# Patient Record
Sex: FEMALE | Race: WHITE | NOT HISPANIC OR LATINO | Employment: FULL TIME | URBAN - METROPOLITAN AREA
[De-identification: names, ages, dates, MRNs, and addresses within clinical notes are randomized per-mention and may not be internally consistent; named-entity substitution may affect disease eponyms.]

---

## 2019-04-08 ENCOUNTER — OUTPATIENT INFUSION SERVICES (OUTPATIENT)
Dept: ONCOLOGY | Facility: MEDICAL CENTER | Age: 34
End: 2019-04-08
Attending: INTERNAL MEDICINE
Payer: COMMERCIAL

## 2019-04-08 VITALS
SYSTOLIC BLOOD PRESSURE: 132 MMHG | HEIGHT: 64 IN | HEART RATE: 66 BPM | WEIGHT: 171.08 LBS | OXYGEN SATURATION: 98 % | TEMPERATURE: 97.1 F | DIASTOLIC BLOOD PRESSURE: 71 MMHG | BODY MASS INDEX: 29.21 KG/M2 | RESPIRATION RATE: 18 BRPM

## 2019-04-08 DIAGNOSIS — K50.10 CROHN'S COLITIS, WITHOUT COMPLICATIONS (HCC): ICD-10-CM

## 2019-04-08 LAB
HBV CORE AB SERPL QL IA: NEGATIVE
HBV SURFACE AB SERPL IA-ACNC: 81.59 MIU/ML (ref 0–10)

## 2019-04-08 PROCEDURE — 86480 TB TEST CELL IMMUN MEASURE: CPT

## 2019-04-08 PROCEDURE — 96415 CHEMO IV INFUSION ADDL HR: CPT

## 2019-04-08 PROCEDURE — 96413 CHEMO IV INFUSION 1 HR: CPT

## 2019-04-08 PROCEDURE — 700111 HCHG RX REV CODE 636 W/ 250 OVERRIDE (IP): Performed by: INTERNAL MEDICINE

## 2019-04-08 PROCEDURE — A9270 NON-COVERED ITEM OR SERVICE: HCPCS | Performed by: INTERNAL MEDICINE

## 2019-04-08 PROCEDURE — 86706 HEP B SURFACE ANTIBODY: CPT

## 2019-04-08 PROCEDURE — 86704 HEP B CORE ANTIBODY TOTAL: CPT

## 2019-04-08 PROCEDURE — 700102 HCHG RX REV CODE 250 W/ 637 OVERRIDE(OP): Performed by: INTERNAL MEDICINE

## 2019-04-08 PROCEDURE — 96375 TX/PRO/DX INJ NEW DRUG ADDON: CPT

## 2019-04-08 PROCEDURE — 700105 HCHG RX REV CODE 258: Performed by: INTERNAL MEDICINE

## 2019-04-08 RX ORDER — M-VIT,TX,IRON,MINS/CALC/FOLIC 27MG-0.4MG
1 TABLET ORAL DAILY
COMMUNITY

## 2019-04-08 RX ORDER — ACETAMINOPHEN 500 MG
1000 TABLET ORAL ONCE
Status: COMPLETED | OUTPATIENT
Start: 2019-04-08 | End: 2019-04-08

## 2019-04-08 RX ORDER — DIPHENHYDRAMINE HCL 25 MG
25 TABLET ORAL ONCE
Status: COMPLETED | OUTPATIENT
Start: 2019-04-08 | End: 2019-04-08

## 2019-04-08 RX ORDER — METHYLPREDNISOLONE SODIUM SUCCINATE 40 MG/ML
40 INJECTION, POWDER, LYOPHILIZED, FOR SOLUTION INTRAMUSCULAR; INTRAVENOUS
Status: COMPLETED | OUTPATIENT
Start: 2019-04-08 | End: 2019-04-08

## 2019-04-08 RX ADMIN — INFLIXIMAB 400 MG: 100 INJECTION, POWDER, LYOPHILIZED, FOR SOLUTION INTRAVENOUS at 17:35

## 2019-04-08 RX ADMIN — DIPHENHYDRAMINE HCL 25 MG: 25 TABLET ORAL at 17:18

## 2019-04-08 RX ADMIN — METHYLPREDNISOLONE SODIUM SUCCINATE 40 MG: 40 INJECTION, POWDER, FOR SOLUTION INTRAMUSCULAR; INTRAVENOUS at 17:23

## 2019-04-08 RX ADMIN — ACETAMINOPHEN 1000 MG: 500 TABLET, FILM COATED ORAL at 17:18

## 2019-04-09 NOTE — PROGRESS NOTES
Pt scheduled for Remicade; transferring care from [facility] to [facility]. Arrived ambulatory, independent; endorsed minor abdominal pain; denied s/sx infection or other health changes. Pt educated on infusion process, procedures; oriented to unit; questions answered until satisfactory. Pt presented treatment summary from [facility], which stated HBV & TB quant labs completed; however, new samples drawn per pharmacy protocol. PIV placed to R-FA; easily flushed, brisk blood return. Premeds given per orders (including solu-medrol per pt request). Infusion completed over 2 hr per pt request w/o adverse events. PIV flushed, brisk blood return; removed, cath intact. Treatment plan reviewed; message sent to schedulers re: appt needs. Pt denied any unmet needs; discharged ambulatory, independent, NAD.

## 2019-04-10 LAB
GAMMA INTERFERON BACKGROUND BLD IA-ACNC: 0.04 IU/ML
M TB IFN-G BLD-IMP: NEGATIVE
M TB IFN-G CD4+ BCKGRND COR BLD-ACNC: 0.06 IU/ML
MITOGEN IGNF BCKGRD COR BLD-ACNC: >10 IU/ML
QFT TB2 - NIL TBQ2: 0.08 IU/ML

## 2019-05-31 ENCOUNTER — HOSPITAL ENCOUNTER (OUTPATIENT)
Dept: LAB | Facility: MEDICAL CENTER | Age: 34
End: 2019-05-31
Attending: INTERNAL MEDICINE
Payer: COMMERCIAL

## 2019-05-31 LAB
ALBUMIN SERPL BCP-MCNC: 4.3 G/DL (ref 3.2–4.9)
ALBUMIN/GLOB SERPL: 1.4 G/DL
ALP SERPL-CCNC: 51 U/L (ref 30–99)
ALT SERPL-CCNC: 23 U/L (ref 2–50)
ANION GAP SERPL CALC-SCNC: 7 MMOL/L (ref 0–11.9)
AST SERPL-CCNC: 24 U/L (ref 12–45)
BASOPHILS # BLD AUTO: 0.9 % (ref 0–1.8)
BASOPHILS # BLD: 0.08 K/UL (ref 0–0.12)
BILIRUB SERPL-MCNC: 0.7 MG/DL (ref 0.1–1.5)
BUN SERPL-MCNC: 15 MG/DL (ref 8–22)
CALCIUM SERPL-MCNC: 9.4 MG/DL (ref 8.5–10.5)
CHLORIDE SERPL-SCNC: 106 MMOL/L (ref 96–112)
CO2 SERPL-SCNC: 25 MMOL/L (ref 20–33)
CREAT SERPL-MCNC: 0.66 MG/DL (ref 0.5–1.4)
EOSINOPHIL # BLD AUTO: 0.73 K/UL (ref 0–0.51)
EOSINOPHIL NFR BLD: 8.5 % (ref 0–6.9)
ERYTHROCYTE [DISTWIDTH] IN BLOOD BY AUTOMATED COUNT: 41.2 FL (ref 35.9–50)
GLOBULIN SER CALC-MCNC: 3 G/DL (ref 1.9–3.5)
GLUCOSE SERPL-MCNC: 81 MG/DL (ref 65–99)
HCT VFR BLD AUTO: 44.8 % (ref 37–47)
HGB BLD-MCNC: 15 G/DL (ref 12–16)
IMM GRANULOCYTES # BLD AUTO: 0.02 K/UL (ref 0–0.11)
IMM GRANULOCYTES NFR BLD AUTO: 0.2 % (ref 0–0.9)
LYMPHOCYTES # BLD AUTO: 2.22 K/UL (ref 1–4.8)
LYMPHOCYTES NFR BLD: 25.7 % (ref 22–41)
MCH RBC QN AUTO: 30.9 PG (ref 27–33)
MCHC RBC AUTO-ENTMCNC: 33.5 G/DL (ref 33.6–35)
MCV RBC AUTO: 92.4 FL (ref 81.4–97.8)
MONOCYTES # BLD AUTO: 0.54 K/UL (ref 0–0.85)
MONOCYTES NFR BLD AUTO: 6.3 % (ref 0–13.4)
NEUTROPHILS # BLD AUTO: 5.04 K/UL (ref 2–7.15)
NEUTROPHILS NFR BLD: 58.4 % (ref 44–72)
NRBC # BLD AUTO: 0 K/UL
NRBC BLD-RTO: 0 /100 WBC
PLATELET # BLD AUTO: 290 K/UL (ref 164–446)
PMV BLD AUTO: 10.2 FL (ref 9–12.9)
POTASSIUM SERPL-SCNC: 4.2 MMOL/L (ref 3.6–5.5)
PROT SERPL-MCNC: 7.3 G/DL (ref 6–8.2)
RBC # BLD AUTO: 4.85 M/UL (ref 4.2–5.4)
SODIUM SERPL-SCNC: 138 MMOL/L (ref 135–145)
WBC # BLD AUTO: 8.6 K/UL (ref 4.8–10.8)

## 2019-05-31 PROCEDURE — 80053 COMPREHEN METABOLIC PANEL: CPT

## 2019-05-31 PROCEDURE — 85025 COMPLETE CBC W/AUTO DIFF WBC: CPT

## 2019-05-31 PROCEDURE — 36415 COLL VENOUS BLD VENIPUNCTURE: CPT

## 2019-06-03 ENCOUNTER — APPOINTMENT (OUTPATIENT)
Dept: RADIOLOGY | Facility: MEDICAL CENTER | Age: 34
End: 2019-06-03
Attending: INTERNAL MEDICINE
Payer: COMMERCIAL

## 2019-06-06 ENCOUNTER — HOSPITAL ENCOUNTER (OUTPATIENT)
Dept: RADIOLOGY | Facility: MEDICAL CENTER | Age: 34
End: 2019-06-06
Attending: INTERNAL MEDICINE
Payer: COMMERCIAL

## 2019-06-06 DIAGNOSIS — K50.019 CROHN'S DISEASE OF SMALL INTESTINE WITH COMPLICATION (HCC): ICD-10-CM

## 2019-06-06 PROCEDURE — 77080 DXA BONE DENSITY AXIAL: CPT

## 2019-06-07 ENCOUNTER — OUTPATIENT INFUSION SERVICES (OUTPATIENT)
Dept: ONCOLOGY | Facility: MEDICAL CENTER | Age: 34
End: 2019-06-07
Attending: INTERNAL MEDICINE
Payer: COMMERCIAL

## 2019-06-07 VITALS
BODY MASS INDEX: 29.45 KG/M2 | HEIGHT: 63 IN | OXYGEN SATURATION: 94 % | HEART RATE: 82 BPM | TEMPERATURE: 98.2 F | RESPIRATION RATE: 18 BRPM | SYSTOLIC BLOOD PRESSURE: 111 MMHG | WEIGHT: 166.23 LBS | DIASTOLIC BLOOD PRESSURE: 73 MMHG

## 2019-06-07 PROCEDURE — A9270 NON-COVERED ITEM OR SERVICE: HCPCS | Performed by: INTERNAL MEDICINE

## 2019-06-07 PROCEDURE — 96413 CHEMO IV INFUSION 1 HR: CPT

## 2019-06-07 PROCEDURE — 700102 HCHG RX REV CODE 250 W/ 637 OVERRIDE(OP): Performed by: INTERNAL MEDICINE

## 2019-06-07 PROCEDURE — 96375 TX/PRO/DX INJ NEW DRUG ADDON: CPT

## 2019-06-07 PROCEDURE — 700111 HCHG RX REV CODE 636 W/ 250 OVERRIDE (IP): Performed by: INTERNAL MEDICINE

## 2019-06-07 PROCEDURE — 96415 CHEMO IV INFUSION ADDL HR: CPT

## 2019-06-07 PROCEDURE — 700105 HCHG RX REV CODE 258: Performed by: INTERNAL MEDICINE

## 2019-06-07 RX ORDER — DIPHENHYDRAMINE HCL 25 MG
25 TABLET ORAL ONCE
Status: COMPLETED | OUTPATIENT
Start: 2019-06-07 | End: 2019-06-07

## 2019-06-07 RX ORDER — INFLIXIMAB 100 MG/10ML
INJECTION, POWDER, LYOPHILIZED, FOR SOLUTION INTRAVENOUS
COMMUNITY
End: 2020-11-06

## 2019-06-07 RX ORDER — ACETAMINOPHEN 500 MG
1000 TABLET ORAL ONCE
Status: COMPLETED | OUTPATIENT
Start: 2019-06-07 | End: 2019-06-07

## 2019-06-07 RX ORDER — METHYLPREDNISOLONE SODIUM SUCCINATE 40 MG/ML
40 INJECTION, POWDER, LYOPHILIZED, FOR SOLUTION INTRAMUSCULAR; INTRAVENOUS
Status: COMPLETED | OUTPATIENT
Start: 2019-06-07 | End: 2019-06-07

## 2019-06-07 RX ADMIN — DIPHENHYDRAMINE HCL 25 MG: 25 TABLET ORAL at 17:08

## 2019-06-07 RX ADMIN — ACETAMINOPHEN 1000 MG: 500 TABLET, FILM COATED ORAL at 17:08

## 2019-06-07 RX ADMIN — METHYLPREDNISOLONE SODIUM SUCCINATE 40 MG: 40 INJECTION, POWDER, FOR SOLUTION INTRAMUSCULAR; INTRAVENOUS at 17:15

## 2019-06-07 RX ADMIN — INFLIXIMAB 400 MG: 100 INJECTION, POWDER, LYOPHILIZED, FOR SOLUTION INTRAVENOUS at 17:20

## 2019-06-08 NOTE — PROGRESS NOTES
Pt arrives to [facility] for Remicade infusion.  Pt denies s/sx of infection.  PIV established to R hand.  Pre-medications given as ordered.  Pt requests Solumedrol IVP due to mild itchiness.  Remicade infused through in line filter.  PIV flushed and removed.  Site wrapped with gauze and coban.  Sent email to scheduling dept to contact patient to schedule next infusion.  Pt dc home to self care.

## 2019-06-17 ENCOUNTER — OFFICE VISIT (OUTPATIENT)
Dept: MEDICAL GROUP | Facility: IMAGING CENTER | Age: 34
End: 2019-06-17
Payer: COMMERCIAL

## 2019-06-17 VITALS
WEIGHT: 165 LBS | SYSTOLIC BLOOD PRESSURE: 110 MMHG | TEMPERATURE: 98.4 F | BODY MASS INDEX: 28.17 KG/M2 | HEIGHT: 64 IN | DIASTOLIC BLOOD PRESSURE: 72 MMHG | RESPIRATION RATE: 12 BRPM | OXYGEN SATURATION: 97 % | HEART RATE: 61 BPM

## 2019-06-17 DIAGNOSIS — K50.919 CROHN'S DISEASE WITH COMPLICATION, UNSPECIFIED GASTROINTESTINAL TRACT LOCATION (HCC): ICD-10-CM

## 2019-06-17 DIAGNOSIS — Z12.83 SKIN CANCER SCREENING: ICD-10-CM

## 2019-06-17 DIAGNOSIS — M25.551 CHRONIC HIP PAIN, BILATERAL: ICD-10-CM

## 2019-06-17 DIAGNOSIS — E55.9 VITAMIN D DEFICIENCY: ICD-10-CM

## 2019-06-17 DIAGNOSIS — G89.29 CHRONIC HIP PAIN, BILATERAL: ICD-10-CM

## 2019-06-17 DIAGNOSIS — Z12.4 CERVICAL CANCER SCREENING: ICD-10-CM

## 2019-06-17 DIAGNOSIS — Z00.00 HEALTHCARE MAINTENANCE: ICD-10-CM

## 2019-06-17 DIAGNOSIS — M25.552 CHRONIC HIP PAIN, BILATERAL: ICD-10-CM

## 2019-06-17 PROBLEM — G43.909 MIGRAINE: Status: ACTIVE | Noted: 2019-06-17

## 2019-06-17 PROBLEM — K50.90 CROHN'S DISEASE (HCC): Status: ACTIVE | Noted: 2019-06-17

## 2019-06-17 PROCEDURE — 99205 OFFICE O/P NEW HI 60 MIN: CPT | Performed by: INTERNAL MEDICINE

## 2019-06-17 ASSESSMENT — PAIN SCALES - GENERAL: PAINLEVEL: NO PAIN

## 2019-06-17 NOTE — LETTER
[employer]  Price Blair D.O.  [address] [place] [address]  Fax: [phone]   Authorization for Release/Disclosure of   Protected Health Information   Name: NEELA LYNCH : 1985 SSN: [id number]   Address: [facility] [address] [place] [address] Phone:    [phone] (home)    I authorize the entity listed below to release/disclose the PHI below to:   [employer]/Price Blair D.O. and Price Blair D.O.   Provider or Entity Name:     Address   City, State, Zip   Phone:      Fax:     Reason for request: continuity of care   Information to be released:    [  ] LAST COLONOSCOPY,  including any PATH REPORT and follow-up  [  ] LAST FIT/COLOGUARD RESULT [  ] LAST DEXA  [  ] LAST MAMMOGRAM  [  ] LAST PAP  [  ] LAST LABS [  ] RETINA EXAM REPORT  [  ] IMMUNIZATION RECORDS  [  ] Release all info      [  ] Check here and initial the line next to each item to release ALL health information INCLUDING  _____ Care and treatment for drug and / or alcohol abuse  _____ HIV testing, infection status, or AIDS  _____ Genetic Testing    DATES OF SERVICE OR TIME PERIOD TO BE DISCLOSED: _____________  I understand and acknowledge that:  * This Authorization may be revoked at any time by you in writing, except if your health information has already been used or disclosed.  * Your health information that will be used or disclosed as a result of you signing this authorization could be re-disclosed by the recipient. If this occurs, your re-disclosed health information may no longer be protected by State or Federal laws.  * You may refuse to sign this Authorization. Your refusal will not affect your ability to obtain treatment.  * This Authorization becomes effective upon signing and will  on (date) __________.      If no date is indicated, this Authorization will  one (1) year from the signature date.    Name: Neela Lynch    Signature:   Date:     2019       PLEASE FAX REQUESTED  RECORDS BACK TO: [phone]

## 2019-06-17 NOTE — PROGRESS NOTES
Chief Complaint   Patient presents with   • Crohn's Disease     since 2011     Magaly Lynch is a 33 y.o. female who presents today to establish care at [facility] and to discuss joint pain and Crohn's disease    HPI:  Patient describes joint pain associated with Crohn's disease. Joint pain is worse at hips. Some sciatica described as pinching sensation.  Also hand joints - problematic when needs to work on a computer. Thumb joints. Provocation/palliation: Worse during colder months. Unable to sleep well. Better now that it is warmer.   Onset: Started about 3 yrs ago.   Severity: Pain all the time.  Stops her doing what she does 6/10. Standing desk on occasion helps.  Treatments: Restorative yoga class can sometimes improve pain. Questioning about pain management for joint pain - she prefers not to take opioids. IV opiates work ok.  Don't like the way they make her feel. Vasovagal reaction with IV.   Works for [employer]. Had acupuncture there x 1 - skeptical. Also saw chiropractor (sciatic pain) around the same time. THC and CBD salve helps. 600 ibupfrofen pr (uses once a week).      Migraines ocular. Since 13.   Triggers: Stress and certain lighting, post stress. Soy or fake meat products.   Treatment: Also had ovarian cyst and had been on OCP - stayed on for migraines. Off OCP bc of side effects.     Allergist - envt allergies. Skin allergies - rashes. Pollen. Rhinorhea. Contact derm with some grasses. Poison oak.   Treatment: Avoidance.     Crohn's disease. Chronic condition - Remicade since 2011. Term illeum mainly involved. Symptoms are overall managed on this medication. Sees Dr. Euceda.  2018 Colonoscopy normal.  Was previously on MTX (had many infections - had MRSA sinus)  Flu shot annually  DEXA normal 6/2019  Would like gynecology and gynecology recommendation    Diet - Tried anti-inflammatory diet, AIP, whole 30, vegetarian (tried 18 yrs).  Can't eat certain foods (avoids bell pepper). Feels her diet is  now balanced - less red meat and more lean meats. Lost 8lbs since moving to [employer].  Exercise: yoga, boxing, spinning, hiking.    Current medicines (including changes today)  Current Outpatient Prescriptions   Medication Sig Dispense Refill   • inFLIXimab (REMICADE) 100 MG Recon Soln by Intravenous route.     • therapeutic multivitamin-minerals (THERAGRAN-M) Tab Take 1 Tab by mouth every day.     • Probiotic Product (PROBIOTIC-10 PO) Take  by mouth.       No current facility-administered medications for this visit.      She  has a past medical history of Crohn's disease (HCC) (20111); Hip pain, bilateral; Kidney stone (2008 to 2015); Migraines (1998); and Ovarian cyst.  She  has a past surgical history that includes dental extraction(s).  Social History   Substance Use Topics   • Smoking status: Never Smoker   • Smokeless tobacco: Never Used   • Alcohol use Yes      Comment: once a week     Family History   Problem Relation Age of Onset   • Breast Cancer Mother 57        no genetic markers   • Other Mother         MS, fibroids/hysterectomy   • Cancer Maternal Grandmother         uterine or fibrioids?, breast, skin, lung   • Lung Disease Maternal Grandmother         emphysema   • Psychiatry Maternal Grandmother         suicide   • GI Maternal Grandfather         Crohn's   • GI Maternal Aunt         Crohn's     No family status information on file.     Social History     Social History Narrative   • No narrative on file     ROS  Constitutional: Negative for fever, chills, weight loss and some malaise/fatigue.   HENT: Negative for ear pain, nosebleeds, congestion, sore throat and neck pain.    Eyes: Negative for blurred vision.   Respiratory: Negative for cough, sputum production, shortness of breath and wheezing.    Cardiovascular: Negative for chest pain, palpitations, orthopnea and leg swelling.   Gastrointestinal: Negative for heartburn, nausea, vomiting and abdominal pain.   Genitourinary: Negative for dysuria,  "urgency and frequency.   Musculoskeletal: Negative for myalgias, positive for joint pain as per hpi.  Skin: Negative for rash and itching.   Neurological: Negative for dizziness, tingling, tremors, sensory change, focal weakness and headaches.   Endo/Heme/Allergies: Does not bruise/bleed easily.   Psychiatric/Behavioral: Negative for memory loss.  Some anxiety/depressed symptoms. Lack of sleep - has 3 yr old.  All other systems reviewed and are negative except as in HPI.    Labs reviewed with patient:  Lab Results   Component Value Date/Time    WBC 8.6 05/31/2019 11:10 AM    RBC 4.85 05/31/2019 11:10 AM    HEMOGLOBIN 15.0 05/31/2019 11:10 AM    HEMATOCRIT 44.8 05/31/2019 11:10 AM    MCV 92.4 05/31/2019 11:10 AM    MCH 30.9 05/31/2019 11:10 AM    MCHC 33.5 (L) 05/31/2019 11:10 AM    MPV 10.2 05/31/2019 11:10 AM    NEUTSPOLYS 58.40 05/31/2019 11:10 AM    LYMPHOCYTES 25.70 05/31/2019 11:10 AM    MONOCYTES 6.30 05/31/2019 11:10 AM    EOSINOPHILS 8.50 (H) 05/31/2019 11:10 AM    BASOPHILS 0.90 05/31/2019 11:10 AM      Lab Results   Component Value Date/Time    SODIUM 138 05/31/2019 11:10 AM    POTASSIUM 4.2 05/31/2019 11:10 AM    CHLORIDE 106 05/31/2019 11:10 AM    CO2 25 05/31/2019 11:10 AM    GLUCOSE 81 05/31/2019 11:10 AM    BUN 15 05/31/2019 11:10 AM    CREATININE 0.66 05/31/2019 11:10 AM    ALKPHOSPHAT 51 05/31/2019 11:10 AM    ASTSGOT 24 05/31/2019 11:10 AM    ALTSGPT 23 05/31/2019 11:10 AM    TBILIRUBIN 0.7 05/31/2019 11:10 AM        Objective:   /72 (BP Location: Left arm, Patient Position: Sitting, BP Cuff Size: Adult)   Pulse 61   Temp 36.9 °C (98.4 °F) (Temporal)   Resp 12   Ht 1.626 m (5' 4\")   Wt 74.8 kg (165 lb)   SpO2 97%  Body mass index is 28.32 kg/m².  Physical Exam:  Constitutional: Alert, no distress.  Skin: Warm, dry, good turgor, no rashes in visible areas.  Eye: Equal, round and reactive, conjunctiva clear, lids normal.  ENMT: Lips without lesions, good dentition, oropharynx " clear.  Neck: Trachea midline, no masses, no thyromegaly.  Respiratory: Unlabored respiratory effort, lungs clear to auscultation, no wheezes, no ronchi.  Cardiovascular: Regular rate and rhythm, no edema.  Abdomen: Soft, non-tender, no masses, no hepatosplenomegaly.  Psych: Alert and oriented x3, normal affect and mood.    Assessment and Plan:   The following treatment plan was discussed  1. Crohn's disease with complication, unspecified gastrointestinal tract location (HCC)     2. Chronic hip pain, bilateral  DX-LUMBAR SPINE-2 OR 3 VIEWS    DX-HIP-BILATERAL-WITH PELVIS-5+   3. Vitamin D deficiency  VITAMIN D,25 HYDROXY   4. Healthcare maintenance  Lipid Profile   5. Cervical cancer screening  REFERRAL TO GYNECOLOGY   6. Skin cancer screening  REFERRAL TO DERMATOLOGY     1. Stable, mostly controlled per patient. Follow-up with ASHLEY Low as scheduled. Records requested. Continue probiotics.   2. Check initial plain films. Trial of acupuncture discussed.   3. Recheck vitamin D3. Has been low in the past.  4. Will need annual TB testing as she is on anti-TNF treatment. Colorectal cancer screening surveillance as set by GI (due in 2023). Immunizations recommended: annual flu, pneumococcal (repeat PPSV23 5 yrs after first PPSV23 dose - may be due for this), HBV (immune 4/2019), neg TB 4/2019.   5. Prior hx of high risk HPV in 6/2015. ASCUS on 4/2018 PAP.  6.  Has increased risk of melanoma.  Records requested.  Followup: Return in about 3 months (around 9/17/2019) for follow-up with PCP, Lab review.    Spent 60 minutes in face-to-tace patient contact in which greater than 50% of the visit was spent in counseling and coordination of care including discussing the benefits and utility of acupuncture.  I answered patient questions about efficacy, safety, and what to expect during a treatment, and the likely number of treatments needed. We also reviewed PMH, family history, and each of her chronic diagnoses in regards to  current management, specialty physicians, symptom control, and future planning.  Please refer to assessment and plan/discussion/recommendations for additional details.        I have worked with consultants from the vendor as well as technical experts from [employer] to optimize the interface. I have made every reasonable attempt to correct obvious errors, but I expect that there are errors of grammar and possibly content that I did not discover before finalizing the note.

## 2019-06-20 ENCOUNTER — APPOINTMENT (OUTPATIENT)
Dept: MEDICAL GROUP | Facility: IMAGING CENTER | Age: 34
End: 2019-06-20

## 2019-06-26 ENCOUNTER — APPOINTMENT (OUTPATIENT)
Dept: MEDICAL GROUP | Facility: IMAGING CENTER | Age: 34
End: 2019-06-26

## 2019-06-28 ENCOUNTER — APPOINTMENT (OUTPATIENT)
Dept: MEDICAL GROUP | Facility: IMAGING CENTER | Age: 34
End: 2019-06-28

## 2019-07-01 ENCOUNTER — TELEPHONE (OUTPATIENT)
Dept: MEDICAL GROUP | Facility: IMAGING CENTER | Age: 34
End: 2019-07-01

## 2019-07-01 NOTE — TELEPHONE ENCOUNTER
----- Message from Magaly Lynch sent at 7/1/2019  5:00 AM PDT -----  Regarding: orders from Dr. Johnnie Mckenzie,     Dr. Blair wanted me to notify you that she added a couple of lab tests for you to have done. A lipid panel and Vitamin D. You will need to fast for these labs. They are in the [facility] system, so if you go to a [facility] lab, you do not need to bring any orders with you. Here is a link to lab locations:    [email or web address]    She also ordered a couple of xrays for you (back and hip). Here is a link to the [facility] imaging locations with the phone number for you to schedule your tests:    [email or web address]    Please let us know if you have any questions!  Jacqueline

## 2019-07-03 ENCOUNTER — APPOINTMENT (OUTPATIENT)
Dept: MEDICAL GROUP | Facility: IMAGING CENTER | Age: 34
End: 2019-07-03

## 2019-07-05 ENCOUNTER — APPOINTMENT (OUTPATIENT)
Dept: MEDICAL GROUP | Facility: IMAGING CENTER | Age: 34
End: 2019-07-05

## 2019-07-12 ENCOUNTER — APPOINTMENT (OUTPATIENT)
Dept: MEDICAL GROUP | Facility: IMAGING CENTER | Age: 34
End: 2019-07-12

## 2019-07-15 ENCOUNTER — APPOINTMENT (OUTPATIENT)
Dept: MEDICAL GROUP | Facility: IMAGING CENTER | Age: 34
End: 2019-07-15

## 2019-07-31 ENCOUNTER — OUTPATIENT INFUSION SERVICES (OUTPATIENT)
Dept: ONCOLOGY | Facility: MEDICAL CENTER | Age: 34
End: 2019-07-31
Attending: INTERNAL MEDICINE
Payer: COMMERCIAL

## 2019-07-31 VITALS
BODY MASS INDEX: 28.45 KG/M2 | DIASTOLIC BLOOD PRESSURE: 77 MMHG | WEIGHT: 166.67 LBS | HEART RATE: 72 BPM | HEIGHT: 64 IN | RESPIRATION RATE: 18 BRPM | OXYGEN SATURATION: 95 % | TEMPERATURE: 98 F | SYSTOLIC BLOOD PRESSURE: 127 MMHG

## 2019-07-31 PROCEDURE — 700105 HCHG RX REV CODE 258: Performed by: INTERNAL MEDICINE

## 2019-07-31 PROCEDURE — 96375 TX/PRO/DX INJ NEW DRUG ADDON: CPT

## 2019-07-31 PROCEDURE — 700111 HCHG RX REV CODE 636 W/ 250 OVERRIDE (IP): Performed by: INTERNAL MEDICINE

## 2019-07-31 PROCEDURE — A9270 NON-COVERED ITEM OR SERVICE: HCPCS | Performed by: INTERNAL MEDICINE

## 2019-07-31 PROCEDURE — 96413 CHEMO IV INFUSION 1 HR: CPT

## 2019-07-31 PROCEDURE — 96415 CHEMO IV INFUSION ADDL HR: CPT

## 2019-07-31 PROCEDURE — 700102 HCHG RX REV CODE 250 W/ 637 OVERRIDE(OP): Performed by: INTERNAL MEDICINE

## 2019-07-31 RX ORDER — DIPHENHYDRAMINE HCL 25 MG
25 TABLET ORAL ONCE
Status: COMPLETED | OUTPATIENT
Start: 2019-07-31 | End: 2019-07-31

## 2019-07-31 RX ORDER — ACETAMINOPHEN 500 MG
1000 TABLET ORAL ONCE
Status: COMPLETED | OUTPATIENT
Start: 2019-07-31 | End: 2019-07-31

## 2019-07-31 RX ORDER — METHYLPREDNISOLONE SODIUM SUCCINATE 40 MG/ML
40 INJECTION, POWDER, LYOPHILIZED, FOR SOLUTION INTRAMUSCULAR; INTRAVENOUS
Status: DISCONTINUED | OUTPATIENT
Start: 2019-07-31 | End: 2019-07-31 | Stop reason: HOSPADM

## 2019-07-31 RX ADMIN — METHYLPREDNISOLONE SODIUM SUCCINATE 40 MG: 40 INJECTION, POWDER, FOR SOLUTION INTRAMUSCULAR; INTRAVENOUS at 15:23

## 2019-07-31 RX ADMIN — INFLIXIMAB 400 MG: 100 INJECTION, POWDER, LYOPHILIZED, FOR SOLUTION INTRAVENOUS at 15:47

## 2019-07-31 RX ADMIN — ACETAMINOPHEN 1000 MG: 500 TABLET, FILM COATED ORAL at 15:23

## 2019-07-31 RX ADMIN — DIPHENHYDRAMINE HCL 25 MG: 25 TABLET ORAL at 15:23

## 2019-07-31 NOTE — PROGRESS NOTES
Patient presents for Remicade infusion. Patient denies any active infections at this time. PIV established, flushes well with brisk blood return. Pre-medications administered as ordered. Remicade infusing, in-line filter in place. Patient in stable condition, report to Rachelle ENGLAND.

## 2019-08-01 NOTE — PROGRESS NOTES
Report received from TOMÁS Walker.  Remicade infusion completed without incident.  Line flushed clear with normal saline.  PIV flushed with normal saline; continued + blood return verified.  PIV d/c'd.  Pressure dressing applied. Pt released to self care in no apparent distress after completion of treatment, ambulatory.  Pt to return in 8 weeks; next appointment scheduled.

## 2019-09-27 ENCOUNTER — OUTPATIENT INFUSION SERVICES (OUTPATIENT)
Dept: ONCOLOGY | Facility: MEDICAL CENTER | Age: 34
End: 2019-09-27
Attending: INTERNAL MEDICINE
Payer: COMMERCIAL

## 2019-09-27 VITALS
DIASTOLIC BLOOD PRESSURE: 69 MMHG | HEART RATE: 72 BPM | HEIGHT: 64 IN | TEMPERATURE: 98.5 F | SYSTOLIC BLOOD PRESSURE: 120 MMHG | WEIGHT: 172.62 LBS | OXYGEN SATURATION: 95 % | RESPIRATION RATE: 18 BRPM | BODY MASS INDEX: 29.47 KG/M2

## 2019-09-27 PROCEDURE — 96375 TX/PRO/DX INJ NEW DRUG ADDON: CPT

## 2019-09-27 PROCEDURE — 700102 HCHG RX REV CODE 250 W/ 637 OVERRIDE(OP): Performed by: INTERNAL MEDICINE

## 2019-09-27 PROCEDURE — 96413 CHEMO IV INFUSION 1 HR: CPT

## 2019-09-27 PROCEDURE — 700105 HCHG RX REV CODE 258: Performed by: INTERNAL MEDICINE

## 2019-09-27 PROCEDURE — A9270 NON-COVERED ITEM OR SERVICE: HCPCS | Performed by: INTERNAL MEDICINE

## 2019-09-27 PROCEDURE — 700111 HCHG RX REV CODE 636 W/ 250 OVERRIDE (IP): Performed by: INTERNAL MEDICINE

## 2019-09-27 PROCEDURE — 96415 CHEMO IV INFUSION ADDL HR: CPT

## 2019-09-27 RX ORDER — ACETAMINOPHEN 500 MG
1000 TABLET ORAL ONCE
Status: COMPLETED | OUTPATIENT
Start: 2019-09-27 | End: 2019-09-27

## 2019-09-27 RX ORDER — DIPHENHYDRAMINE HCL 25 MG
25 TABLET ORAL ONCE
Status: COMPLETED | OUTPATIENT
Start: 2019-09-27 | End: 2019-09-27

## 2019-09-27 RX ORDER — METHYLPREDNISOLONE SODIUM SUCCINATE 40 MG/ML
40 INJECTION, POWDER, LYOPHILIZED, FOR SOLUTION INTRAMUSCULAR; INTRAVENOUS
Status: DISCONTINUED | OUTPATIENT
Start: 2019-09-27 | End: 2019-09-27 | Stop reason: HOSPADM

## 2019-09-27 RX ADMIN — METHYLPREDNISOLONE SODIUM SUCCINATE 40 MG: 40 INJECTION, POWDER, FOR SOLUTION INTRAMUSCULAR; INTRAVENOUS at 16:08

## 2019-09-27 RX ADMIN — INFLIXIMAB 400 MG: 100 INJECTION, POWDER, LYOPHILIZED, FOR SOLUTION INTRAVENOUS at 16:16

## 2019-09-27 RX ADMIN — ACETAMINOPHEN 1000 MG: 500 TABLET, FILM COATED ORAL at 16:00

## 2019-09-27 RX ADMIN — DIPHENHYDRAMINE HCL 25 MG: 25 TABLET ORAL at 16:01

## 2019-09-28 NOTE — PROGRESS NOTES
Report received from TOMÁS Degroot. Remicade infusing at time of report.  Pt denied complaints, call bell within reach. Pt tolerated remainder of infusion. PIV flushed with NS, then d/c'd. Catheter tip remained intact. Gauze and coban to site.  Pt stated she will check MyChart to see her next scheduled appointment time. Left OPIC in NAD.

## 2019-09-28 NOTE — PROGRESS NOTES
Pt ambulatory to [facility] for Remicade infusion.  Pt denies any sx of infection. Pt w/ no complaints at this time.  PIV established in right hand, brisk blood return noted, flushed per protocol.  Pre-meds administered w/ no adverse reactions.  Remicade infused through PIV w/ filter in place over 2 hours.  1700  Report given to Moriah ENGLAND who will finish caring for the pt.

## 2019-09-30 ENCOUNTER — OFFICE VISIT (OUTPATIENT)
Dept: MEDICAL GROUP | Facility: IMAGING CENTER | Age: 34
End: 2019-09-30
Payer: COMMERCIAL

## 2019-09-30 VITALS
RESPIRATION RATE: 12 BRPM | HEART RATE: 65 BPM | SYSTOLIC BLOOD PRESSURE: 110 MMHG | HEIGHT: 63 IN | DIASTOLIC BLOOD PRESSURE: 84 MMHG | TEMPERATURE: 98.3 F | BODY MASS INDEX: 30.76 KG/M2 | WEIGHT: 173.6 LBS | OXYGEN SATURATION: 99 %

## 2019-09-30 DIAGNOSIS — K50.919 CROHN'S DISEASE WITH COMPLICATION, UNSPECIFIED GASTROINTESTINAL TRACT LOCATION (HCC): ICD-10-CM

## 2019-09-30 DIAGNOSIS — G43.109 MIGRAINE WITH AURA AND WITHOUT STATUS MIGRAINOSUS, NOT INTRACTABLE: ICD-10-CM

## 2019-09-30 DIAGNOSIS — Z23 NEED FOR INFLUENZA VACCINATION: ICD-10-CM

## 2019-09-30 DIAGNOSIS — Z12.83 SKIN CANCER SCREENING: ICD-10-CM

## 2019-09-30 PROCEDURE — 90686 IIV4 VACC NO PRSV 0.5 ML IM: CPT | Performed by: FAMILY MEDICINE

## 2019-09-30 PROCEDURE — 90471 IMMUNIZATION ADMIN: CPT | Performed by: FAMILY MEDICINE

## 2019-09-30 PROCEDURE — 99213 OFFICE O/P EST LOW 20 MIN: CPT | Mod: 25 | Performed by: FAMILY MEDICINE

## 2019-09-30 RX ORDER — CHLORAL HYDRATE 500 MG
1000 CAPSULE ORAL DAILY
COMMUNITY

## 2019-09-30 ASSESSMENT — PAIN SCALES - GENERAL: PAINLEVEL: NO PAIN

## 2019-09-30 ASSESSMENT — PATIENT HEALTH QUESTIONNAIRE - PHQ9: CLINICAL INTERPRETATION OF PHQ2 SCORE: 0

## 2019-09-30 NOTE — PROGRESS NOTES
Subjective:     CC:    Chief Complaint   Patient presents with   • Establish Care       HISTORY OF THE PRESENT ILLNESS: This pleasant patient is a 33 y.o. female here to establish care. Her prior PCP was Dr Blair.  Had some pain in the hips there with some sciatica pains worse when sitting. Worse in winter. Started three years ago. Occasional nausea. abd pain once per week. Did not get the xrays or labs. The hip pain is currently not bothering her.   Here to get reestablished. Wants flu vaccine. remicade infusion three days ago.  11 years with chron's. It is well controlled. About once per week she gets some abd pain that resolves on its own. She is always tender in the right lower quadrant she was told this is due to scarring. It has been stable for 5 years.   She gets migraines less than once per month. Though she does get aura with them.   She was on bcp from teen years due to ovarian cyst though has been off for some time now with out pelvic pain. There is concern for a fibroid she has a TVUS scheduled and will f/u with gyn on this.   She works for [employer]. She is ashraf. And not worried about pregnancy.     Allergies: Hydrocortisone sod succinate; Sumatriptan; Infliximab; Cedarwood oil; Grass extracts [gramineae pollens]; Lidocaine; Poison oak extract [extract of poison oak]; Pollen extract; Prochlorperazine; Promethazine; Zolmitriptan; and Azathioprine    Current Outpatient Medications Ordered in Epic   Medication Sig Dispense Refill   • Omega-3 Fatty Acids (FISH OIL) 1000 MG Cap capsule Take 1,000 mg by mouth every day.     • inFLIXimab (REMICADE) 100 MG Recon Soln by Intravenous route.     • therapeutic multivitamin-minerals (THERAGRAN-M) Tab Take 1 Tab by mouth every day.     • Probiotic Product (PROBIOTIC-10 PO) Take  by mouth.       No current Epic-ordered facility-administered medications on file.        Past Medical History:   Diagnosis Date   • Crohn's disease (HCC) 20111   • Hip pain, bilateral    •  "Kidney stone 2008 to 2015    4x (mostly on right side).    • Migraines 1998   • MRSA infection     sinus infection   • Ovarian cyst     age 18, burst       Past Surgical History:   Procedure Laterality Date   • DENTAL EXTRACTION(S)         Social History     Tobacco Use   • Smoking status: Never Smoker   • Smokeless tobacco: Never Used   Substance Use Topics   • Alcohol use: Yes     Comment: once a week   • Drug use: Yes     Types: Marijuana     Comment: past hallucinogens, smoking (vape)       Social History     Social History Narrative   • Not on file       Family History   Problem Relation Age of Onset   • Breast Cancer Mother 57        no genetic markers   • Other Mother         MS, fibroids/hysterectomy   • Cancer Maternal Grandmother         uterine or fibrioids?, breast, skin, lung   • Lung Disease Maternal Grandmother         emphysema   • Psychiatric Illness Maternal Grandmother         suicide   • GI Disease Maternal Grandfather         Crohn's   • GI Disease Maternal Aunt         Crohn's       ROS:   Gen: no fevers/chills, no changes in weight  Eyes: no changes in vision  ENT: no sore throat, no hearing loss, no bloody nose  Pulm: no sob, no cough  CV: no chest pain, no palpitations  GI: no nausea/vomiting, no diarrhea, she does get constipated   : no dysuria  MSk: no myalgias  Skin: no rash  Neuro: no headaches, no numbness/tingling  Heme/Lymph: no easy bruising      Objective:     Exam: /84 (BP Location: Left arm, Patient Position: Sitting, BP Cuff Size: Adult)   Pulse 65   Temp 36.8 °C (98.3 °F) (Temporal)   Resp 12   Ht 1.6 m (5' 3\")   Wt 78.7 kg (173 lb 9.6 oz)   SpO2 99%  Body mass index is 30.75 kg/m².    General: Normal appearing. No distress.  HEENT: Normocephalic. Eyes conjunctiva clear lids without ptosis, pupils equal and reactive to light    Neck: Supple without JVD or bruit. Thyroid is not enlarged.  Pulmonary: Clear to ausculation.  Normal effort. No rales, ronchi, or " wheezing.  Cardiovascular: Regular rate and rhythm without murmur. Carotid and radial pulses are intact and equal bilaterally.  Abdomen: bs present and normal. No distension of the abd. Slight tenderness rlq does not appear to be associated with the ovaries or uterus though there is some referred pain to the right middle quadrant.   Neurologic: Grossly nonfocal  Lymph: No cervical or supraclavicular lymph nodes are palpable  Skin: Warm and dry.  No obvious lesions.  Musculoskeletal: Normal gait. No extremity cyanosis, clubbing, or edema.  Psych: Normal mood and affect. Alert and oriented x3. Judgment and insight is normal.      Assessment & Plan:   33 y.o. female with the following -    There are no diagnoses linked to this encounter.  She did get blood work and xray of the hips  chrons disease well controlled at this time  Problem List Items Addressed This Visit     Crohn's disease (HCC)    Migraine      Other Visit Diagnoses     Skin cancer screening        Relevant Orders    REFERRAL TO DERMATOLOGY    Need for influenza vaccination        Relevant Orders    Influenza Vaccine Quad Injection (PF) (Completed)        Problem   Crohn's Disease (Hcc)    Overview:   - dx'd in Jan 2011, mainly involving terminal ileum  - initially treated w/ entocort and azathioprine  - on remicade since 4/2011  - HAV / HBV immune  - negatve Tb quant  - currently on remicade [id number] and MTX  - followed by Dr. Cross  - colo - 1/2011 - erythema, edema and cobblestoning in distal ileum and prominent lymphoid aggregates on biopsy    Med Hx: Entocort, aza (itching/sleepiness), Remicade 4/11, MTX 7.5    Last Assessment & Plan:   Doing really well on remicade with increased risk of skin cancer. Sent to derm  Discussed increased risk of pe when hospitalized and with birth control   Vit D insufficiency - Vit D3 plus calcium daily  Healthcare Maintenance:  dexa normal  Flu and pneumovax vaccines complete  Annual Pap smear. Just had pap smear  aug2019  Follow-up in office again in 6 months.     Migraine    Overview:   Hx side effects with sumatriptan and zolmitriptan    Last Assessment & Plan:   - new to me, stable, pt only w/ occ migraines  - pt not interested in prophylactic medication at this time   -mentioned aimovig will need to confirm ok with remicade if she wants to try   - avoid estrogen containing birth control  -she takes motrin though it does not work for her  -occurs less than monthly           Please note that this dictation was created using voice recognition software. I have made every reasonable attempt to correct obvious errors, but I expect that there are errors of grammar and possibly content that I did not discover before finalizing the note.

## 2019-11-22 ENCOUNTER — OUTPATIENT INFUSION SERVICES (OUTPATIENT)
Dept: ONCOLOGY | Facility: MEDICAL CENTER | Age: 34
End: 2019-11-22
Attending: INTERNAL MEDICINE
Payer: COMMERCIAL

## 2019-11-22 VITALS
SYSTOLIC BLOOD PRESSURE: 105 MMHG | WEIGHT: 175.71 LBS | BODY MASS INDEX: 30 KG/M2 | TEMPERATURE: 98.5 F | HEIGHT: 64 IN | HEART RATE: 60 BPM | OXYGEN SATURATION: 99 % | RESPIRATION RATE: 18 BRPM | DIASTOLIC BLOOD PRESSURE: 66 MMHG

## 2019-11-22 PROCEDURE — 700105 HCHG RX REV CODE 258: Performed by: INTERNAL MEDICINE

## 2019-11-22 PROCEDURE — 700111 HCHG RX REV CODE 636 W/ 250 OVERRIDE (IP): Performed by: INTERNAL MEDICINE

## 2019-11-22 PROCEDURE — 96375 TX/PRO/DX INJ NEW DRUG ADDON: CPT

## 2019-11-22 PROCEDURE — 96415 CHEMO IV INFUSION ADDL HR: CPT

## 2019-11-22 PROCEDURE — 96413 CHEMO IV INFUSION 1 HR: CPT

## 2019-11-22 PROCEDURE — 700102 HCHG RX REV CODE 250 W/ 637 OVERRIDE(OP): Performed by: INTERNAL MEDICINE

## 2019-11-22 PROCEDURE — A9270 NON-COVERED ITEM OR SERVICE: HCPCS | Performed by: INTERNAL MEDICINE

## 2019-11-22 RX ORDER — METHYLPREDNISOLONE SODIUM SUCCINATE 40 MG/ML
40 INJECTION, POWDER, LYOPHILIZED, FOR SOLUTION INTRAMUSCULAR; INTRAVENOUS
Status: ACTIVE | OUTPATIENT
Start: 2019-11-22 | End: 2019-09-27

## 2019-11-22 RX ORDER — DIPHENHYDRAMINE HCL 25 MG
25 TABLET ORAL ONCE
Status: COMPLETED | OUTPATIENT
Start: 2019-11-22 | End: 2019-11-22

## 2019-11-22 RX ORDER — ACETAMINOPHEN 500 MG
1000 TABLET ORAL ONCE
Status: COMPLETED | OUTPATIENT
Start: 2019-11-22 | End: 2019-11-22

## 2019-11-22 RX ORDER — METHYLPREDNISOLONE SODIUM SUCCINATE 40 MG/ML
40 INJECTION, POWDER, LYOPHILIZED, FOR SOLUTION INTRAMUSCULAR; INTRAVENOUS
Status: DISCONTINUED | OUTPATIENT
Start: 2019-11-22 | End: 2019-11-22 | Stop reason: HOSPADM

## 2019-11-22 RX ADMIN — DIPHENHYDRAMINE HCL 25 MG: 25 TABLET ORAL at 16:06

## 2019-11-22 RX ADMIN — INFLIXIMAB 400 MG: 100 INJECTION, POWDER, LYOPHILIZED, FOR SOLUTION INTRAVENOUS at 16:15

## 2019-11-22 RX ADMIN — ACETAMINOPHEN 1000 MG: 500 TABLET, FILM COATED ORAL at 16:06

## 2019-11-22 RX ADMIN — METHYLPREDNISOLONE SODIUM SUCCINATE 40 MG: 40 INJECTION, POWDER, FOR SOLUTION INTRAMUSCULAR; INTRAVENOUS at 16:07

## 2019-11-23 NOTE — PROGRESS NOTES
Patient care assumed, verbal report from Moriah ENGLAND. Remicade infusing with no reactions. Continue to monitor and support.

## 2019-11-23 NOTE — PROGRESS NOTES
Tolerated infusion with no reactions. PIV flushed and removed with tip intact. Site covered with gauze and coban. Discharged home to self care in no distress at this time. Next appointment in place.

## 2019-11-23 NOTE — PROGRESS NOTES
Pt arrived ambulatory to [facility] for Remicade infusion.  Pt noted to have Remicade on allergy list. Pt stated that if she doesn't have an allergy to Infliximab, she just had a reaction to it once.  She requested hydrocortisone as a pre-med for Remicade. PIV placed, flushed easily, tiffany briskly.  Pt received pre-meds as ordered.  Remicade set to infuse over 2 hours per patient tolerance. Report given to Teresa BHATIA RN who is assuming care of patient for remainder of visit.

## 2020-01-14 ENCOUNTER — HOSPITAL ENCOUNTER (OUTPATIENT)
Facility: MEDICAL CENTER | Age: 35
End: 2020-01-14
Attending: FAMILY MEDICINE
Payer: COMMERCIAL

## 2020-01-14 ENCOUNTER — OFFICE VISIT (OUTPATIENT)
Dept: MEDICAL GROUP | Facility: IMAGING CENTER | Age: 35
End: 2020-01-14
Payer: COMMERCIAL

## 2020-01-14 VITALS
RESPIRATION RATE: 12 BRPM | OXYGEN SATURATION: 98 % | HEART RATE: 78 BPM | BODY MASS INDEX: 30.83 KG/M2 | WEIGHT: 174 LBS | TEMPERATURE: 98.1 F | SYSTOLIC BLOOD PRESSURE: 116 MMHG | DIASTOLIC BLOOD PRESSURE: 80 MMHG | HEIGHT: 63 IN

## 2020-01-14 DIAGNOSIS — J32.9 RECURRENT SINUS INFECTIONS: ICD-10-CM

## 2020-01-14 DIAGNOSIS — N20.0 NEPHROLITHIASIS: ICD-10-CM

## 2020-01-14 PROCEDURE — 87070 CULTURE OTHR SPECIMN AEROBIC: CPT

## 2020-01-14 PROCEDURE — 87077 CULTURE AEROBIC IDENTIFY: CPT

## 2020-01-14 PROCEDURE — 87186 SC STD MICRODIL/AGAR DIL: CPT

## 2020-01-14 PROCEDURE — 99214 OFFICE O/P EST MOD 30 MIN: CPT | Performed by: FAMILY MEDICINE

## 2020-01-14 RX ORDER — AMOXICILLIN AND CLAVULANATE POTASSIUM 875; 125 MG/1; MG/1
1 TABLET, FILM COATED ORAL 2 TIMES DAILY
Qty: 10 TAB | Refills: 0 | Status: SHIPPED | OUTPATIENT
Start: 2020-01-14 | End: 2020-01-28

## 2020-01-14 ASSESSMENT — PATIENT HEALTH QUESTIONNAIRE - PHQ9: CLINICAL INTERPRETATION OF PHQ2 SCORE: 0

## 2020-01-15 ENCOUNTER — TELEPHONE (OUTPATIENT)
Dept: MEDICAL GROUP | Facility: IMAGING CENTER | Age: 35
End: 2020-01-15

## 2020-01-15 NOTE — TELEPHONE ENCOUNTER
Called and spoke with lab regarding canceled nasal culture. Spoke with Abbie and order was canceled as [facility] thought it was for MRSA. Let them know it is not checking for MRSA. Order will be reordered and ran.

## 2020-01-15 NOTE — PROGRESS NOTES
Subjective:     CC:    Chief Complaint   Patient presents with   • Sinus Problem     reoccuring sinus congestion   • Nephrolithiasis     had stone in december   • Stye     left eyelid       HISTORY OF THE PRESENT ILLNESS: This pleasant 34 y.o. female with Crohn's disease on infliximab discuss sinus infection.     Passed a stone in December 2019. She had three kideny stones over three years in the past. Then started to make sure she was hydrating she went to urology and determined to be calcium oxylate. no symptoms now she passed a stone on her own.     Recurring sinus infection now on her third.  She was retreated with doxycycline and rifampin and topical mupirocin for nasal vestibulitis with nonresistant staph and strep. First started in 2014. Saw derm, ent, and id. No fevers, headaches, or sinus pain. She does have congestion and with crust. Kian pod helps though it just returns.     She still gets nose bleeds even after cauterization. Can not tolerated flonase    Allergies: Hydrocortisone sod succinate; Sumatriptan; Infliximab; Cedarwood oil; Grass extracts [gramineae pollens]; Lidocaine; Poison oak extract [extract of poison oak]; Pollen extract; Prochlorperazine; Promethazine; Zolmitriptan; and Azathioprine    Current Outpatient Medications Ordered in Epic   Medication Sig Dispense Refill   • Norethin-Eth Estrad-Fe Biphas (LO LOESTRIN FE) 1 MG-10 MCG / 10 MCG Tab Take 1 tablet by mouth every day.     • Omega-3 Fatty Acids (FISH OIL) 1000 MG Cap capsule Take 1,000 mg by mouth every day.     • inFLIXimab (REMICADE) 100 MG Recon Soln by Intravenous route.     • therapeutic multivitamin-minerals (THERAGRAN-M) Tab Take 1 Tab by mouth every day.     • Probiotic Product (PROBIOTIC-10 PO) Take  by mouth.       No current Epic-ordered facility-administered medications on file.        Past Medical History:   Diagnosis Date   • Crohn's disease (HCC) 20111   • Hip pain, bilateral    • Kidney stone 2008 to 2015    4x (mostly  "on right side).    • Migraines 1998   • MRSA infection     sinus infection   • Ovarian cyst     age 18, burst       Past Surgical History:   Procedure Laterality Date   • DENTAL EXTRACTION(S)         Social History     Tobacco Use   • Smoking status: Never Smoker   • Smokeless tobacco: Never Used   Substance Use Topics   • Alcohol use: Yes     Comment: once a week   • Drug use: Yes     Types: Marijuana     Comment: past hallucinogens, smoking (vape)       Social History     Patient does not qualify to have social determinant information on file (likely too young).   Social History Narrative   • Not on file       Family History   Problem Relation Age of Onset   • Breast Cancer Mother 57        no genetic markers   • Other Mother         MS, fibroids/hysterectomy   • Cancer Maternal Grandmother         uterine or fibrioids?, breast, skin, lung   • Lung Disease Maternal Grandmother         emphysema   • Psychiatric Illness Maternal Grandmother         suicide   • GI Disease Maternal Grandfather         Crohn's   • GI Disease Maternal Aunt         Crohn's       ROS:   Gen: no fevers/chills, no changes in weight  Eyes: no changes in vision  ENT: no sore throat, no hearing loss  Pulm: no sob, no cough  CV: no chest pain, no palpitations  GI: no nausea/vomiting, no diarrhea  : no dysuria  MSk: no myalgias  Skin: no rash  Neuro: no headaches, no numbness/tingling  Heme/Lymph: no easy bruising      Objective:     Exam: /80   Pulse 78   Temp 36.7 °C (98.1 °F)   Resp 12   Ht 1.6 m (5' 3\")   Wt 78.9 kg (174 lb)   SpO2 98%  Body mass index is 30.82 kg/m².    General: Normal appearing. No distress.  HEENT: Normocephalic. Eyes conjunctiva clear lids without ptosis, eomi. ear canals clear bilaterally tympanic membranes without erythema, bulging, with good light reflex no fluid behind TM bilaterally.  Nasal turbinates boggy with clear discharge and thick mucoid discharge.  Oropharynx clear  Neck: Thyroid is not " enlarged.  Pulmonary: Clear to ausculation.  Normal effort. No rales, ronchi, or wheezing.  Cardiovascular: Regular rate and rhythm without murmur. Carotid and radial pulses are intact and equal bilaterally.  Neurologic: No facial droop, normal gait, alert and oriented  Lymph: No cervical or supraclavicular lymph nodes are palpable  Skin: Warm and dry.  No obvious lesions.  Musculoskeletal: No extremity cyanosis, clubbing, or edema.  Psych: Normal mood and affect. Judgment and insight is normal.      Assessment & Plan:   34 y.o. female with the following -    Problem List Items Addressed This Visit     Nephrolithiasis    Relevant Orders    REFERRAL TO UROLOGY    Recurrent sinus infections    Relevant Medications    amoxicillin-clavulanate (AUGMENTIN) 875-125 MG Tab    Other Relevant Orders    REFERRAL TO ENT        Problem   Nephrolithiasis    Per patient confirmed calcium oxalate stone with urology  Does well when she stays hydrated  Encouraged to come to the office if this occurs again for treatment and to check for infection     Recurrent Sinus Infections    Patient comes in with sinus congestion not responding to Danielle Ace patient cannot tolerate Flonase due to severe nosebleeds.  She brings her records from [facility] where she saw an infectious disease specialist ENT and a dermatology for a recurring sinus infection that was determined to be nasal vestibulitis caused by nonresistant staph aureus and strep.  Patient was given doxycycline and rifampin and topical mupirocin.  Patient is concerned that this is what is going on again today.  She is on immune modulating medications for her Crohn's.  Though after looking in her nose and per her symptoms this could just be a regular sinus infection.  Good to give her a referral to ENT and start Augmentin.  Patient is can return to office if she is not feeling better in about 3 or 4 days or if symptoms worsen. Her nasal culture has been canceled by the lab Kathie Cottrell  will call to find out what happened.   -Patient aware of most common side effects with antibiotics such as arrhythmia, severe sometimes deadly diarrhea, and allergic reaction       Return if symptoms worsen or fail to improve.    Please note that this dictation was created using voice recognition software. I have made every reasonable attempt to correct obvious errors, but I expect that there are errors of grammar and possibly content that I did not discover before finalizing the note.

## 2020-01-17 ENCOUNTER — OUTPATIENT INFUSION SERVICES (OUTPATIENT)
Dept: ONCOLOGY | Facility: MEDICAL CENTER | Age: 35
End: 2020-01-17
Attending: INTERNAL MEDICINE
Payer: COMMERCIAL

## 2020-01-17 VITALS
TEMPERATURE: 98.4 F | BODY MASS INDEX: 29.81 KG/M2 | HEART RATE: 68 BPM | OXYGEN SATURATION: 98 % | SYSTOLIC BLOOD PRESSURE: 123 MMHG | RESPIRATION RATE: 17 BRPM | HEIGHT: 64 IN | WEIGHT: 174.6 LBS | DIASTOLIC BLOOD PRESSURE: 76 MMHG

## 2020-01-17 LAB
BACTERIA SPEC RESP CULT: ABNORMAL
BACTERIA SPEC RESP CULT: ABNORMAL
SIGNIFICANT IND 70042: ABNORMAL
SITE SITE: ABNORMAL
SOURCE SOURCE: ABNORMAL

## 2020-01-17 PROCEDURE — 700111 HCHG RX REV CODE 636 W/ 250 OVERRIDE (IP): Performed by: INTERNAL MEDICINE

## 2020-01-17 PROCEDURE — 700105 HCHG RX REV CODE 258: Performed by: INTERNAL MEDICINE

## 2020-01-17 PROCEDURE — 96415 CHEMO IV INFUSION ADDL HR: CPT

## 2020-01-17 PROCEDURE — 96375 TX/PRO/DX INJ NEW DRUG ADDON: CPT

## 2020-01-17 PROCEDURE — 700102 HCHG RX REV CODE 250 W/ 637 OVERRIDE(OP): Performed by: INTERNAL MEDICINE

## 2020-01-17 PROCEDURE — A9270 NON-COVERED ITEM OR SERVICE: HCPCS | Performed by: INTERNAL MEDICINE

## 2020-01-17 PROCEDURE — 96413 CHEMO IV INFUSION 1 HR: CPT

## 2020-01-17 RX ORDER — DIPHENHYDRAMINE HCL 25 MG
25 TABLET ORAL ONCE
Status: COMPLETED | OUTPATIENT
Start: 2020-01-17 | End: 2020-01-17

## 2020-01-17 RX ORDER — METHYLPREDNISOLONE SODIUM SUCCINATE 40 MG/ML
40 INJECTION, POWDER, LYOPHILIZED, FOR SOLUTION INTRAMUSCULAR; INTRAVENOUS
Status: DISCONTINUED | OUTPATIENT
Start: 2020-01-17 | End: 2020-01-17 | Stop reason: HOSPADM

## 2020-01-17 RX ORDER — ACETAMINOPHEN 500 MG
1000 TABLET ORAL ONCE
Status: COMPLETED | OUTPATIENT
Start: 2020-01-17 | End: 2020-01-17

## 2020-01-17 RX ADMIN — INFLIXIMAB 400 MG: 100 INJECTION, POWDER, LYOPHILIZED, FOR SOLUTION INTRAVENOUS at 15:48

## 2020-01-17 RX ADMIN — METHYLPREDNISOLONE SODIUM SUCCINATE 40 MG: 40 INJECTION, POWDER, FOR SOLUTION INTRAMUSCULAR; INTRAVENOUS at 15:33

## 2020-01-17 RX ADMIN — DIPHENHYDRAMINE HCL 25 MG: 25 TABLET ORAL at 15:31

## 2020-01-17 RX ADMIN — ACETAMINOPHEN 1000 MG: 500 TABLET, FILM COATED ORAL at 15:31

## 2020-01-18 NOTE — PROGRESS NOTES
Report received from Mikayla CHRISTIE RN. Pt received the remainder of Remicade, tolerated it well.  PIV flushed with NS, then d/c'd. Catheter tip remained intact. Gauze and coban to site. Pt left OPIC in NAD.

## 2020-01-18 NOTE — PROGRESS NOTES
Pt ambulatory to [facility] for Remicade infusion.  Pt denies any sx of infection. Pt w/ no complaints at this time.  PIV established in right posterior hand, brisk blood return noted, flushed per protocol.  Remicade infuseing through PIV w/ filter in place over 2 hours.  1700  Report given to Moriah who will finish caring for the pt.  Pt's next appt in place.

## 2020-01-28 ENCOUNTER — OFFICE VISIT (OUTPATIENT)
Dept: MEDICAL GROUP | Facility: IMAGING CENTER | Age: 35
End: 2020-01-28
Payer: COMMERCIAL

## 2020-01-28 VITALS
HEART RATE: 68 BPM | SYSTOLIC BLOOD PRESSURE: 114 MMHG | OXYGEN SATURATION: 98 % | DIASTOLIC BLOOD PRESSURE: 78 MMHG | HEIGHT: 64 IN | WEIGHT: 174 LBS | BODY MASS INDEX: 29.71 KG/M2 | RESPIRATION RATE: 12 BRPM | TEMPERATURE: 98.6 F

## 2020-01-28 DIAGNOSIS — J32.9 RECURRENT SINUSITIS: ICD-10-CM

## 2020-01-28 DIAGNOSIS — G43.119 INTRACTABLE MIGRAINE WITH AURA WITHOUT STATUS MIGRAINOSUS: ICD-10-CM

## 2020-01-28 DIAGNOSIS — D21.9 FIBROID: ICD-10-CM

## 2020-01-28 PROCEDURE — 99214 OFFICE O/P EST MOD 30 MIN: CPT | Performed by: FAMILY MEDICINE

## 2020-01-28 RX ORDER — SULFAMETHOXAZOLE AND TRIMETHOPRIM 800; 160 MG/1; MG/1
1 TABLET ORAL 2 TIMES DAILY
Qty: 20 TAB | Refills: 0 | Status: SHIPPED | OUTPATIENT
Start: 2020-01-28 | End: 2020-03-06

## 2020-01-28 RX ORDER — DOXYCYCLINE HYCLATE 100 MG
100 TABLET ORAL 2 TIMES DAILY
Qty: 20 TAB | Refills: 0 | Status: SHIPPED | OUTPATIENT
Start: 2020-01-28 | End: 2020-03-06

## 2020-01-28 ASSESSMENT — PAIN SCALES - GENERAL: PAINLEVEL: NO PAIN

## 2020-01-28 NOTE — ASSESSMENT & PLAN NOTE
-She will obtain her last ultrasound for comparison  -Discontinue the Loestrin which is not helping her pain  -We will start NuvaRing which theoretically could improve her symptoms given that there is a higher dose administered locally on the uterus  -We will repeat transvaginal ultrasound 3 to 6 months  -We will refer to gynecology if her symptoms persist  -We have discussed her migraine headache with aura at length.  She is aware of her increased risk for stroke.  However since starting other modality treatments such as acupuncture and birth control patient's migraines have greatly reduced.  She was previously on Loestrin given to her by her gynecologist in [place] who she says said there is no increased risk of stroke in patients taking combination birth control with migraines with aura.  I was not able to find this data and up-to-date.  However theoretically if she switches to NuvaRing and gets local treatment for her fibroids and has less systemic estrogen levels this could further reduce her risk.  I have discussed this with the patient and she agrees with this plan.

## 2020-01-28 NOTE — PROGRESS NOTES
Subjective:     CC:    Chief Complaint   Patient presents with   • Gynecologic Exam       HISTORY OF THE PRESENT ILLNESS: This pleasant 34 y.o. female is here to to discuss sinus infection and uterine fibroids.      THIS is not an annual exam    Has 2 fibroid tumors confirmed on ultrasound here in [place] a couple months ago. This time last year in [facility] went in to gyn for annual. Gyn told her that she might have fibroid tumors. Mother had hysterectomy for fibroid tumors. Saw a gyn and did not vibe with him. She has had a pap last year that was normal. She has pelvic pain outside of period. She also has pelvic pain after orgasm. She had an us that showed two tumors in nov 2019. Gyn recommended birth control and insurance does not cover lo loestrin with iron that she was prescribed she also reports that it has not helped.    Have migraines with aura (buzzing light) decreased since starting estro prog therapy, and acupuncture.  Her migraines occur less than monthly.    She has a 5 yo with sperm doner with her female partner does wish for another pregnancy. She does not want to get pregnant.     Gyn that she saw Dr. Tenzin woodall [facility].     She reports that after taking her Augmentin for her sinus infection she felt better for about 3 days and her symptoms returned.  She reports that she is not sleeping at night due to discomfort sinuses.  Her nose is congested and she has green discharge.  She continues with her neb pod though her symptoms persist despite treatment 2 weeks ago.  She has an appointment with ENT in March.  Allergies: Hydrocortisone sod succinate; Sumatriptan; Infliximab; Cedarwood oil; Grass extracts [gramineae pollens]; Lidocaine; Poison oak extract [extract of poison oak]; Pollen extract; Prochlorperazine; Promethazine; Zolmitriptan; and Azathioprine    Current Outpatient Medications Ordered in Epic   Medication Sig Dispense Refill   • sulfamethoxazole-trimethoprim (BACTRIM DS)  800-160 MG tablet Take 1 Tab by mouth 2 times a day. 20 Tab 0   • doxycycline (VIBRAMYCIN) 100 MG Tab Take 1 Tab by mouth 2 times a day. 20 Tab 0   • Omega-3 Fatty Acids (FISH OIL) 1000 MG Cap capsule Take 1,000 mg by mouth every day.     • inFLIXimab (REMICADE) 100 MG Recon Soln by Intravenous route.     • therapeutic multivitamin-minerals (THERAGRAN-M) Tab Take 1 Tab by mouth every day.     • Probiotic Product (PROBIOTIC-10 PO) Take  by mouth.       No current Epic-ordered facility-administered medications on file.        Past Medical History:   Diagnosis Date   • Crohn's disease (HCC) 20111   • Hip pain, bilateral    • Kidney stone 2008 to 2015    4x (mostly on right side).    • Migraines 1998   • MRSA infection     sinus infection   • Ovarian cyst     age 18, burst       Past Surgical History:   Procedure Laterality Date   • DENTAL EXTRACTION(S)         Social History     Tobacco Use   • Smoking status: Never Smoker   • Smokeless tobacco: Never Used   Substance Use Topics   • Alcohol use: Yes     Comment: once a week   • Drug use: Yes     Types: Marijuana     Comment: past hallucinogens, smoking (vape)       Social History     Patient does not qualify to have social determinant information on file (likely too young).   Social History Narrative   • Not on file       Family History   Problem Relation Age of Onset   • Breast Cancer Mother 57        no genetic markers   • Other Mother         MS, fibroids/hysterectomy   • Cancer Maternal Grandmother         uterine or fibrioids?, breast, skin, lung   • Lung Disease Maternal Grandmother         emphysema   • Psychiatric Illness Maternal Grandmother         suicide   • GI Disease Maternal Grandfather         Crohn's   • GI Disease Maternal Aunt         Crohn's       ROS:   Gen: no fevers/chills, no changes in weight  Eyes: no changes in vision  ENT: no sore throat, no hearing loss  Pulm: no sob, no cough  CV: no chest pain, no palpitations  GI: no nausea/vomiting, no  "diarrhea  : no dysuria  MSk: no myalgias  Skin: no rash  Neuro: no headaches, no numbness/tingling  Heme/Lymph: no easy bruising      Objective:     Exam: /78   Pulse 68   Temp 37 °C (98.6 °F)   Resp 12   Ht 1.62 m (5' 3.78\")   Wt 78.9 kg (174 lb)   SpO2 98%  Body mass index is 30.07 kg/m².    General: Normal appearing. No distress.  HEENT: Normocephalic. Eyes conjunctiva clear lids without ptosis, eomi. boggy nasal turbinates with discharge  Neck: Thyroid is not enlarged.  Pulmonary: Clear to ausculation.  Normal effort. No rales, ronchi, or wheezing.  Cardiovascular: Regular rate and rhythm without murmur. Carotid and radial pulses are intact and equal bilaterally.  Neurologic: No facial droop, normal gait, alert and oriented  Lymph: No cervical or supraclavicular lymph nodes are palpable  Skin: Warm and dry.  No obvious lesions.  Musculoskeletal: No extremity cyanosis, clubbing, or edema.  Psych: Normal mood and affect. Judgment and insight is normal.      Assessment & Plan:   34 y.o. female with the following -    Problem List Items Addressed This Visit     Intractable migraine with aura without status migrainosus    Recurrent sinusitis    Relevant Medications    sulfamethoxazole-trimethoprim (BACTRIM DS) 800-160 MG tablet    doxycycline (VIBRAMYCIN) 100 MG Tab    Other Relevant Orders    CBC WITH DIFFERENTIAL    Comp Metabolic Panel    Fibroid     -She will obtain her last ultrasound for comparison  -Discontinue the Loestrin which is not helping her pain  -We will start NuvaRing which theoretically could improve her symptoms given that there is a higher dose administered locally on the uterus  -We will repeat transvaginal ultrasound 3 to 6 months  -We will refer to gynecology if her symptoms persist  -We have discussed her migraine headache with aura at length.  She is aware of her increased risk for stroke.  However since starting other modality treatments such as acupuncture and birth control " patient's migraines have greatly reduced.  She was previously on Loestrin given to her by her gynecologist in [place] who she says said there is no increased risk of stroke in patients taking combination birth control with migraines with aura.  I was not able to find this data and up-to-date.  However theoretically if she switches to NuvaRing and gets local treatment for her fibroids and has less systemic estrogen levels this could further reduce her risk.  I have discussed this with the patient and she agrees with this plan.         Relevant Orders    US-PELVIC TRANSVAGINAL ONLY        Problem   Fibroid   Recurrent Sinusitis    -Patient prefers antibiotic treatment at this time.  We have had an antibiotic stewardship conversation.  Clindamycin is the next step given that she failed Augmentin and has confirmed MRSA on nasal swab.  However she has Crohn's disease and not comfortable prescribing this due to increased risk of C. difficile infection.  We will now try Bactrim and doxycycline for 10 days.  -Patient aware of most common side effects with antibiotics such as arrhythmia, severe sometimes deadly diarrhea, and allergic reaction.  I always recommend taking probiotics with antibiotics.  -Continue to ENT appointment March     Intractable Migraine With Aura Without Status Migrainosus    Overview:   Hx side effects with sumatriptan and zolmitriptan    Last Assessment & Plan:   - new to me, stable, pt only w/ occ migraines  - pt not interested in prophylactic medication at this time   -mentioned aimovig will need to confirm ok with remicade if she wants to try   - has been on e/p birth control for fibroid treatment  -she takes motrin though it does not work for her  -occurs less than monthly       Return if symptoms worsen or fail to improve.    Please note that this dictation was created using voice recognition software. I have made every reasonable attempt to correct obvious errors, but I expect that there are  errors of grammar and possibly content that I did not discover before finalizing the note.

## 2020-01-29 RX ORDER — ETONOGESTREL AND ETHINYL ESTRADIOL VAGINAL RING .015; .12 MG/D; MG/D
RING VAGINAL
Qty: 3 EACH | Refills: 4 | Status: SHIPPED | OUTPATIENT
Start: 2020-01-29 | End: 2020-05-12 | Stop reason: SDUPTHER

## 2020-02-13 ENCOUNTER — HOSPITAL ENCOUNTER (OUTPATIENT)
Dept: RADIOLOGY | Facility: MEDICAL CENTER | Age: 35
End: 2020-02-13
Attending: PHYSICIAN ASSISTANT
Payer: COMMERCIAL

## 2020-02-13 DIAGNOSIS — N20.0 URIC ACID NEPHROLITHIASIS: ICD-10-CM

## 2020-02-13 PROCEDURE — 74018 RADEX ABDOMEN 1 VIEW: CPT

## 2020-02-14 ENCOUNTER — HOSPITAL ENCOUNTER (OUTPATIENT)
Dept: LAB | Facility: MEDICAL CENTER | Age: 35
End: 2020-02-14
Attending: FAMILY MEDICINE
Payer: COMMERCIAL

## 2020-02-14 DIAGNOSIS — J32.9 RECURRENT SINUSITIS: ICD-10-CM

## 2020-02-14 LAB
ALBUMIN SERPL BCP-MCNC: 3.9 G/DL (ref 3.2–4.9)
ALBUMIN/GLOB SERPL: 1.2 G/DL
ALP SERPL-CCNC: 38 U/L (ref 30–99)
ALT SERPL-CCNC: 12 U/L (ref 2–50)
ANION GAP SERPL CALC-SCNC: 8 MMOL/L (ref 0–11.9)
AST SERPL-CCNC: 16 U/L (ref 12–45)
BASOPHILS # BLD AUTO: 1.3 % (ref 0–1.8)
BASOPHILS # BLD: 0.07 K/UL (ref 0–0.12)
BILIRUB SERPL-MCNC: 0.4 MG/DL (ref 0.1–1.5)
BUN SERPL-MCNC: 12 MG/DL (ref 8–22)
CALCIUM SERPL-MCNC: 9.2 MG/DL (ref 8.5–10.5)
CHLORIDE SERPL-SCNC: 109 MMOL/L (ref 96–112)
CO2 SERPL-SCNC: 23 MMOL/L (ref 20–33)
CREAT SERPL-MCNC: 0.88 MG/DL (ref 0.5–1.4)
EOSINOPHIL # BLD AUTO: 0.13 K/UL (ref 0–0.51)
EOSINOPHIL NFR BLD: 2.5 % (ref 0–6.9)
ERYTHROCYTE [DISTWIDTH] IN BLOOD BY AUTOMATED COUNT: 40.7 FL (ref 35.9–50)
GLOBULIN SER CALC-MCNC: 3.2 G/DL (ref 1.9–3.5)
GLUCOSE SERPL-MCNC: 86 MG/DL (ref 65–99)
HCT VFR BLD AUTO: 43.5 % (ref 37–47)
HGB BLD-MCNC: 14.5 G/DL (ref 12–16)
IMM GRANULOCYTES # BLD AUTO: 0.04 K/UL (ref 0–0.11)
IMM GRANULOCYTES NFR BLD AUTO: 0.8 % (ref 0–0.9)
LYMPHOCYTES # BLD AUTO: 2 K/UL (ref 1–4.8)
LYMPHOCYTES NFR BLD: 38.1 % (ref 22–41)
MCH RBC QN AUTO: 31.6 PG (ref 27–33)
MCHC RBC AUTO-ENTMCNC: 33.3 G/DL (ref 33.6–35)
MCV RBC AUTO: 94.8 FL (ref 81.4–97.8)
MONOCYTES # BLD AUTO: 0.29 K/UL (ref 0–0.85)
MONOCYTES NFR BLD AUTO: 5.5 % (ref 0–13.4)
NEUTROPHILS # BLD AUTO: 2.72 K/UL (ref 2–7.15)
NEUTROPHILS NFR BLD: 51.8 % (ref 44–72)
NRBC # BLD AUTO: 0 K/UL
NRBC BLD-RTO: 0 /100 WBC
PLATELET # BLD AUTO: 308 K/UL (ref 164–446)
PMV BLD AUTO: 10.2 FL (ref 9–12.9)
POTASSIUM SERPL-SCNC: 4.7 MMOL/L (ref 3.6–5.5)
PROT SERPL-MCNC: 7.1 G/DL (ref 6–8.2)
RBC # BLD AUTO: 4.59 M/UL (ref 4.2–5.4)
SODIUM SERPL-SCNC: 140 MMOL/L (ref 135–145)
WBC # BLD AUTO: 5.3 K/UL (ref 4.8–10.8)

## 2020-02-14 PROCEDURE — 85025 COMPLETE CBC W/AUTO DIFF WBC: CPT

## 2020-02-14 PROCEDURE — 80053 COMPREHEN METABOLIC PANEL: CPT

## 2020-02-14 PROCEDURE — 36415 COLL VENOUS BLD VENIPUNCTURE: CPT

## 2020-03-06 ENCOUNTER — OFFICE VISIT (OUTPATIENT)
Dept: MEDICAL GROUP | Facility: IMAGING CENTER | Age: 35
End: 2020-03-06
Payer: COMMERCIAL

## 2020-03-06 VITALS
HEIGHT: 64 IN | BODY MASS INDEX: 29.71 KG/M2 | DIASTOLIC BLOOD PRESSURE: 70 MMHG | WEIGHT: 174 LBS | TEMPERATURE: 99.3 F | SYSTOLIC BLOOD PRESSURE: 120 MMHG | RESPIRATION RATE: 12 BRPM | OXYGEN SATURATION: 96 % | HEART RATE: 92 BPM

## 2020-03-06 DIAGNOSIS — M25.561 ACUTE PAIN OF RIGHT KNEE: ICD-10-CM

## 2020-03-06 PROCEDURE — 99213 OFFICE O/P EST LOW 20 MIN: CPT | Performed by: FAMILY MEDICINE

## 2020-03-06 ASSESSMENT — PAIN SCALES - GENERAL: PAINLEVEL: NO PAIN

## 2020-03-06 ASSESSMENT — FIBROSIS 4 INDEX: FIB4 SCORE: 0.48

## 2020-03-06 NOTE — ASSESSMENT & PLAN NOTE
With suspected meniscal tear with locking with Crissy's test  Patient will need physical therapy and if she does not improve in 4 to 6 weeks to get an MRI and consider to Ortho  Rest when you can  Ice 4 times a day for 20 minutes each time  Compression devises such as braces  Elevate when you can

## 2020-03-06 NOTE — PATIENT INSTRUCTIONS
Rest when you can  Ice 4 times a day for 20 minutes each time  Try a brace  Elevate when you can

## 2020-03-06 NOTE — PROGRESS NOTES
Subjective:     CC:    Chief Complaint   Patient presents with   • Knee Pain     landed on right knee during yoga on 2/23; bending past 90 degrees causes burning pain       HISTORY OF THE PRESENT ILLNESS: This pleasant 34 y.o. female is here to discuss knee pain.  Feb 23rd fell from warrior three position onto right knee. Hurts when she bends past 90 degrees. 6/10. Has tried ice and motrin. Has dislocated popliteal before she popped into place.   Allergies: Hydrocortisone sod succinate; Sumatriptan; Infliximab; Cedarwood oil; Grass extracts [gramineae pollens]; Lidocaine; Poison oak extract [extract of poison oak]; Pollen extract; Prochlorperazine; Promethazine; Zolmitriptan; and Azathioprine    Current Outpatient Medications Ordered in Epic   Medication Sig Dispense Refill   • ethinyl estradiol-etonogestrel (NUVARING) 0.12-0.015 MG/24HR vaginal ring 1 ring PV x3wk, off x1wk. 3 Each 4   • Omega-3 Fatty Acids (FISH OIL) 1000 MG Cap capsule Take 1,000 mg by mouth every day.     • inFLIXimab (REMICADE) 100 MG Recon Soln by Intravenous route.     • therapeutic multivitamin-minerals (THERAGRAN-M) Tab Take 1 Tab by mouth every day.     • Probiotic Product (PROBIOTIC-10 PO) Take  by mouth.     • mupirocin (BACTROBAN) 2 % Ointment        No current Epic-ordered facility-administered medications on file.        Past Medical History:   Diagnosis Date   • Crohn's disease (HCC) 20111   • Hip pain, bilateral    • Kidney stone 2008 to 2015    4x (mostly on right side).    • Migraines 1998   • MRSA infection     sinus infection   • Ovarian cyst     age 18, burst       Past Surgical History:   Procedure Laterality Date   • DENTAL EXTRACTION(S)         Social History     Tobacco Use   • Smoking status: Never Smoker   • Smokeless tobacco: Never Used   Substance Use Topics   • Alcohol use: Yes     Comment: once a week   • Drug use: Yes     Types: Marijuana     Comment: past hallucinogens, smoking (vape)       Social History     Social  "History Narrative   • Not on file       Family History   Problem Relation Age of Onset   • Breast Cancer Mother 57        no genetic markers   • Other Mother         MS, fibroids/hysterectomy   • Cancer Maternal Grandmother         uterine or fibrioids?, breast, skin, lung   • Lung Disease Maternal Grandmother         emphysema   • Psychiatric Illness Maternal Grandmother         suicide   • GI Disease Maternal Grandfather         Crohn's   • GI Disease Maternal Aunt         Crohn's       ROS:   Gen: no fevers/chills, no changes in weight  Eyes: no changes in vision  ENT: no sore throat, no hearing loss  Pulm: no sob, no cough  CV: no chest pain, no palpitations  GI: no nausea/vomiting, no diarrhea  : no dysuria  MSk: no myalgias  Skin: no rash  Neuro: no headaches, no numbness/tingling  Heme/Lymph: no easy bruising      Objective:     Exam: /70   Pulse 92   Temp 37.4 °C (99.3 °F)   Resp 12   Ht 1.62 m (5' 3.78\")   Wt 78.9 kg (174 lb)   SpO2 96%  Body mass index is 30.07 kg/m².    General: Normal appearing. No distress.  HEENT: Normocephalic. Eyes conjunctiva clear lids without ptosis, eomi  Neck: Thyroid is not enlarged.  Neurologic: No facial droop, normal gait, alert and oriented  Lymph: No cervical or supraclavicular lymph nodes are palpable  Skin: Warm and dry.  No obvious lesions.  Musculoskeletal: No extremity cyanosis, clubbing, or edema. José's test with locking on the right knee with internal and external ankle rotation.  Bilateral knees without medial lateral anterior or posterior laxity.  Left knee with full range of motion.  Right knee pain with more than 45 degrees flexion.  Patella seems to be in place on both knees.  There is no effusion or erythema bilaterally.  There is just a small abrasion at the top of the patella on the right.  Psych: Normal mood and affect. Judgment and insight is normal.      Assessment & Plan:   34 y.o. female with the following -  Rest when you can  Ice 4 " times a day for 20 minutes each time  Compression devises such as braces  Elevate when you can    Problem List Items Addressed This Visit     Acute pain of right knee     With suspected meniscal tear with locking with Crissy's test  Patient will need physical therapy and if she does not improve in 4 to 6 weeks to get an MRI and consider to Ortho  Rest when you can  Ice 4 times a day for 20 minutes each time  Compression devises such as braces  Elevate when you can           Relevant Orders    REFERRAL TO PHYSICAL THERAPY Reason for Therapy: Eval/Treat/Report        Problem   Acute Pain of Right Knee     No follow-ups on file.    Please note that this dictation was created using voice recognition software. I have made every reasonable attempt to correct obvious errors, but I expect that there are errors of grammar and possibly content that I did not discover before finalizing the note.

## 2020-03-11 ENCOUNTER — PATIENT MESSAGE (OUTPATIENT)
Dept: MEDICAL GROUP | Facility: IMAGING CENTER | Age: 35
End: 2020-03-11

## 2020-03-13 ENCOUNTER — OUTPATIENT INFUSION SERVICES (OUTPATIENT)
Dept: ONCOLOGY | Facility: MEDICAL CENTER | Age: 35
End: 2020-03-13
Attending: INTERNAL MEDICINE
Payer: COMMERCIAL

## 2020-03-13 VITALS
HEART RATE: 74 BPM | DIASTOLIC BLOOD PRESSURE: 85 MMHG | BODY MASS INDEX: 29.96 KG/M2 | SYSTOLIC BLOOD PRESSURE: 116 MMHG | WEIGHT: 175.49 LBS | TEMPERATURE: 98.3 F | OXYGEN SATURATION: 98 % | RESPIRATION RATE: 18 BRPM | HEIGHT: 64 IN

## 2020-03-13 PROCEDURE — 96375 TX/PRO/DX INJ NEW DRUG ADDON: CPT

## 2020-03-13 PROCEDURE — 700111 HCHG RX REV CODE 636 W/ 250 OVERRIDE (IP): Performed by: INTERNAL MEDICINE

## 2020-03-13 PROCEDURE — 96415 CHEMO IV INFUSION ADDL HR: CPT

## 2020-03-13 PROCEDURE — A9270 NON-COVERED ITEM OR SERVICE: HCPCS | Performed by: INTERNAL MEDICINE

## 2020-03-13 PROCEDURE — 700102 HCHG RX REV CODE 250 W/ 637 OVERRIDE(OP): Performed by: INTERNAL MEDICINE

## 2020-03-13 PROCEDURE — 96413 CHEMO IV INFUSION 1 HR: CPT

## 2020-03-13 PROCEDURE — 700105 HCHG RX REV CODE 258: Performed by: INTERNAL MEDICINE

## 2020-03-13 RX ORDER — DIPHENHYDRAMINE HCL 25 MG
25 TABLET ORAL ONCE
Status: COMPLETED | OUTPATIENT
Start: 2020-03-13 | End: 2020-03-13

## 2020-03-13 RX ORDER — ACETAMINOPHEN 500 MG
1000 TABLET ORAL ONCE
Status: COMPLETED | OUTPATIENT
Start: 2020-03-13 | End: 2020-03-13

## 2020-03-13 RX ORDER — METHYLPREDNISOLONE SODIUM SUCCINATE 40 MG/ML
40 INJECTION, POWDER, LYOPHILIZED, FOR SOLUTION INTRAMUSCULAR; INTRAVENOUS
Status: DISCONTINUED | OUTPATIENT
Start: 2020-03-13 | End: 2020-03-13 | Stop reason: HOSPADM

## 2020-03-13 RX ADMIN — DIPHENHYDRAMINE HCL 25 MG: 25 TABLET ORAL at 15:42

## 2020-03-13 RX ADMIN — METHYLPREDNISOLONE SODIUM SUCCINATE 40 MG: 40 INJECTION, POWDER, FOR SOLUTION INTRAMUSCULAR; INTRAVENOUS at 15:55

## 2020-03-13 RX ADMIN — INFLIXIMAB 400 MG: 100 INJECTION, POWDER, LYOPHILIZED, FOR SOLUTION INTRAVENOUS at 16:09

## 2020-03-13 RX ADMIN — ACETAMINOPHEN 1000 MG: 500 TABLET, FILM COATED ORAL at 15:42

## 2020-03-13 ASSESSMENT — FIBROSIS 4 INDEX: FIB4 SCORE: 0.48

## 2020-03-14 NOTE — PROGRESS NOTES
Patient to [facility] for Remicade infusion. PIV inserted into left hand, flushed with + blood return. Premeds given. Remicade infused with no s/s of infusion reaction. PIV flushed with + blood return and removed. Scheduling emailed to set up appointment for 8 weeks. Patient to home in care of self.

## 2020-03-19 ENCOUNTER — PHYSICAL THERAPY (OUTPATIENT)
Dept: PHYSICAL THERAPY | Facility: REHABILITATION | Age: 35
End: 2020-03-19
Attending: FAMILY MEDICINE
Payer: COMMERCIAL

## 2020-03-19 DIAGNOSIS — M25.561 ACUTE PAIN OF RIGHT KNEE: ICD-10-CM

## 2020-03-19 PROCEDURE — 97161 PT EVAL LOW COMPLEX 20 MIN: CPT

## 2020-03-19 PROCEDURE — 97014 ELECTRIC STIMULATION THERAPY: CPT

## 2020-03-19 PROCEDURE — 97110 THERAPEUTIC EXERCISES: CPT

## 2020-03-19 ASSESSMENT — ENCOUNTER SYMPTOMS
PAIN TIMING: IN THE EVENING
EXACERBATED BY: KNEELING
EXACERBATED BY: STAIR CLIMBING
EXACERBATED BY: PRESSURE
EXACERBATED BY: SQUATTING
ALLEVIATING FACTORS: ACTIVITY
QUALITY: BURNING

## 2020-03-19 NOTE — OP THERAPY EVALUATION
Outpatient Physical Therapy  INITIAL EVALUATION    [facility] Physical Therapy [address]  [address]  [place] [address]  Phone:  [phone]  Fax:  [phone]    Date of Evaluation: 03/19/2020    Patient: Magaly Lynch  YOB: 1985  MRN: [id number]     Referring Provider: Jasmin Palencia M.D.  [facility] [place] [address]   Referring Diagnosis Acute pain of right knee [M25.561]     Time Calculation  Start time: 1430  Stop time: 1530 Time Calculation (min): 60 minutes       Physical Therapy Occurrence Codes    Date of onset of impairment:  2/23/20   Date physical therapy care plan established or reviewed:  3/19/20   Date physical therapy treatment started:  3/19/20          Chief Complaint: No chief complaint on file.    Visit Diagnoses     ICD-10-CM   1. Acute pain of right knee M25.561         Subjective:   History of Present Illness:     Date of onset:  2/23/2020    Mechanism of injury:  Patient reports she fell doing yoga on Feb 23 while doing warrior pose and fell onto right knee with all of her weight.  She reports her knee is still clicking.    SHe reports she has a past medical history of Crohn's disease and reports she is immunocompromised.   She reports ascending and descending stairs is painful and is having decreased ROM.      Patient reports she was told not to exercise per her MD.   Sleep disturbance:  Interrupted sleep  Pain:     Quality:  Burning (denies tingling and numbness)    Pain timing:  In the evening    Relieving factors:  Activity    Aggravating factors:  Stair climbing, squatting, pressure and kneeling (sitting cross legged)    Progression:  Worsening  Social Support:     Lives in:  Multiple-level home    Lives with:  Significant other and young children  Treatments:     None    Activities of Daily Living:     Patient reported ADL status: Patient runs ticketing for [employer]  Patient used to be a pharmaceutical    Has a 4 year  old, does yoga and pilates   Patient Goals:     Patient goals for therapy:  Decreased pain      Past Medical History:   Diagnosis Date   • Crohn's disease (HCC) 20111   • Hip pain, bilateral    • Kidney stone 2008 to 2015    4x (mostly on right side).    • Migraines 1998   • MRSA infection     sinus infection   • Ovarian cyst     age 18, burst     Past Surgical History:   Procedure Laterality Date   • DENTAL EXTRACTION(S)       Social History     Tobacco Use   • Smoking status: Never Smoker   • Smokeless tobacco: Never Used   Substance Use Topics   • Alcohol use: Yes     Comment: once a week     Family and Occupational History     Socioeconomic History   • Marital status: Single     Spouse name: Not on file   • Number of children: Not on file   • Years of education: Not on file   • Highest education level: Not on file   Occupational History   • Occupation: pharmceutical      Comment: Prior job       Objective     Observations     Additional Observation Details  Patient reports clicking and catching in knee     Neurological Testing     Sensation     Knee     Right Knee   Hypersensation: light touch     Active Range of Motion     Right Knee   Flexion: 108 degrees   Extension: 4 degrees     Passive Range of Motion     Right Knee   Flexion: 110 degrees with pain    Patellar Mobility     Right Knee Patellar tendons within functional limits include the medial, lateral, superior and inferior.     Strength:      Right Knee   Flexion: 4  Prone flexion: 4  Extension: 4-  Quadriceps contraction: fair    Tests     Right Knee   Positive Thessaly's test at 20 degrees.   Negative anterior drawer, Thessaly's test at 5 degrees, valgus stress test at 0 degrees, valgus stress test at 30 degrees, varus stress test at 0 degrees and varus stress test at 30 degrees.         Therapeutic Exercises (CPT 52698):     1. Desensitization for knee , HEP     2. Quad set    3. SLR    4. Ball supine rolls    5. Bridge on and off swiss ball      6. Sidelying hip abduction       Therapeutic Exercise Summary: Education regarding using heat and continued exercise for pain free activities as importance to promote healing.     Therapeutic Treatments and Modalities:     1. E Stim Unattended (CPT 29037), Indian to R VMO 5/5 x 1 5min with heat     Time-based treatments/modalities:  Therapeutic exercise minutes (CPT 00919): 13 minutes       Assessment, Response and Plan:   Impairments: abnormal muscle firing, abnormal or restricted ROM, impaired physical strength, lacks appropriate home exercise program and pain with function    Assessment details:  Patient is a 34 year old female with a PMH significant for Crohn's disease who reports c/o R knee pain after falling during a yoga class. Patient demonstrates symptoms consistent with a possible meniscus tear and would benefit from skilled PT with a focus on the above deficits to decrease pain and to improve patients quality of life.   Barriers to therapy:  None  Prognosis: good    Goals:   Short Term Goals:   1. Patient will report performing HEP daily.   Short term goal time span:  2-4 weeks      Long Term Goals:    1. Patient will score <70% impairment on the WOMAC.   2. Patient will report being able to ambulate up and down 10 stairs without being limited by pain.   3. Patient will report being able to return to participating in yoga with only minor modifications.   4. Patient will be independent in upgraded HEP with written instructions.   Long term goal time span:  6-8 weeks    Plan:   Therapy options:  Physical therapy treatment to continue  Planned therapy interventions:  Neuromuscular Re-education (CPT 65280), Manual Therapy (CPT 96508), Infrared Therapy (CPT 37125), E Stim Unattended (CPT 16653), Therapeutic Exercise (CPT 77084) and Therapeutic Activities (CPT 52127)  Other planned therapy interventions:  Dry NEedling  Frequency:  1x week  Duration in weeks:  8  Discussed with:  Patient      Functional  Assessment Used  WOMAC Grand Total: 33.33     Referring provider co-signature:  I have reviewed this plan of care and my co-signature certifies the need for services.  Certification Dates:   From 03/19/20   To 05/14/20    Physician Signature: ________________________________ Date: ______________

## 2020-03-23 ENCOUNTER — APPOINTMENT (OUTPATIENT)
Dept: PHYSICAL THERAPY | Facility: REHABILITATION | Age: 35
End: 2020-03-23
Attending: FAMILY MEDICINE
Payer: COMMERCIAL

## 2020-03-25 ENCOUNTER — PHYSICAL THERAPY (OUTPATIENT)
Dept: PHYSICAL THERAPY | Facility: REHABILITATION | Age: 35
End: 2020-03-25
Attending: FAMILY MEDICINE
Payer: COMMERCIAL

## 2020-03-25 DIAGNOSIS — M25.561 ACUTE PAIN OF RIGHT KNEE: ICD-10-CM

## 2020-03-25 PROCEDURE — 97014 ELECTRIC STIMULATION THERAPY: CPT

## 2020-03-25 PROCEDURE — 97110 THERAPEUTIC EXERCISES: CPT

## 2020-03-25 NOTE — OP THERAPY DAILY TREATMENT
Outpatient Physical Therapy  DAILY TREATMENT     [facility] Physical [address]  [address]  [place] [address]  Phone:  [phone]  Fax:  [phone]    Date: 03/25/2020    Patient: Magaly Lynch  YOB: 1985  MRN: [id number]     Time Calculation  Start time: 1500  Stop time: 1545 Time Calculation (min): 45 minutes       Chief Complaint: No chief complaint on file.    Visit #: 2    SUBJECTIVE:  I am having more catching, but I am not having more pain.     OBJECTIVE:  0-121    Therapeutic Exercises (CPT 90727):     1. Supine ball rolls , 20 x     2. Ball roll with butt lift , 20x     3. Sidelying clams , 20x    4. Ball wall squats in top 1/3 ROM with hip abd band , 30x    5. Standing hip abductin and extension , 20x    6. Sidelying hip abduction , 20x    7. Standing heel raises , 20x      Therapeutic Exercise Summary: Education regarding using heat and continued exercise for pain free activities as importance to promote healing.     Therapeutic Treatments and Modalities:     1. E Stim Unattended (CPT 74573), Malagasy to R VMO 5/5 x 1 5min with heat     Time-based treatments/modalities:  Therapeutic exercise minutes (CPT 26334): 27 minutes           ASSESSMENT:   Response to treatment: Patient demonstrates improved ROM and strength which is resulting in increase function.     PLAN/RECOMMENDATIONS:   Plan for treatment: therapy treatment to continue next visit.  Planned interventions for next visit: continue with current treatment.

## 2020-03-31 ENCOUNTER — PHYSICAL THERAPY (OUTPATIENT)
Dept: PHYSICAL THERAPY | Facility: REHABILITATION | Age: 35
End: 2020-03-31
Attending: FAMILY MEDICINE
Payer: COMMERCIAL

## 2020-03-31 DIAGNOSIS — M25.561 ACUTE PAIN OF RIGHT KNEE: ICD-10-CM

## 2020-03-31 PROCEDURE — 97110 THERAPEUTIC EXERCISES: CPT

## 2020-03-31 PROCEDURE — 97014 ELECTRIC STIMULATION THERAPY: CPT

## 2020-03-31 PROCEDURE — 97140 MANUAL THERAPY 1/> REGIONS: CPT

## 2020-03-31 NOTE — OP THERAPY DAILY TREATMENT
Outpatient Physical Therapy  DAILY TREATMENT     [facility] Physical Therapy [address]  [address]  [place] [address]  Phone:  [phone]  Fax:  [phone]    Date: 03/31/2020    Patient: Magaly Lynch  YOB: 1985  MRN: [id number]     Time Calculation  Start time: 1430  Stop time: 1515 Time Calculation (min): 45 minutes       Chief Complaint: No chief complaint on file.    Visit #: 3    SUBJECTIVE:  I made a poor choice and kicking the ball soccer and I did some lateral movement and that made it hurt.  It is now catching all of the time.     OBJECTIVE:      Therapeutic Exercises (CPT 82412):     1. Supine ball rolls , 20 x     2. Ball roll with butt lift , 20x     3. Sidelying clams , 20x    4. Ball wall squats in top 1/3 ROM with hip abd band , 30x    5. Standing hip abductin and extension , 20x    6. Sidelying hip abduction , 20x    7. Standing heel raises , 20x    8. Shuttle 5c 2 legs , 3 x 10    9. Gastroc raise , 20x      Therapeutic Exercise Summary: Education regarding using heat and continued exercise for pain free activities as importance to promote healing.     Therapeutic Treatments and Modalities:     1. E Stim Unattended (CPT 76540), Omani to R VMO 5/5 x 1 5min with heat     2. Manual Therapy (CPT 31654), STM to quad, medial joint line    Time-based treatments/modalities:         ASSESSMENT:   Response to treatment:  Patient is reporting an increase in left knee pain due to playing soccer with her son.      PLAN/RECOMMENDATIONS:   Plan for treatment: therapy treatment to continue next visit.  Planned interventions for next visit: continue with current treatment.

## 2020-04-07 ENCOUNTER — APPOINTMENT (OUTPATIENT)
Dept: PHYSICAL THERAPY | Facility: REHABILITATION | Age: 35
End: 2020-04-07
Attending: FAMILY MEDICINE
Payer: COMMERCIAL

## 2020-04-09 ENCOUNTER — PHYSICAL THERAPY (OUTPATIENT)
Dept: PHYSICAL THERAPY | Facility: REHABILITATION | Age: 35
End: 2020-04-09
Attending: FAMILY MEDICINE
Payer: COMMERCIAL

## 2020-04-09 DIAGNOSIS — M25.561 ACUTE PAIN OF RIGHT KNEE: ICD-10-CM

## 2020-04-09 PROCEDURE — 97014 ELECTRIC STIMULATION THERAPY: CPT

## 2020-04-09 PROCEDURE — 97140 MANUAL THERAPY 1/> REGIONS: CPT

## 2020-04-09 PROCEDURE — 97110 THERAPEUTIC EXERCISES: CPT

## 2020-04-09 NOTE — OP THERAPY DAILY TREATMENT
Outpatient Physical Therapy  DAILY TREATMENT     [facility] Physical [address]  [address]  [place] [address]  Phone:  [phone]  Fax:  [phone]    Date: 04/09/2020    Patient: Magaly Lynch  YOB: 1985  MRN: [id number]     Time Calculation  Start time: 1400  Stop time: 1445 Time Calculation (min): 45 minutes       Chief Complaint: No chief complaint on file.    Visit #: 4    SUBJECTIVE:  I have been doing yoga since I saw you last. I am doing much better. It is hurting less and clicking less.     OBJECTIVE:    Therapeutic Exercises (CPT 26097):     1. Supine ball rolls , 20 x     2. Ball roll with butt lift , 20x     3. Swiss ball bridge with knees bent, 20x    4. Ball wall squats in top 1/3 ROM with hip abd band , 30x    5. Standing hip abductin and extension , 20x    6. Sidelying hip abduction , 20x    7. Standing heel raises , 20x    8. Shuttle with dynadisc, 4 c single leg , 3 x 10    9. Gastroc raise , 20x    10. Stationary bike x 5 minutes    Therapeutic Treatments and Modalities:     1. E Stim Unattended (CPT 70981), Scottish to R VMO 5/5 x 1 5min with heat     2. Manual Therapy (CPT 47313), STM to quad, medial joint line    Time-based treatments/modalities:  Manual therapy minutes (CPT 21931): 10 minutes  Therapeutic exercise minutes (CPT 69286): 20 minutes         ASSESSMENT:   Response to treatment: Patient is reporting improved strength and ROM.     PLAN/RECOMMENDATIONS:   Plan for treatment: therapy treatment to continue next visit.  Planned interventions for next visit: continue with current treatment.

## 2020-04-13 ENCOUNTER — APPOINTMENT (OUTPATIENT)
Dept: PHYSICAL THERAPY | Facility: REHABILITATION | Age: 35
End: 2020-04-13
Attending: FAMILY MEDICINE
Payer: COMMERCIAL

## 2020-04-15 ENCOUNTER — PHYSICAL THERAPY (OUTPATIENT)
Dept: PHYSICAL THERAPY | Facility: REHABILITATION | Age: 35
End: 2020-04-15
Attending: FAMILY MEDICINE
Payer: COMMERCIAL

## 2020-04-15 DIAGNOSIS — M25.561 ACUTE PAIN OF RIGHT KNEE: ICD-10-CM

## 2020-04-15 PROCEDURE — 97014 ELECTRIC STIMULATION THERAPY: CPT

## 2020-04-15 PROCEDURE — 97110 THERAPEUTIC EXERCISES: CPT

## 2020-04-15 NOTE — OP THERAPY DAILY TREATMENT
Outpatient Physical Therapy  DAILY TREATMENT     [facility] Physical [address]  [address]  [place] [address]  Phone:  [phone]  Fax:  [phone]    Date: 04/15/2020    Patient: Magaly Lynch  YOB: 1985  MRN: [id number]     Time Calculation  Start time: 0930  Stop time: 1020 Time Calculation (min): 50 minutes       Chief Complaint: No chief complaint on file.    Visit #: 5    SUBJECTIVE:  I picked up a bin and knocked my knee on it and it hurt.    OBJECTIVE:    Therapeutic Exercises (CPT 65857):     1. Supine ball rolls , 20 x     2. Ball roll with butt lift , 20x     3. Swiss ball bridge with knees bent, 20x    4. Bent knee bridge on swiss ball , 20x    5. Ball wall squats , 20x    6. Sidelying hip abduction , 20x    7. Standing heel raises , 20x    8. Standing on BOSU , 2 min    9. Step ups on BOSU, 20x/ forwards and sideways     10. Stationary bike x 5 minutes    Therapeutic Treatments and Modalities:     1. E Stim Unattended (CPT 27223), Cambodian to R VMO 5/5 x 1 5min with heat     2. Manual Therapy (CPT 93354), STM to quad, medial joint line    Time-based treatments/modalities:  Therapeutic exercise minutes (CPT 50970): 30 minutes    ASSESSMENT:   Response to treatment: Patient is demonstrating improved strength and ROM    PLAN/RECOMMENDATIONS:   Plan for treatment: therapy treatment to continue next visit.  Planned interventions for next visit: continue with current treatment.

## 2020-04-20 ENCOUNTER — APPOINTMENT (OUTPATIENT)
Dept: PHYSICAL THERAPY | Facility: REHABILITATION | Age: 35
End: 2020-04-20
Attending: FAMILY MEDICINE
Payer: COMMERCIAL

## 2020-04-22 ENCOUNTER — PHYSICAL THERAPY (OUTPATIENT)
Dept: PHYSICAL THERAPY | Facility: REHABILITATION | Age: 35
End: 2020-04-22
Attending: FAMILY MEDICINE
Payer: COMMERCIAL

## 2020-04-22 DIAGNOSIS — M25.561 ACUTE PAIN OF RIGHT KNEE: ICD-10-CM

## 2020-04-22 PROCEDURE — 97014 ELECTRIC STIMULATION THERAPY: CPT

## 2020-04-22 PROCEDURE — 97110 THERAPEUTIC EXERCISES: CPT

## 2020-04-22 NOTE — OP THERAPY DAILY TREATMENT
"  Outpatient Physical Therapy  DAILY TREATMENT     [facility] Physical [address].  [address]  [place] [address]  Phone:  [phone]  Fax:  [phone]    Date: 04/22/2020    Patient: Magaly Lynch  YOB: 1985  MRN: [id number]     Time Calculation  Start time: 1125  Stop time: 1220 Time Calculation (min): 55 minutes       Chief Complaint: No chief complaint on file.    Visit #: 6    SUBJECTIVE:  Im all right. I started walking again and my knee has been a little sore. It is not \"pointy\"  Painful, just normal sore. I don't feel hussein it is going to buckle.      OBJECTIVE:    Therapeutic Exercises (CPT 15971):     1. Supine ball rolls , 20 x     2. Single leg shuttle 4 c 3 x 10, 20x     3. Gastroc raise b legs on shuttle , 3 directions     4. Bent knee bridge on swiss ball , 20x    5. Ball wall squats , 20x    6. Sidelying hip abduction , 20x    7. Standing heel raises , 20x    8. Squats on flat side of BOSU , 12x     9. Step ups on BOSU, 20x/ forwards and sideways     10. Ellipticlal x 5min    Therapeutic Treatments and Modalities:     1. E Stim Unattended (CPT 56777), British to R VMO 5/5 x 1 5min with heat     2. Manual Therapy (CPT 63657), STM to quad, medial joint line, 5 min    Time-based treatments/modalities:  Manual therapy minutes (CPT 36633): 5 minutes  Therapeutic exercise minutes (CPT 36792): 22 minutes       ASSESSMENT:   Response to treatment: Patient reports she is doinb better overall    PLAN/RECOMMENDATIONS:   Plan for treatment: therapy treatment to continue next visit.  Planned interventions for next visit: continue with current treatment.       "

## 2020-04-29 ENCOUNTER — PHYSICAL THERAPY (OUTPATIENT)
Dept: PHYSICAL THERAPY | Facility: REHABILITATION | Age: 35
End: 2020-04-29
Attending: FAMILY MEDICINE
Payer: COMMERCIAL

## 2020-04-29 DIAGNOSIS — M25.561 ACUTE PAIN OF RIGHT KNEE: ICD-10-CM

## 2020-04-29 PROCEDURE — 97014 ELECTRIC STIMULATION THERAPY: CPT

## 2020-04-29 PROCEDURE — 97110 THERAPEUTIC EXERCISES: CPT

## 2020-04-29 NOTE — OP THERAPY DAILY TREATMENT
"  Outpatient Physical Therapy  DAILY TREATMENT     [facility] Physical [address].  [address]  [place] [address]  Phone:  [phone]  Fax:  [phone]    Date: 04/29/2020    Patient: Magaly Lynch  YOB: 1985  MRN: [id number]     Time Calculation  Start time: 1200  Stop time: 1245 Time Calculation (min): 45 minutes       Chief Complaint: No chief complaint on file.    Visit #: 7    SUBJECTIVE:      OBJECTIVE:  Current objective measures:           Therapeutic Exercises (CPT 24686):     1. Ball wall squats , 20 x     2. Step ups on BOSU , 20x     3. Step ups on 4\" block , 20x     4. Lunges forward- alternating leg , 20x    5. Ball wall squats , 20x    6. Sidelying hip abduction , 20x    7. Standing heel raises , 20x    8. Standing wall hip abduction , 30 sec x 2     9. Step ups on BOSU, 20x/ forwards and sideways     10. Ellipticlal x 5min      Therapeutic Exercise Summary: Education regarding knee positions during gait    Therapeutic Treatments and Modalities:     1. E Stim Unattended (CPT 76923), Ugandan to R VMO 5/5 x 1 5min with heat     2. Neuromuscular Re-education (CPT 60436), taping to right patella with medial and superior pull , eliminates pain with squatting    Time-based treatments/modalities:  Therapeutic exercise minutes (CPT 63599): 30 minutes           ASSESSMENT:   Response to treatment:  Patient is demonstrating improved overall strength and activity tolerance.     PLAN/RECOMMENDATIONS:   Plan for treatment: therapy treatment to continue next visit.  Planned interventions for next visit: continue with current treatment.       "

## 2020-05-07 ENCOUNTER — TELEPHONE (OUTPATIENT)
Dept: ONCOLOGY | Facility: MEDICAL CENTER | Age: 35
End: 2020-05-07

## 2020-05-08 ENCOUNTER — OUTPATIENT INFUSION SERVICES (OUTPATIENT)
Dept: ONCOLOGY | Facility: MEDICAL CENTER | Age: 35
End: 2020-05-08
Attending: INTERNAL MEDICINE
Payer: COMMERCIAL

## 2020-05-08 VITALS
RESPIRATION RATE: 18 BRPM | WEIGHT: 171.3 LBS | BODY MASS INDEX: 29.24 KG/M2 | DIASTOLIC BLOOD PRESSURE: 59 MMHG | HEIGHT: 64 IN | OXYGEN SATURATION: 96 % | HEART RATE: 75 BPM | TEMPERATURE: 98.2 F | SYSTOLIC BLOOD PRESSURE: 128 MMHG

## 2020-05-08 PROCEDURE — 700111 HCHG RX REV CODE 636 W/ 250 OVERRIDE (IP): Performed by: INTERNAL MEDICINE

## 2020-05-08 PROCEDURE — 96375 TX/PRO/DX INJ NEW DRUG ADDON: CPT

## 2020-05-08 PROCEDURE — A9270 NON-COVERED ITEM OR SERVICE: HCPCS | Performed by: INTERNAL MEDICINE

## 2020-05-08 PROCEDURE — 96415 CHEMO IV INFUSION ADDL HR: CPT

## 2020-05-08 PROCEDURE — 96413 CHEMO IV INFUSION 1 HR: CPT

## 2020-05-08 PROCEDURE — 700102 HCHG RX REV CODE 250 W/ 637 OVERRIDE(OP): Performed by: INTERNAL MEDICINE

## 2020-05-08 PROCEDURE — 700105 HCHG RX REV CODE 258: Performed by: INTERNAL MEDICINE

## 2020-05-08 RX ORDER — ACETAMINOPHEN 500 MG
1000 TABLET ORAL ONCE
Status: COMPLETED | OUTPATIENT
Start: 2020-05-08 | End: 2020-05-08

## 2020-05-08 RX ORDER — METHYLPREDNISOLONE SODIUM SUCCINATE 40 MG/ML
40 INJECTION, POWDER, LYOPHILIZED, FOR SOLUTION INTRAMUSCULAR; INTRAVENOUS
Status: DISCONTINUED | OUTPATIENT
Start: 2020-05-08 | End: 2020-05-08 | Stop reason: HOSPADM

## 2020-05-08 RX ORDER — DIPHENHYDRAMINE HCL 25 MG
25 TABLET ORAL ONCE
Status: COMPLETED | OUTPATIENT
Start: 2020-05-08 | End: 2020-05-08

## 2020-05-08 RX ADMIN — DIPHENHYDRAMINE HCL 25 MG: 25 TABLET ORAL at 15:22

## 2020-05-08 RX ADMIN — METHYLPREDNISOLONE SODIUM SUCCINATE 40 MG: 40 INJECTION, POWDER, FOR SOLUTION INTRAMUSCULAR; INTRAVENOUS at 15:39

## 2020-05-08 RX ADMIN — INFLIXIMAB 400 MG: 100 INJECTION, POWDER, LYOPHILIZED, FOR SOLUTION INTRAVENOUS at 15:43

## 2020-05-08 RX ADMIN — ACETAMINOPHEN 1000 MG: 500 TABLET, FILM COATED ORAL at 15:22

## 2020-05-08 ASSESSMENT — FIBROSIS 4 INDEX: FIB4 SCORE: 0.48

## 2020-05-08 NOTE — PROGRESS NOTES
Pt presented to infusion center for Q 8 week Remicade. Denies any current s/s of infection or open wounds. PIV started, brisk blood return observed. Remicade infused over 2 hours with filter in place with no s/s of adverse reaction. PIV flushed, removed and gauze and coban dressing placed. Pt has next appt, discharged home to self care.

## 2020-05-11 ENCOUNTER — PHYSICAL THERAPY (OUTPATIENT)
Dept: PHYSICAL THERAPY | Facility: REHABILITATION | Age: 35
End: 2020-05-11
Attending: FAMILY MEDICINE
Payer: COMMERCIAL

## 2020-05-11 DIAGNOSIS — M25.561 ACUTE PAIN OF RIGHT KNEE: ICD-10-CM

## 2020-05-11 PROCEDURE — 97110 THERAPEUTIC EXERCISES: CPT

## 2020-05-11 NOTE — OP THERAPY DAILY TREATMENT
"  Outpatient Physical Therapy  DAILY TREATMENT     [facility] Physical [address].  [address]  [place] [address]  Phone:  [phone]  Fax:  [phone]    Date: 05/11/2020    Patient: Magaly Lynch  YOB: 1985  MRN: [id number]     Time Calculation  Start time: 1100  Stop time: 1145 Time Calculation (min): 45 minutes       Chief Complaint: No chief complaint on file.    Visit #: 8    SUBJECTIVE:  I am doing much better     OBJECTIVE:      Therapeutic Exercises (CPT 50694):     1. Ball wall squats , 20 x     2. Step ups on BOSU , 20x     3. Step ups on 4\" block , 20x     4. Lunges forward- alternating leg , 20x    5. Ball wall squats , 20x    6. Sidelying hip abduction , 20x    7. Standing heel raises , 20x    8. Standing wall hip abduction , 30 sec x 2     9. Step ups on BOSU, 20x/ forwards and sideways     10. Ellipticlal x 5min      Therapeutic Exercise Summary: Access Code: [email or web address]   URL: [email or web address]   Date: 05/11/2020   Prepared by: Tammie Torres     Exercises  · Single Leg Bridge - 10 reps - 2 sets - 1x daily - 7x weekly  · Side Plank with Clam - 10 reps - 2 sets - 1x daily - 7x weekly  · Side Plank with Clam - 10 reps - 2 sets - 1x daily - 7x weekly  · Modified Side Plank with Hip Abduction - 10 reps - 2 sets - 1x daily - 7x weekly  · Quadruped Hip Abduction with Resistance Loop - 10 reps - 2 sets - 1x daily - 7x weekly  · Quadruped Leg Extension with Resistance - 10 reps - 2 sets - 1x daily - 7x weekly  · Wall Squat with Swiss Ball and Resistance Loop - 10 reps - 2 sets - 1x daily - 7x weekly  · Prone Hip Extension on Swiss Ball - 10 reps - 2 sets - 1x daily - 7x weekly      Therapeutic Treatments and Modalities:     1. E Stim Unattended (CPT 72507), Bermudian to R VMO 5/5 x 1 5min with heat     2. Neuromuscular Re-education (CPT 00527), taping to right patella , eliminates pain with squatting, education to patient regarding how to tape at home and " purchase of tape     Time-based treatments/modalities:  Therapeutic exercise minutes (CPT 16871): 40 minutes  Neuromusc re-ed, balance, coor, post minutes (CPT 81656): 5 minutes     1. Patient will score <70% impairment on the WOMAC. GM  2. Patient will report being able to ambulate up and down 10 stairs without being limited by pain. GM  3. Patient will report being able to return to participating in yoga with only minor modifications. GM  4. Patient will be independent in upgraded HEP with written instructions. GM    ASSESSMENT:   Response to treatment: Keep chart open 30 days. If patient presents with pain in next 30 days and wants to return to therapy then recommend progress note.     PLAN/RECOMMENDATIONS:   Plan for treatment: therapy treatment to continue next visit.  Planned interventions for next visit: continue with current treatment.

## 2020-05-11 NOTE — OP THERAPY DISCHARGE SUMMARY
Outpatient Physical Therapy  DISCHARGE SUMMARY NOTE      [facility] Physical Therapy [address]  [address]  [place] [address]  Phone:  [phone]  Fax:  [phone]    Date of Visit: 05/11/2020    Patient: Magaly Lynch  YOB: 1985  MRN: [id number]     Referring Provider: Jasmin Palencia M.D.  [address] [place] [address]   Referring Diagnosis Acute pain of right knee [M25.561]     Physical Therapy Occurrence Codes    Date of onset of impairment:  2/23/20   Date physical therapy care plan established or reviewed:  3/19/20   Date physical therapy treatment started:  3/19/20          Functional Assessment Used  WOMAC Grand Total: 6.25     Your patient is being discharged from Physical Therapy with the following comments:   · Goals met      Recommendations:  D/C from PT at this time.     Tammie Torres, PT, DPT    Date: 5/11/2020

## 2020-05-12 DIAGNOSIS — D21.9 FIBROID: ICD-10-CM

## 2020-05-12 RX ORDER — ETONOGESTREL AND ETHINYL ESTRADIOL VAGINAL RING .015; .12 MG/D; MG/D
RING VAGINAL
Qty: 3 EACH | Refills: 4 | Status: SHIPPED
Start: 2020-05-12 | End: 2021-02-25 | Stop reason: SDUPTHER

## 2020-06-18 ENCOUNTER — TELEPHONE (OUTPATIENT)
Dept: PHYSICAL THERAPY | Facility: MEDICAL CENTER | Age: 35
End: 2020-06-18

## 2020-06-18 NOTE — OP THERAPY DISCHARGE SUMMARY
Outpatient Physical Therapy  DISCHARGE SUMMARY NOTE      [facility] Outpatient Physical Therapy  [address]  [place] [address]  Phone:  [phone]  Fax:  [phone]    Date of Visit: 06/18/2020    Patient: Magaly Lynch  YOB: 1985  MRN: [id number]     Referring Provider: No referring provider defined for this encounter.   Referring Diagnosis No admission diagnoses are documented for this encounter.         Functional Assessment Used        Your patient is being discharged from Physical Therapy with the following comments:   · Goals met    Comments:  Patient improved significantly with PT. Patient able to return to yoga and walking. Patient is compliant with HEP.       Recommendations:  D/C from PT at this time.     Tammie Torres, PT, DPT    Date: 6/18/2020

## 2020-07-02 ENCOUNTER — TELEPHONE (OUTPATIENT)
Dept: ONCOLOGY | Facility: MEDICAL CENTER | Age: 35
End: 2020-07-02

## 2020-07-03 ENCOUNTER — OUTPATIENT INFUSION SERVICES (OUTPATIENT)
Dept: ONCOLOGY | Facility: MEDICAL CENTER | Age: 35
End: 2020-07-03
Attending: INTERNAL MEDICINE
Payer: COMMERCIAL

## 2020-07-03 VITALS
OXYGEN SATURATION: 97 % | SYSTOLIC BLOOD PRESSURE: 118 MMHG | HEIGHT: 64 IN | BODY MASS INDEX: 29.06 KG/M2 | WEIGHT: 170.19 LBS | RESPIRATION RATE: 18 BRPM | DIASTOLIC BLOOD PRESSURE: 75 MMHG | TEMPERATURE: 97.2 F | HEART RATE: 64 BPM

## 2020-07-03 PROCEDURE — 700111 HCHG RX REV CODE 636 W/ 250 OVERRIDE (IP): Performed by: INTERNAL MEDICINE

## 2020-07-03 PROCEDURE — 96413 CHEMO IV INFUSION 1 HR: CPT

## 2020-07-03 PROCEDURE — 700102 HCHG RX REV CODE 250 W/ 637 OVERRIDE(OP): Performed by: INTERNAL MEDICINE

## 2020-07-03 PROCEDURE — 700105 HCHG RX REV CODE 258: Performed by: INTERNAL MEDICINE

## 2020-07-03 PROCEDURE — A9270 NON-COVERED ITEM OR SERVICE: HCPCS | Performed by: INTERNAL MEDICINE

## 2020-07-03 PROCEDURE — 96415 CHEMO IV INFUSION ADDL HR: CPT

## 2020-07-03 PROCEDURE — 96375 TX/PRO/DX INJ NEW DRUG ADDON: CPT

## 2020-07-03 RX ORDER — METHYLPREDNISOLONE SODIUM SUCCINATE 40 MG/ML
40 INJECTION, POWDER, LYOPHILIZED, FOR SOLUTION INTRAMUSCULAR; INTRAVENOUS
Status: DISCONTINUED | OUTPATIENT
Start: 2020-07-03 | End: 2020-07-03 | Stop reason: HOSPADM

## 2020-07-03 RX ORDER — ACETAMINOPHEN 500 MG
1000 TABLET ORAL ONCE
Status: COMPLETED | OUTPATIENT
Start: 2020-07-03 | End: 2020-07-03

## 2020-07-03 RX ORDER — DIPHENHYDRAMINE HCL 25 MG
25 TABLET ORAL ONCE
Status: COMPLETED | OUTPATIENT
Start: 2020-07-03 | End: 2020-07-03

## 2020-07-03 RX ADMIN — DIPHENHYDRAMINE HYDROCHLORIDE 25 MG: 25 TABLET ORAL at 15:20

## 2020-07-03 RX ADMIN — METHYLPREDNISOLONE SODIUM SUCCINATE 40 MG: 40 INJECTION, POWDER, FOR SOLUTION INTRAMUSCULAR; INTRAVENOUS at 15:25

## 2020-07-03 RX ADMIN — ACETAMINOPHEN 1000 MG: 500 TABLET ORAL at 15:20

## 2020-07-03 RX ADMIN — INFLIXIMAB 400 MG: 100 INJECTION, POWDER, LYOPHILIZED, FOR SOLUTION INTRAVENOUS at 15:37

## 2020-07-03 ASSESSMENT — FIBROSIS 4 INDEX: FIB4 SCORE: 0.48

## 2020-07-03 NOTE — PROGRESS NOTES
Pt arrived to IS, ambulatory, for Remicade infusion. Pt voices no complaints. Pt denies any active infections. 24g PIV established in the R-AC, positive blood return noted. Pre-medications administered with no complications. Remicade infusion started at 2 hour rate, currently infusing with no complications. Report given to TOMÁS Stearns.

## 2020-07-04 NOTE — PROGRESS NOTES
Assumed care of pt care from Hailee ENGLAND. Filomena completed. Line flushed clear. IV flushed and removed. Pressure dressing applied. Pt knows when to return, discharged home under self care in no apparent distress.

## 2020-08-27 ENCOUNTER — TELEPHONE (OUTPATIENT)
Dept: ONCOLOGY | Facility: MEDICAL CENTER | Age: 35
End: 2020-08-27

## 2020-09-05 ENCOUNTER — TELEPHONE (OUTPATIENT)
Dept: ONCOLOGY | Facility: MEDICAL CENTER | Age: 35
End: 2020-09-05

## 2020-09-06 ENCOUNTER — OUTPATIENT INFUSION SERVICES (OUTPATIENT)
Dept: ONCOLOGY | Facility: MEDICAL CENTER | Age: 35
End: 2020-09-06
Attending: INTERNAL MEDICINE
Payer: COMMERCIAL

## 2020-09-06 VITALS
WEIGHT: 169.97 LBS | HEIGHT: 64 IN | RESPIRATION RATE: 18 BRPM | BODY MASS INDEX: 29.02 KG/M2 | TEMPERATURE: 97.8 F | SYSTOLIC BLOOD PRESSURE: 119 MMHG | OXYGEN SATURATION: 98 % | HEART RATE: 65 BPM | DIASTOLIC BLOOD PRESSURE: 71 MMHG

## 2020-09-06 DIAGNOSIS — K50.019 CROHN'S DISEASE OF SMALL INTESTINE WITH COMPLICATION (HCC): ICD-10-CM

## 2020-09-06 PROCEDURE — 700102 HCHG RX REV CODE 250 W/ 637 OVERRIDE(OP): Performed by: INTERNAL MEDICINE

## 2020-09-06 PROCEDURE — 96413 CHEMO IV INFUSION 1 HR: CPT

## 2020-09-06 PROCEDURE — 96366 THER/PROPH/DIAG IV INF ADDON: CPT

## 2020-09-06 PROCEDURE — 700111 HCHG RX REV CODE 636 W/ 250 OVERRIDE (IP): Performed by: INTERNAL MEDICINE

## 2020-09-06 PROCEDURE — 700105 HCHG RX REV CODE 258: Performed by: INTERNAL MEDICINE

## 2020-09-06 PROCEDURE — 96365 THER/PROPH/DIAG IV INF INIT: CPT

## 2020-09-06 PROCEDURE — 96415 CHEMO IV INFUSION ADDL HR: CPT

## 2020-09-06 PROCEDURE — 96375 TX/PRO/DX INJ NEW DRUG ADDON: CPT

## 2020-09-06 PROCEDURE — A9270 NON-COVERED ITEM OR SERVICE: HCPCS | Performed by: INTERNAL MEDICINE

## 2020-09-06 RX ORDER — METHYLPREDNISOLONE SODIUM SUCCINATE 40 MG/ML
40 INJECTION, POWDER, LYOPHILIZED, FOR SOLUTION INTRAMUSCULAR; INTRAVENOUS
Status: DISCONTINUED | OUTPATIENT
Start: 2020-09-06 | End: 2020-09-06 | Stop reason: HOSPADM

## 2020-09-06 RX ORDER — DIPHENHYDRAMINE HCL 25 MG
25 TABLET ORAL ONCE
Status: COMPLETED | OUTPATIENT
Start: 2020-09-06 | End: 2020-09-06

## 2020-09-06 RX ORDER — ACETAMINOPHEN 500 MG
1000 TABLET ORAL ONCE
Status: COMPLETED | OUTPATIENT
Start: 2020-09-06 | End: 2020-09-06

## 2020-09-06 RX ADMIN — DIPHENHYDRAMINE HYDROCHLORIDE 25 MG: 25 TABLET ORAL at 15:51

## 2020-09-06 RX ADMIN — METHYLPREDNISOLONE SODIUM SUCCINATE 40 MG: 40 INJECTION, POWDER, FOR SOLUTION INTRAMUSCULAR; INTRAVENOUS at 15:52

## 2020-09-06 RX ADMIN — ACETAMINOPHEN 1000 MG: 500 TABLET ORAL at 15:52

## 2020-09-06 RX ADMIN — INFLIXIMAB 400 MG: 100 INJECTION, POWDER, LYOPHILIZED, FOR SOLUTION INTRAVENOUS at 16:15

## 2020-09-06 ASSESSMENT — FIBROSIS 4 INDEX: FIB4 SCORE: 0.48

## 2020-09-06 NOTE — PROGRESS NOTES
Magaly is receiving her 2hr remicade, 35 minutes in patient is tolerating the drug without any complications. Patient was premedicated with PO tylenol, PO benadryl, and IV solu-medrol. 24g PIV placed in R AC without issue, positive blood return noted prior to infusion. Report given to closing nurse assuming patients care.

## 2020-09-07 NOTE — PROGRESS NOTES
Assumed care of pt from TOMÁS Cooper for remainder of Remicade, and pt tolerated well. No signs or symptoms of reaction or complications noted. PIV flushed and removed; sterile gauze/coban applied to site. Pt states she has next appointment. Pt discharged home to self care in [facility] at this time.

## 2020-10-06 DIAGNOSIS — K50.00 CROHN'S DISEASE OF SMALL INTESTINE WITHOUT COMPLICATION (HCC): ICD-10-CM

## 2020-10-06 RX ORDER — DIPHENHYDRAMINE HCL 25 MG
25 TABLET ORAL ONCE
Status: CANCELLED | OUTPATIENT
Start: 2020-11-06

## 2020-10-06 RX ORDER — METHYLPREDNISOLONE SODIUM SUCCINATE 125 MG/2ML
40 INJECTION, POWDER, LYOPHILIZED, FOR SOLUTION INTRAMUSCULAR; INTRAVENOUS PRN
Status: CANCELLED | OUTPATIENT
Start: 2020-11-06

## 2020-10-06 RX ORDER — ACETAMINOPHEN 500 MG
1000 TABLET ORAL ONCE
Status: CANCELLED | OUTPATIENT
Start: 2020-11-06

## 2020-10-06 RX ORDER — SODIUM CHLORIDE 9 MG/ML
INJECTION, SOLUTION INTRAVENOUS CONTINUOUS
Status: CANCELLED | OUTPATIENT
Start: 2020-11-06

## 2020-11-02 ENCOUNTER — NON-PROVIDER VISIT (OUTPATIENT)
Dept: MEDICAL GROUP | Facility: IMAGING CENTER | Age: 35
End: 2020-11-02
Payer: COMMERCIAL

## 2020-11-02 DIAGNOSIS — Z23 NEED FOR VACCINATION: ICD-10-CM

## 2020-11-02 PROCEDURE — 90471 IMMUNIZATION ADMIN: CPT | Performed by: FAMILY MEDICINE

## 2020-11-02 PROCEDURE — 90686 IIV4 VACC NO PRSV 0.5 ML IM: CPT | Performed by: FAMILY MEDICINE

## 2020-11-02 NOTE — PROGRESS NOTES
"Magaly Lynch is a 35 y.o. female here for a non-provider visit for:   FLU    Reason for immunization: Annual Flu Vaccine  Immunization records indicate need for vaccine: Yes, confirmed with Epic  Minimum interval has been met for this vaccine: Yes  ABN completed: Not Indicated    Order and dose verified by: N/a  VIS Dated  08/15/19 was given to patient: Yes  All IAC Questionnaire questions were answered \"No.\"    Patient tolerated injection and no adverse effects were observed or reported: Yes    Pt scheduled for next dose in series: Not Indicated    "

## 2020-11-06 ENCOUNTER — OUTPATIENT INFUSION SERVICES (OUTPATIENT)
Dept: ONCOLOGY | Facility: MEDICAL CENTER | Age: 35
End: 2020-11-06
Attending: INTERNAL MEDICINE
Payer: COMMERCIAL

## 2020-11-06 VITALS
TEMPERATURE: 97.5 F | HEART RATE: 87 BPM | HEIGHT: 64 IN | RESPIRATION RATE: 18 BRPM | SYSTOLIC BLOOD PRESSURE: 124 MMHG | WEIGHT: 173.5 LBS | DIASTOLIC BLOOD PRESSURE: 82 MMHG | OXYGEN SATURATION: 98 % | BODY MASS INDEX: 29.62 KG/M2

## 2020-11-06 DIAGNOSIS — K50.00 CROHN'S DISEASE OF SMALL INTESTINE WITHOUT COMPLICATION (HCC): ICD-10-CM

## 2020-11-06 PROCEDURE — 700105 HCHG RX REV CODE 258: Performed by: INTERNAL MEDICINE

## 2020-11-06 PROCEDURE — 96375 TX/PRO/DX INJ NEW DRUG ADDON: CPT

## 2020-11-06 PROCEDURE — 700102 HCHG RX REV CODE 250 W/ 637 OVERRIDE(OP): Performed by: INTERNAL MEDICINE

## 2020-11-06 PROCEDURE — 96415 CHEMO IV INFUSION ADDL HR: CPT

## 2020-11-06 PROCEDURE — 700111 HCHG RX REV CODE 636 W/ 250 OVERRIDE (IP): Performed by: INTERNAL MEDICINE

## 2020-11-06 PROCEDURE — A9270 NON-COVERED ITEM OR SERVICE: HCPCS | Performed by: INTERNAL MEDICINE

## 2020-11-06 PROCEDURE — 96413 CHEMO IV INFUSION 1 HR: CPT

## 2020-11-06 RX ORDER — METHYLPREDNISOLONE SODIUM SUCCINATE 125 MG/2ML
40 INJECTION, POWDER, LYOPHILIZED, FOR SOLUTION INTRAMUSCULAR; INTRAVENOUS PRN
Status: CANCELLED | OUTPATIENT
Start: 2020-11-06

## 2020-11-06 RX ORDER — SODIUM CHLORIDE 9 MG/ML
INJECTION, SOLUTION INTRAVENOUS CONTINUOUS
Status: CANCELLED | OUTPATIENT
Start: 2020-11-06

## 2020-11-06 RX ORDER — ADAPALENE GEL USP, 0.3% 3 MG/G
GEL TOPICAL
COMMUNITY
Start: 2020-10-16

## 2020-11-06 RX ORDER — DIPHENHYDRAMINE HCL 25 MG
25 TABLET ORAL ONCE
Status: COMPLETED | OUTPATIENT
Start: 2020-11-06 | End: 2020-11-06

## 2020-11-06 RX ORDER — METHYLPREDNISOLONE SODIUM SUCCINATE 125 MG/2ML
40 INJECTION, POWDER, LYOPHILIZED, FOR SOLUTION INTRAMUSCULAR; INTRAVENOUS PRN
Status: COMPLETED | OUTPATIENT
Start: 2020-11-06 | End: 2020-11-06

## 2020-11-06 RX ORDER — DIPHENHYDRAMINE HCL 25 MG
25 TABLET ORAL ONCE
Status: CANCELLED | OUTPATIENT
Start: 2020-11-06 | End: 2020-11-06

## 2020-11-06 RX ORDER — ACETAMINOPHEN 500 MG
1000 TABLET ORAL ONCE
Status: CANCELLED | OUTPATIENT
Start: 2020-11-06

## 2020-11-06 RX ORDER — ACETAMINOPHEN 500 MG
1000 TABLET ORAL ONCE
Status: COMPLETED | OUTPATIENT
Start: 2020-11-06 | End: 2020-11-06

## 2020-11-06 RX ADMIN — METHYLPREDNISOLONE SODIUM SUCCINATE 40 MG: 125 INJECTION, POWDER, FOR SOLUTION INTRAMUSCULAR; INTRAVENOUS at 15:42

## 2020-11-06 RX ADMIN — SODIUM CHLORIDE 400 MG: 9 INJECTION, SOLUTION INTRAVENOUS at 16:00

## 2020-11-06 RX ADMIN — DIPHENHYDRAMINE HYDROCHLORIDE 25 MG: 25 TABLET ORAL at 15:41

## 2020-11-06 RX ADMIN — ACETAMINOPHEN 1000 MG: 500 TABLET ORAL at 15:41

## 2020-11-06 ASSESSMENT — FIBROSIS 4 INDEX: FIB4 SCORE: .4948716593053935123

## 2020-11-06 NOTE — PROGRESS NOTES
[facility] for chrons. PIV established in RAC; brisk blood return observed and pt tolerated well. Infliximab-dybb infusing with filter over 2 hours with no current s/s of adverse reactions. Report given off to TOMÁS Bills; all questions answered at this time.

## 2020-11-07 NOTE — PROGRESS NOTES
Received report from TOMÁS Weaver.  Pt completed INflectra, tolerated well.  PIV flushed, removed, and gauze/coban placed.  Pt ambulated out of clinic in no apparent distress.

## 2020-12-31 ENCOUNTER — TELEPHONE (OUTPATIENT)
Dept: ONCOLOGY | Facility: MEDICAL CENTER | Age: 35
End: 2020-12-31

## 2021-01-12 ENCOUNTER — TELEMEDICINE (OUTPATIENT)
Dept: MEDICAL GROUP | Facility: IMAGING CENTER | Age: 36
End: 2021-01-12
Payer: COMMERCIAL

## 2021-01-12 VITALS — WEIGHT: 168 LBS | HEIGHT: 64 IN | BODY MASS INDEX: 28.68 KG/M2

## 2021-01-12 DIAGNOSIS — M25.559 LATERAL PAIN OF HIP: ICD-10-CM

## 2021-01-12 PROCEDURE — 99213 OFFICE O/P EST LOW 20 MIN: CPT | Mod: 95,CR | Performed by: FAMILY MEDICINE

## 2021-01-12 RX ORDER — INFLIXIMAB 100 MG/10ML
INJECTION, POWDER, LYOPHILIZED, FOR SOLUTION INTRAVENOUS
COMMUNITY

## 2021-01-12 ASSESSMENT — PATIENT HEALTH QUESTIONNAIRE - PHQ9: CLINICAL INTERPRETATION OF PHQ2 SCORE: 0

## 2021-01-12 ASSESSMENT — FIBROSIS 4 INDEX: FIB4 SCORE: .4948716593053935123

## 2021-01-12 NOTE — PROGRESS NOTES
Telemedicine Visit: New Patient     This encounter was conducted via Zoom.   Verbal consent was obtained. Patient's identity was verified. Patient aware this will be   billed the same as an in person evaluation.  Patient in home, I am in office at [address] [place] [address]  Subjective:     Chief Complaint   Patient presents with   • Pain     Lateral hip       Magaly Lynch is a 35 y.o. female with history of crohn's disease on virtual visit to discuss right lateral hip pain. She started jogging in October 2020 (just over 3 months ago) but a month later started to develop hip pain of her lateral right hip.  No trauma. No popping.  Now even walking is painful.  She tried stretching, foam rolling, strengthening exercises, ibuprofen, and held off on jogging for two weeks but it has not improved. Pushing on back right side of lliac crest (patient shows me virtually) helps while she is walking. Normal gait per the patient without a limp unless going upstairs. Going upstairs causes a lot of pain.     Radiates from the lateral hip (iliac crest) around to the low back. sciatica slightly acting up again. No radiating down lateral side of the leg and no tenderness when she presses on the lateral aspect of the leg. Mom had bursitis.     She has been having a hard time getting remicade due to insurance. She has been able to advocate for herself and will be using an independent infusion center soon to restart this. Though feels inflammation of her body could also be making this worse.     No fevers or chills or redness over the area.     ROS  See HPI  Constitutional: Negative for fever, chills and malaise/fatigue.   HENT: Negative for congestion, itchy watery eyes  Eyes: Negative for pain or sudden changes in vision  Respiratory: Negative for cough and shortness of breath.    Cardiovascular: Negative for leg swelling or chest pain  Gastrointestinal: Negative for nausea, vomiting, abdominal pain and diarrhea.  "  Genitourinary: Negative for dysuria and hematuria.   Skin: Negative for rash or concerning moles   Neurological: Negative for dizziness, focal weakness   Psychiatric/Behavioral: Negative for depression.  The patient is not nervous/anxious.    Musculoskeletal: no weakness or joint stiffness    Allergies   Allergen Reactions   • Hydrocortisone Sod Succinate      Patient notes that it \"made her pass out\". Use solu-medrol for infusion premedication   • Sumatriptan Shortness of Breath   • Infliximab      15min into infusion at 10cc/hr, pt reported chest tightness/ ticklish feeling and cough. Infusion stopped. O2 supplement/Benadryl /albuterol administered. Monitored for about 2hrs and discharged in stable condition. VSS throughout the treatment.    • Cedarwood Oil    • Grass Extracts [Gramineae Pollens]    • Lidocaine      Nasal spray causes fainting (dental shots were okay)   • Poison [place] Extract [Extract Of Poison Hackberry]    • Pollen Extract    • Prochlorperazine      distonic   • Promethazine      distonic   • Zolmitriptan      sleepy   • Azathioprine Itching and Rash       Current medicines (including changes today)  Current Outpatient Medications   Medication Sig Dispense Refill   • inFLIXimab (REMICADE) 100 MG Recon Soln Infuse  into a venous catheter.     • Adapalene 0.3 % Gel      • ethinyl estradiol-etonogestrel (NUVARING) 0.12-0.015 MG/24HR vaginal ring 1 ring PV x3wk, off x1wk. 3 Each 4   • Omega-3 Fatty Acids (FISH OIL) 1000 MG Cap capsule Take 1,000 mg by mouth every day.     • therapeutic multivitamin-minerals (THERAGRAN-M) Tab Take 1 Tab by mouth every day.     • Probiotic Product (PROBIOTIC-10 PO) Take  by mouth.       No current facility-administered medications for this visit.        She  has a past medical history of Crohn's disease (HCC) (20111), Hip pain, bilateral, Kidney stone (2008 to 2015), Migraines (1998), MRSA infection, and Ovarian cyst.  She  has a past surgical history that includes dental " "extraction(s).      Family History   Problem Relation Age of Onset   • Breast Cancer Mother 57        no genetic markers   • Other Mother         MS, fibroids/hysterectomy   • Cancer Maternal Grandmother         uterine or fibrioids?, breast, skin, lung   • Lung Disease Maternal Grandmother         emphysema   • Psychiatric Illness Maternal Grandmother         suicide   • GI Disease Maternal Grandfather         Crohn's   • GI Disease Maternal Aunt         Crohn's     Family Status   Relation Name Status   • Mo  Alive   • Fa estranged (Not Specified)   • MGMo     • MGFa  Alive   • MAunt  Alive       Patient Active Problem List    Diagnosis Date Noted   • Crohn's disease of small intestine without complication (HCC) 10/06/2020   • Acute pain of right knee 2020   • Fibroid 2020   • Nephrolithiasis 2020   • Recurrent sinusitis 2020   • Crohn's disease (HCC) 2019   • Intractable migraine with aura without status migrainosus 2019   • Healthcare maintenance 2012          Objective:   Vitals obtained by patient:  Ht 1.626 m (5' 4\")   Wt 76.2 kg (168 lb)   BMI 28.84 kg/m²     Physical Exam:  Constitutional: Alert, no distress, well-groomed.  Skin: No rashes in visible areas.  Eye: Round. Conjunctiva clear, lids normal. No icterus.   ENMT: Lips pink without lesions, good dentition, moist mucous membranes. Phonation normal.  Neck: No masses, no thyromegaly. Moves freely without pain.  CV: Pulse as reported by patient  Respiratory: Unlabored respiratory effort, no cough or audible wheeze  Psych: Alert and oriented x3, normal affect and mood.   Neuro: symmetric face. Alert and oriented. Follows commands. No aphasia or dysarthria.    Labs:  No visits with results within 1 Month(s) from this visit.   Latest known visit with results is:   Hospital Outpatient Visit on 2020   Component Date Value Ref Range Status   • Sodium 2020 140  135 - 145 mmol/L Final   • Potassium " 02/14/2020 4.7  3.6 - 5.5 mmol/L Final   • Chloride 02/14/2020 109  96 - 112 mmol/L Final   • Co2 02/14/2020 23  20 - 33 mmol/L Final   • Anion Gap 02/14/2020 8.0  0.0 - 11.9 Final   • Glucose 02/14/2020 86  65 - 99 mg/dL Final   • Bun 02/14/2020 12  8 - 22 mg/dL Final   • Creatinine 02/14/2020 0.88  0.50 - 1.40 mg/dL Final   • Calcium 02/14/2020 9.2  8.5 - 10.5 mg/dL Final   • AST(SGOT) 02/14/2020 16  12 - 45 U/L Final   • ALT(SGPT) 02/14/2020 12  2 - 50 U/L Final   • Alkaline Phosphatase 02/14/2020 38  30 - 99 U/L Final   • Total Bilirubin 02/14/2020 0.4  0.1 - 1.5 mg/dL Final   • Albumin 02/14/2020 3.9  3.2 - 4.9 g/dL Final   • Total Protein 02/14/2020 7.1  6.0 - 8.2 g/dL Final   • Globulin 02/14/2020 3.2  1.9 - 3.5 g/dL Final   • A-G Ratio 02/14/2020 1.2  g/dL Final   • WBC 02/14/2020 5.3  4.8 - 10.8 K/uL Final   • RBC 02/14/2020 4.59  4.20 - 5.40 M/uL Final   • Hemoglobin 02/14/2020 14.5  12.0 - 16.0 g/dL Final   • Hematocrit 02/14/2020 43.5  37.0 - 47.0 % Final   • MCV 02/14/2020 94.8  81.4 - 97.8 fL Final   • MCH 02/14/2020 31.6  27.0 - 33.0 pg Final   • MCHC 02/14/2020 33.3* 33.6 - 35.0 g/dL Final   • RDW 02/14/2020 40.7  35.9 - 50.0 fL Final   • Platelet Count 02/14/2020 308  164 - 446 K/uL Final   • MPV 02/14/2020 10.2  9.0 - 12.9 fL Final   • Neutrophils-Polys 02/14/2020 51.80  44.00 - 72.00 % Final   • Lymphocytes 02/14/2020 38.10  22.00 - 41.00 % Final   • Monocytes 02/14/2020 5.50  0.00 - 13.40 % Final   • Eosinophils 02/14/2020 2.50  0.00 - 6.90 % Final   • Basophils 02/14/2020 1.30  0.00 - 1.80 % Final   • Immature Granulocytes 02/14/2020 0.80  0.00 - 0.90 % Final   • Nucleated RBC 02/14/2020 0.00  /100 WBC Final   • Neutrophils (Absolute) 02/14/2020 2.72  2.00 - 7.15 K/uL Final    Includes immature neutrophils, if present.   • Lymphs (Absolute) 02/14/2020 2.00  1.00 - 4.80 K/uL Final   • Monos (Absolute) 02/14/2020 0.29  0.00 - 0.85 K/uL Final   • Eos (Absolute) 02/14/2020 0.13  0.00 - 0.51 K/uL  Final   • Baso (Absolute) 02/14/2020 0.07  0.00 - 0.12 K/uL Final   • Immature Granulocytes (abs) 02/14/2020 0.04  0.00 - 0.11 K/uL Final   • NRBC (Absolute) 02/14/2020 0.00  K/uL Final   • GFR If  02/14/2020 >60  >60 mL/min/1.73 m 2 Final   • GFR If Non  02/14/2020 >60  >60 mL/min/1.73 m 2 Final       Imaging:   No results found.    Assessment and Plan:   The following treatment plan was discussed:   Since she is already rested she can get an x-ray and then continue to physical therapy.  Sports medicine referral only if therapy does not help.  Follow-up with me if she wants to try like a muscle relaxer or if the pain is too much for over-the-counter medications  Problem List Items Addressed This Visit     None      Visit Diagnoses     Lateral pain of hip        Relevant Orders    REFERRAL TO SPORTS MEDICINE    DX-HIP-COMPLETE - UNILATERAL 2+ RIGHT    DX-LUMBAR SPINE-2 OR 3 VIEWS    REFERRAL TO PHYSICAL THERAPY          Follow-up: Return if symptoms worsen or fail to improve.        Portions of this note may be dictated using [employer] voice recognition software.  Variances in spelling and vocabulary are possible and unintentional.  Not all areas may be caught/corrected.  Please notify me if any discrepancies are noted or if the meaning of any statement is not correct/clear.

## 2021-01-19 ENCOUNTER — OFFICE VISIT (OUTPATIENT)
Dept: MEDICAL GROUP | Facility: CLINIC | Age: 36
End: 2021-01-19
Payer: COMMERCIAL

## 2021-01-19 VITALS
WEIGHT: 168 LBS | RESPIRATION RATE: 16 BRPM | SYSTOLIC BLOOD PRESSURE: 122 MMHG | DIASTOLIC BLOOD PRESSURE: 80 MMHG | OXYGEN SATURATION: 98 % | HEART RATE: 80 BPM | TEMPERATURE: 98.4 F | BODY MASS INDEX: 28.68 KG/M2 | HEIGHT: 64 IN

## 2021-01-19 DIAGNOSIS — M76.01 GLUTEAL TENDINITIS, RIGHT HIP: ICD-10-CM

## 2021-01-19 DIAGNOSIS — K50.00 CROHN'S DISEASE OF SMALL INTESTINE WITHOUT COMPLICATION (HCC): ICD-10-CM

## 2021-01-19 DIAGNOSIS — M70.61 TROCHANTERIC BURSITIS, RIGHT HIP: ICD-10-CM

## 2021-01-19 PROCEDURE — 99203 OFFICE O/P NEW LOW 30 MIN: CPT | Performed by: FAMILY MEDICINE

## 2021-01-19 ASSESSMENT — ENCOUNTER SYMPTOMS
DIZZINESS: 0
CHILLS: 0
NAUSEA: 0
VOMITING: 0
SHORTNESS OF BREATH: 0
FEVER: 0

## 2021-01-19 ASSESSMENT — FIBROSIS 4 INDEX: FIB4 SCORE: .4948716593053935123

## 2021-01-19 NOTE — Clinical Note
Jay Castellanos,  Thank you for referring Magaly to our sports medicine clinic.  I think she will benefit from physical therapy.  We also demonstrated some IT band stretches in the office.    We will see her back to see how she is doing after therapy.    Thanks!    L

## 2021-01-19 NOTE — PROGRESS NOTES
Subjective:      Magaly Lynch is a 35 y.o. female who presents with Hip Pain (Referral from PCP/ R hip pain )     Referred by Jasmin Palencia M.D.  for evaluation of RIGHT hip pain    HPI   RIGHT hip pain  Started jogging in October  Mid November she started experiencing lateral hip pain in the RIGHT side  In the past 2 weeks she has been having pain in the RIGHT lower back  Is a burning pain in the ribs her RIGHT hip and also pain at the RIGHT SI joint  He has had intermittent sharp pain deep in the hip joint as well  Worse with performing a hip stretch/leaning to the RIGHT side (IT band like stretch)  Improved with lying flat or prone  Symptoms are worse with sleeping on the RIGHT side and with crossing her legs  She does take ibuprofen which helps some  Is also tried THC which has also helped her hip pain  Denies any prior issues with the RIGHT hip  She has seen a chiropractor and acupuncturist in the past for lower lumbar spine pain    History of right knee years meniscal tear in 2020 doing yoga  Underwent formal physical therapy,     Likes doing yoga and Pilates  History of Crohn's disease, on Remicade treatment  Also has prior history of CRPS after LEFT ankle fracture and RIGHT knee injury which required desensitization therapy    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   Respiratory: Negative for shortness of breath.    Cardiovascular: Negative for chest pain.   Gastrointestinal: Negative for nausea and vomiting.   Neurological: Negative for dizziness.     PMH:  has a past medical history of Crohn's disease ([facility]) (20111), Hip pain, bilateral, Kidney stone (2008 to 2015), Migraines (1998), MRSA infection, and Ovarian cyst.  MEDS:   Current Outpatient Medications:   •  inFLIXimab (REMICADE) 100 MG Recon Soln, Infuse  into a venous catheter., Disp: , Rfl:   •  Adapalene  "0.3 % Gel, , Disp: , Rfl:   •  ethinyl estradiol-etonogestrel (NUVARING) 0.12-0.015 MG/24HR vaginal ring, 1 ring PV x3wk, off x1wk., Disp: 3 Each, Rfl: 4  •  Omega-3 Fatty Acids (FISH OIL) 1000 MG Cap capsule, Take 1,000 mg by mouth every day., Disp: , Rfl:   •  therapeutic multivitamin-minerals (THERAGRAN-M) Tab, Take 1 Tab by mouth every day., Disp: , Rfl:   •  Probiotic Product (PROBIOTIC-10 PO), Take  by mouth., Disp: , Rfl:   ALLERGIES:   Allergies   Allergen Reactions   • Hydrocortisone Sod Succinate      Patient notes that it \"made her pass out\". Use solu-medrol for infusion premedication   • Sumatriptan Shortness of Breath   • Infliximab      15min into infusion at 10cc/hr, pt reported chest tightness/ ticklish feeling and cough. Infusion stopped. O2 supplement/Benadryl /albuterol administered. Monitored for about 2hrs and discharged in stable condition. VSS throughout the treatment.    • Cedarwood Oil    • Grass Extracts [Gramineae Pollens]    • Lidocaine      Nasal spray causes fainting (dental shots were okay)   • Poison [place] Extract [Extract Of Poison [place]]    • Pollen Extract    • Prochlorperazine      distonic   • Promethazine      distonic   • Zolmitriptan      sleepy   • Azathioprine Itching and Rash     SURGHX:   Past Surgical History:   Procedure Laterality Date   • DENTAL EXTRACTION(S)       SOCHX:  reports that she has never smoked. She has never used smokeless tobacco. She reports current alcohol use. She reports current drug use. Drug: Marijuana.  FH: Family history was reviewed, no pertinent findings to report     Objective:     /80 (BP Location: Left arm, Patient Position: Sitting, BP Cuff Size: Adult)   Pulse 80   Temp 36.9 °C (98.4 °F) (Temporal)   Resp 16   Ht 1.626 m (5' 4\")   Wt 76.2 kg (168 lb)   SpO2 98%   BMI 28.84 kg/m²      Physical Exam     HIP EXAM:  NORMAL gait    Right hip: Range of motion is intact  NEGATIVE pain with internal rotation  kevin's test is " POSITIVE  POSITIVE tenderness of the trochanteric bursa  POSITIVE tenderness of the gluteus medius  Sofía's test is POSITIVE  POSITIVE SI tenderness on the RIGHT    Left hip: Range of motion is intact  NEGATIVE pain with internal rotation  kevin's test is NEGATIVE  NO tenderness of the trochanteric bursa  NO tenderness of the gluteus medius  Sofía's test is NEGATIVE       Assessment/Plan:        1. Trochanteric bursitis, right hip     2. Gluteal tendinitis, right hip     3. Crohn's disease of small intestine without complication (HCC)       SI belt  ITB stretch   ALLERGIC to steroid shot    Masto?  Recommend trying Zyrtec for 1 month to see if it helps her generalized systemic symptoms    F/u 1 month  To see how she is doing with home exercise program/stretches/Pilates and SI belt  She is also pending physical therapy which was ordered by PCP    Thank you Jasmin Palencia M.D. for allowing me to participate in caring for your patient.

## 2021-01-21 ENCOUNTER — HOSPITAL ENCOUNTER (OUTPATIENT)
Dept: RADIOLOGY | Facility: MEDICAL CENTER | Age: 36
End: 2021-01-21
Attending: FAMILY MEDICINE
Payer: COMMERCIAL

## 2021-01-21 DIAGNOSIS — M25.559 LATERAL PAIN OF HIP: ICD-10-CM

## 2021-01-21 DIAGNOSIS — D21.9 FIBROID: ICD-10-CM

## 2021-01-21 PROCEDURE — 72100 X-RAY EXAM L-S SPINE 2/3 VWS: CPT

## 2021-01-21 PROCEDURE — 73502 X-RAY EXAM HIP UNI 2-3 VIEWS: CPT | Mod: RT

## 2021-01-21 PROCEDURE — 76830 TRANSVAGINAL US NON-OB: CPT

## 2021-02-01 ENCOUNTER — PHYSICAL THERAPY (OUTPATIENT)
Dept: PHYSICAL THERAPY | Facility: MEDICAL CENTER | Age: 36
End: 2021-02-01
Attending: FAMILY MEDICINE
Payer: COMMERCIAL

## 2021-02-01 DIAGNOSIS — M25.559 LATERAL PAIN OF HIP: ICD-10-CM

## 2021-02-01 PROCEDURE — 97161 PT EVAL LOW COMPLEX 20 MIN: CPT

## 2021-02-01 PROCEDURE — 97012 MECHANICAL TRACTION THERAPY: CPT

## 2021-02-01 PROCEDURE — 97110 THERAPEUTIC EXERCISES: CPT

## 2021-02-01 ASSESSMENT — ENCOUNTER SYMPTOMS
PAIN SCALE: 4
EXACERBATED BY: SQUATTING
QUALITY: ACHING
EXACERBATED BY: ACTIVITY
PAIN SCALE AT LOWEST: 4
PAIN TIMING: WHEN ACTIVE
EXACERBATED BY: RUNNING
QUALITY: BURNING
PAIN SCALE AT HIGHEST: 7
PAIN TIMING: INTERMITTENT

## 2021-02-01 NOTE — OP THERAPY EVALUATION
Outpatient Physical Therapy  INITIAL EVALUATION    [facility] Outpatient Physical Therapy  [address]  [place] [address]  Phone:  [phone]  Fax:  [phone]    Date of Evaluation: 2021    Patient: Magaly Lynch  YOB: 1985  MRN: [id number]     Referring Provider: [address] [address] [address]   Referring Diagnosis Lateral pain of hip [M25.559]     Time Calculation    Start time: 0830  Stop time: 0930 Time Calculation (min): 60 minutes         Chief Complaint: No chief complaint on file.    Visit Diagnoses     ICD-10-CM   1. Lateral pain of hip  M25.559         Subjective:   History of Present Illness:     Mechanism of injury:  Patient is a 35 year old female previously known to this therapist who presents with complaints of right hip pain that began after she began jogging in the fall. Patient was previously seen for her right knee for a meniscus tear. After running for 6 weeks she was reporting right hip pain. She tried new shoes, stretching and continues to have pain. Patient saw Dr. Renner who was considering an injection, but patient reports she is allergic to it. Patient was running 2.5 miles, 3 x per week.    Patient reports the last day she ran was on 2021. Patient reports she was also having pain with walking and running.    Pain:     Current pain ratin    At best pain ratin    At worst pain ratin    Quality:  Burning and aching    Pain timing:  Intermittent and when active    Alleviating factors: lying flat on her back or on her stomach.    Aggravating factors:  Activity, running and squatting  Social Support:     Lives in:  Multiple-level home    Lives with:  Significant other and young children  Hand dominance:  Left  Diagnostic Tests:     X-ray: abnormal      Diagnostic Tests Comments:  FINDINGS:  There is no acute fracture or malalignment of either hip joint.     There is a tiny  ossific density projecting adjacent to the superior-lateral right and left acetabulum likely representing small os acetabuli.     There is no evidence of a femoral head neck bump.     The SI joints are normal. Pubic symphysis is normal.     There are a few incidental left hemipelvic phlebolith.     IMPRESSION:     1.  There are bilateral os acetabuli. These can be associated with acetabular impingement-type syndromes.  2.  Exam is otherwise normal.    L/S x ray   FINDINGS:  The alignment of the lumbar spine is within normal limits.     The vertebral body heights and disc spaces are normal.     There is no arthropathy identified.     SI joints are normal. Visualized pelvic bones are unremarkable.     IMPRESSION:     1.  Normal lumbar spine series.  Activities of Daily Living:     Patient reported ADL status: Enjoys being active. Works from home and has a significant other and a 5 year old son.   Patient Goals:     Patient goals for therapy:  Decreased pain and return to sport/leisure activities      Past Medical History:   Diagnosis Date   • Crohn's disease (HCC) 20111   • Hip pain, bilateral    • Kidney stone 2008 to 2015    4x (mostly on right side).    • Migraines 1998   • MRSA infection     sinus infection   • Ovarian cyst     age 18, burst     Past Surgical History:   Procedure Laterality Date   • DENTAL EXTRACTION(S)       Social History     Tobacco Use   • Smoking status: Never Smoker   • Smokeless tobacco: Never Used   Substance Use Topics   • Alcohol use: Yes     Comment: once a week     Family and Occupational History     Socioeconomic History   • Marital status: Single     Spouse name: Not on file   • Number of children: Not on file   • Years of education: Not on file   • Highest education level: Not on file   Occupational History   • Occupation: pharmceutical      Comment: Prior job       Objective     Lumbar Screen  Lumbar range of motion within normal limits.    Neurological Testing     Reflexes    Left   Patellar (L4): normal (2+)  Achilles (S1): normal (2+)    Right   Patellar (L4): normal (2+)  Achilles (S1): normal (2+)    Myotome testing   Lumbar (left)   All left lumbar myotomes within normal limits    Lumbar (right)   All right lumbar myotomes within normal limits    Dermatome testing   Lumbar (left)   All left lumbar dermatomes intact    Lumbar (right)   All right lumbar dermatomes intact    Active Range of Motion     Lumbar   All lumbar active range of motion within functional limits    Additional Active Range of Motion Details  Pain with right side bend and pain with flexion  Quadrant test reproduces symptoms.     Joint Play   Spine     Central PA [place]        L4: painful and hypomobile       L5: hypomobile and painful    Unilateral PA Glide (right)        L5: hypomobile and painful        Tests       Lumbar spine (right)     Positive slump.     Right Hip   SLR: Positive.     Additional Tests Details  SLR reproduces symptoms in low back   - LUIS and - FADIR   - scour   Mild right hip IR limitations in ROM, but no pain noted in hip  - pain over greater trochanter              Therapeutic Exercises (CPT 20073):     1. Standing extension     2. Prone press ups     7. UPOC due 3/29/21      Therapeutic Exercise Summary: Relief with manual traction     Therapeutic Treatments and Modalities:     1. Mechanical Traction (CPT 45861), 80/50#  60/20 sec with heat x 1 5min     Time-based treatments/modalities:           Assessment, Response and Plan:   Impairments: activity intolerance, impaired physical strength, lacks appropriate home exercise program and pain with function    Assessment details:  Patient is a 35 year old female previously known to this therapist for treating knee pain who presents with complaints of right hip pain. Xray does show a right hip acetabular impingement, however, patient demonstrates symptoms consistent with right L5 referred pain. Patient presents with L/S derangement  syndrome  with  poor posture  and poor body awareness with decreased lordosis.   Neurological exam is WNL.  Patient presents with axial load and flexion sensitivities. Patient centralized with Ami extension protocol. Patient should do well if compliant with POC.  Prognosis: good    Goals:   Short Term Goals:   1.  Patient will be independent in HEP with written instructions.   Short term goal time span:  2-4 weeks      Long Term Goals:    1.Patient will score <15 on the WOMAC.   2. Patient will be able to walk for at least 45 minutes without being limited by pain.   3. Patient will be able to return to jogging for at least 30 minutes without being limited by pain.     4. Patient will be independent in HEP with written instructions including pain management techniques.   Long term goal time span:  6-8 weeks    Plan:   Therapy options:  Physical therapy treatment to continue  Planned therapy interventions:  Neuromuscular Re-education (CPT 07289), E Stim Unattended (CPT 90271), Mechanical Traction (CPT 53179), Manual Therapy (CPT 64985), Therapeutic Exercise (CPT 31794) and Therapeutic Activities (CPT 26376)  Frequency:  2x week  Duration in weeks:  8  Discussed with:  Patient      Functional Assessment Used  WOMAC Grand Total: 36.46     Referring provider co-signature:  I have reviewed this plan of care and my co-signature certifies the need for services.    Certification Period: 02/01/2021 to  03/29/21    Physician Signature: ________________________________ Date: ______________

## 2021-02-04 ENCOUNTER — PHYSICAL THERAPY (OUTPATIENT)
Dept: PHYSICAL THERAPY | Facility: MEDICAL CENTER | Age: 36
End: 2021-02-04
Attending: FAMILY MEDICINE
Payer: COMMERCIAL

## 2021-02-04 DIAGNOSIS — M25.559 LATERAL PAIN OF HIP: ICD-10-CM

## 2021-02-04 PROCEDURE — 97110 THERAPEUTIC EXERCISES: CPT

## 2021-02-04 PROCEDURE — 97012 MECHANICAL TRACTION THERAPY: CPT

## 2021-02-04 NOTE — OP THERAPY DAILY TREATMENT
Outpatient Physical Therapy  DAILY TREATMENT     [facility] Outpatient Physical Therapy  [address]  [place] [address]  Phone:  [phone]  Fax:  [phone]    Date: 02/04/2021    Patient: Magaly Lynch  YOB: 1985  MRN: [id number]     Time Calculation    Start time: 0830  Stop time: 0917 Time Calculation (min): 47 minutes         Chief Complaint: No chief complaint on file.    Visit #: 2    SUBJECTIVE:  I had to carry my desk and it may not have been the best idea.      OBJECTIVE:  Current objective measures:           Therapeutic Exercises (CPT 47042):     1. Standing extension     2. Prone press ups     3. Bent knee fall outs , with cuff     4. Supine marching     7. UPOC due 3/29/21      Therapeutic Exercise Summary: Access Code: [email or web address]  URL: [email or web address]  Date: 02/04/2021  Prepared by: Tammie Torres    Exercises  Bent Knee Fallouts - 10 reps - 3 sets - 1x daily - 7x weekly  Supine March - 10 reps - 3 sets - 1x daily - 7x weekly  Bridge with Heels on Swiss Ball - 10 reps - 2 sets - 1x daily - 7x weekly    Therapeutic Treatments and Modalities:     1. Mechanical Traction (CPT 24237), 80/50#  60/20 sec with heat x 1 5min     2. Manual Therapy (CPT 41922), PA L2-L5 Gr 2-3, rotation and right LE distraction     Time-based treatments/modalities:    Physical Therapy Timed Treatment Charges  Therapeutic exercise minutes (CPT 27386): 28 minutes    ASSESSMENT:   Response to treatment:   Patient reports decreased hip pain and more back pain which may indicate lumbar spine etiology versus hip.       PLAN/RECOMMENDATIONS:   Plan for treatment: therapy treatment to continue next visit.  Planned interventions for next visit: continue with current treatment.

## 2021-02-09 ENCOUNTER — PHYSICAL THERAPY (OUTPATIENT)
Dept: PHYSICAL THERAPY | Facility: MEDICAL CENTER | Age: 36
End: 2021-02-09
Attending: FAMILY MEDICINE
Payer: COMMERCIAL

## 2021-02-09 DIAGNOSIS — M25.559 LATERAL PAIN OF HIP: ICD-10-CM

## 2021-02-09 PROCEDURE — 97110 THERAPEUTIC EXERCISES: CPT

## 2021-02-09 PROCEDURE — 97012 MECHANICAL TRACTION THERAPY: CPT

## 2021-02-09 NOTE — OP THERAPY DAILY TREATMENT
Outpatient Physical Therapy  DAILY TREATMENT     [facility] Outpatient Physical Therapy  [address]  [place] [address]  Phone:  [phone]  Fax:  [phone]    Date: 02/09/2021    Patient: Magaly Lynch  YOB: 1985  MRN: [id number]     Time Calculation    Start time: 0930  Stop time: 1020 Time Calculation (min): 50 minutes         Chief Complaint: No chief complaint on file.    Visit #: 3    SUBJECTIVE:  I did some digging in my back yard. I also put together a raised bed in my back yard and I lifted 25# blocks and I was in a lot of pain. The exercises seem to be helping when I am in pain.     OBJECTIVE:  Current objective measures:           Therapeutic Exercises (CPT 92414):     1. Standing extension     2. Prone press ups , 3 x 10     3. Bent knee fall outs , with cuff     4. Supine marching , reviewed for HEP     5. 90/90, with and without cuff     6. Supermans , 30 sec x 6     7. Ball wall squat, 20x    11. UPOC due 3/29/21      Therapeutic Exercise Summary: Access Code: [email or web address]  URL: [email or web address]  Date: 02/04/2021  Prepared by: Tammie Torres    Exercises  Bent Knee Fallouts - 10 reps - 3 sets - 1x daily - 7x weekly  Supine March - 10 reps - 3 sets - 1x daily - 7x weekly  Bridge with Heels on Swiss Ball - 10 reps - 2 sets - 1x daily - 7x weekly    Therapeutic Treatments and Modalities:     1. Mechanical Traction (CPT 05853), 75/50#  60/20 sec with heat x 1 5min     Time-based treatments/modalities:    Physical Therapy Timed Treatment Charges  Therapeutic exercise minutes (CPT 20616): 30 minutes        ASSESSMENT:   Response to treatment: Patient centralizes well with extension. Poor lumbar hip dissociation.    PLAN/RECOMMENDATIONS:   Plan for treatment: therapy treatment to continue next visit.  Planned interventions for next visit: continue with current treatment.

## 2021-02-11 ENCOUNTER — APPOINTMENT (OUTPATIENT)
Dept: PHYSICAL THERAPY | Facility: MEDICAL CENTER | Age: 36
End: 2021-02-11
Attending: FAMILY MEDICINE
Payer: COMMERCIAL

## 2021-02-15 ENCOUNTER — PHYSICAL THERAPY (OUTPATIENT)
Dept: PHYSICAL THERAPY | Facility: MEDICAL CENTER | Age: 36
End: 2021-02-15
Attending: FAMILY MEDICINE
Payer: COMMERCIAL

## 2021-02-15 DIAGNOSIS — M25.559 LATERAL PAIN OF HIP: ICD-10-CM

## 2021-02-15 PROCEDURE — 97110 THERAPEUTIC EXERCISES: CPT

## 2021-02-15 PROCEDURE — 97012 MECHANICAL TRACTION THERAPY: CPT

## 2021-02-15 NOTE — OP THERAPY DAILY TREATMENT
Outpatient Physical Therapy  DAILY TREATMENT     [facility] Outpatient Physical Therapy  [address]  [place] [address]  Phone:  [phone]  Fax:  [phone]    Date: 02/15/2021    Patient: Magaly Lynch  YOB: 1985  MRN: [id number]     Time Calculation    Start time: 0830  Stop time: 0925 Time Calculation (min): 55 minutes         Chief Complaint: No chief complaint on file.    Visit #: 4    SUBJECTIVE:  I was okay except for the 15 minute walk. I was really sore afterwards and took ibuprofen and lay on the heating pad.   OBJECTIVE:  Current objective measures:         Therapeutic Exercises (CPT 11591):     1. Standing extension     2. Prone press ups , 3 x 10     3. Bent knee fall outs , with cuff     4. Supine marching , reviewed for HEP     5. 90/90, with and without cuff     6. Supermans , 30 sec x 6     7. Ball wall squat, 20x    11. UPOC due 3/29/21      Therapeutic Exercise Summary: Ambulation on treadmill with use of repeated lumbar extensions prior to walking. With l/s spine extension - cues for not doing t/s extension or upper l/s extension, patient is able to walk for >15 min with extension breaks on the treadmill. Educated patients to return to walking for 10 min Day 1 with repeated extension as a warm up and use as needed. To progress 5 min per day if pain is managed. Patient verbalizes good understanding of recommendations.         Access Code: [email or web address]  URL: [email or web address]  Date: 02/04/2021  Prepared by: Tammie Torres    Exercises  Bent Knee Fallouts - 10 reps - 3 sets - 1x daily - 7x weekly  Supine March - 10 reps - 3 sets - 1x daily - 7x weekly  Bridge with Heels on Swiss Ball - 10 reps - 2 sets - 1x daily - 7x weekly    Therapeutic Treatments and Modalities:     1. Mechanical Traction (CPT 56002), 75/50#  60/20 sec with heat x 1 5min           Time-based treatments/modalities:    Physical Therapy Timed Treatment  Charges  Therapeutic exercise minutes (CPT 50869): 35 minutes      ASSESSMENT:   Response to treatment: Patient is progressing well, she is able to centralize symptoms with repeated extension, but demonstrates poor lower lumbar extension and requires cues to move from that region. Beginning to progress return to walking for exercise.     PLAN/RECOMMENDATIONS:   Plan for treatment: therapy treatment to continue next visit.    Planned interventions for next visit: continue with current treatment.

## 2021-02-22 ENCOUNTER — OFFICE VISIT (OUTPATIENT)
Dept: MEDICAL GROUP | Facility: CLINIC | Age: 36
End: 2021-02-22
Payer: COMMERCIAL

## 2021-02-22 VITALS
RESPIRATION RATE: 16 BRPM | BODY MASS INDEX: 28.68 KG/M2 | DIASTOLIC BLOOD PRESSURE: 78 MMHG | HEART RATE: 74 BPM | TEMPERATURE: 98.2 F | WEIGHT: 168 LBS | OXYGEN SATURATION: 97 % | HEIGHT: 64 IN | SYSTOLIC BLOOD PRESSURE: 118 MMHG

## 2021-02-22 DIAGNOSIS — M76.01 GLUTEAL TENDINITIS, RIGHT HIP: ICD-10-CM

## 2021-02-22 DIAGNOSIS — M70.61 TROCHANTERIC BURSITIS, RIGHT HIP: ICD-10-CM

## 2021-02-22 PROCEDURE — 99213 OFFICE O/P EST LOW 20 MIN: CPT | Performed by: FAMILY MEDICINE

## 2021-02-22 ASSESSMENT — FIBROSIS 4 INDEX: FIB4 SCORE: .4948716593053935123

## 2021-02-22 NOTE — PROGRESS NOTES
"Subjective:      Magaly Lynch is a 35 y.o. female who presents with Hip Pain (Referral from PCP/ R hip pain )     Referred by Jasmin Palencia M.D.  for evaluation of RIGHT hip pain    HPI   RIGHT hip pain  Started jogging in October  Mid November she started experiencing lateral hip pain in the RIGHT side  In the past 2 weeks she has been having pain in the RIGHT lower back  Is a burning pain in the ribs her RIGHT hip and also pain at the RIGHT SI joint  Improved with lying flat or prone  She has been doing some IT band stretching which has helped her lateral RIGHT thigh pain, but she still has some lumbar spine issues and her thigh pain persist with certain activities.  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, mostly desk work  Likes yoga, Pilates, jogging/hiking and walking     Objective:     /78 (BP Location: Left arm, Patient Position: Sitting, BP Cuff Size: Adult)   Pulse 74   Temp 36.8 °C (98.2 °F) (Temporal)   Resp 16   Ht 1.626 m (5' 4\")   Wt 76.2 kg (168 lb)   SpO2 97%   BMI 28.84 kg/m²       Physical Exam     HIP EXAM:  NORMAL gait    Right hip: Range of motion is intact  NEGATIVE pain with internal rotation  kevin's test is POSITIVE  POSITIVE tenderness of the trochanteric bursa  POSITIVE tenderness of the gluteus medius  Sofía's test is POSITIVE  POSITIVE SI tenderness on the RIGHT    Left hip: Range of motion is intact  NEGATIVE " pain with internal rotation  kevin's test is NEGATIVE  NO tenderness of the trochanteric bursa  NO tenderness of the gluteus medius  Sofía's test is NEGATIVE    Lumbar spine exam:  No acute distress  Able to walk on heels and toes  Able to flex to 90° without discomfort  Extension and lateral rotation produces discomfort particularly to the RIGHT side  Strength testing with hip flexion, knee flexion and extension, ankle dorsiflexion and plantarflexion, and EHL testing were 5 out of 5 bilaterally     Assessment/Plan:      1. Trochanteric bursitis, right hip     2. Gluteal tendinitis, right hip       SI belt  ITB stretch   ALLERGIC to steroid shot    She is progressing well with formal physical therapy  She has discontinued SI belt since it was NOT helping  Currently she is holding off on Pilates/yoga and is continuing to do her formal physical therapy with gradual but continued improvement    She is also pending physical therapy which was ordered by PCP    Return in about 6 weeks (around 4/5/2021).  To see how she is doing  Hopefully this will be her last visit    Thank you Jasmin Palencia M.D. for allowing me to participate in caring for your patient.

## 2021-02-23 ENCOUNTER — PHYSICAL THERAPY (OUTPATIENT)
Dept: PHYSICAL THERAPY | Facility: MEDICAL CENTER | Age: 36
End: 2021-02-23
Attending: FAMILY MEDICINE
Payer: COMMERCIAL

## 2021-02-23 DIAGNOSIS — M25.559 LATERAL PAIN OF HIP: ICD-10-CM

## 2021-02-23 PROCEDURE — 97014 ELECTRIC STIMULATION THERAPY: CPT

## 2021-02-23 PROCEDURE — 97140 MANUAL THERAPY 1/> REGIONS: CPT

## 2021-02-23 NOTE — OP THERAPY DAILY TREATMENT
Outpatient Physical Therapy  DAILY TREATMENT     [facility] Outpatient Physical Therapy  [address]  [place] [address]  Phone:  [phone]  Fax:  [phone]    Date: 02/23/2021    Patient: Magaly Lynch  YOB: 1985  MRN: [id number]     Time Calculation    Start time: 0935  Stop time: 1020 Time Calculation (min): 45 minutes         Chief Complaint: No chief complaint on file.    Visit #: 5    SUBJECTIVE:  I saw Dr. Renner yesterday and thinks that the IT band is still an issue.  I have been doing 10  Min walks with 2 minute breaks. I think it is better, but it is still an issue. I am really stiff in the am and by the end of the day it is pretty sore again.      OBJECTIVE:  Current objective measures:           Therapeutic Exercises (CPT 47503):     1. Standing extension     2. Prone press ups , 3 x 10     3. Bent knee fall outs , with cuff     4. Supine marching , reviewed for HEP     5. 90/90, with and without cuff     6. Supermans , 30 sec x 6     7. Ball wall squat, 20x    11. UPOC due 3/29/21      Therapeutic Exercise Summary: Patient is doing 12 minutes of walking for         Access Code: [email or web address]  URL: [email or web address]  Date: 02/04/2021  Prepared by: Tammie Torres    Exercises  Bent Knee Fallouts - 10 reps - 3 sets - 1x daily - 7x weekly  Supine March - 10 reps - 3 sets - 1x daily - 7x weekly  Bridge with Heels on Swiss Ball - 10 reps - 2 sets - 1x daily - 7x weekly    Therapeutic Treatments and Modalities:     1. E Stim Unattended (CPT 31232), IFC and heat x 15 min     2. Manual Therapy (CPT 32001), PA unilateral transverse PA Gr 2-3 , cupping to right lateral thigh with distraction     Time-based treatments/modalities:    Physical Therapy Timed Treatment Charges  Manual therapy minutes (CPT 56564): 25 minutes        ASSESSMENT:   Response to treatment: Patient is doing well overall with decreased pain and is able to walk for increasing  distances.     PLAN/RECOMMENDATIONS:   Plan for treatment: therapy treatment to continue next visit.  Planned interventions for next visit: continue with current treatment.

## 2021-02-24 RX ORDER — ACETAMINOPHEN 325 MG/1
650 TABLET ORAL ONCE
Status: CANCELLED | OUTPATIENT
Start: 2021-02-24

## 2021-02-25 ENCOUNTER — PHYSICAL THERAPY (OUTPATIENT)
Dept: PHYSICAL THERAPY | Facility: MEDICAL CENTER | Age: 36
End: 2021-02-25
Attending: FAMILY MEDICINE
Payer: COMMERCIAL

## 2021-02-25 DIAGNOSIS — M25.559 LATERAL PAIN OF HIP: ICD-10-CM

## 2021-02-25 PROCEDURE — 97014 ELECTRIC STIMULATION THERAPY: CPT

## 2021-02-25 PROCEDURE — 97140 MANUAL THERAPY 1/> REGIONS: CPT

## 2021-02-25 NOTE — OP THERAPY DAILY TREATMENT
Outpatient Physical Therapy  DAILY TREATMENT     [facility] Outpatient Physical Therapy  [address]  [place] [address]  Phone:  [phone]  Fax:  [phone]    Date: 02/25/2021    Patient: Magaly Lynch  YOB: 1985  MRN: [id number]     Time Calculation    Start time: 0930  Stop time: 1018 Time Calculation (min): 48 minutes         Chief Complaint: No chief complaint on file.    Visit #: 6    SUBJECTIVE:  I am doing much better. I have not been waking up stiff the last couple of day.     OBJECTIVE:  Current objective measures:           Therapeutic Exercises (CPT 10526):     11. UPOC due 3/29/21      Therapeutic Exercise Summary: Patient is doing 12 minutes of walking for         Access Code: [email or web address]  URL: [email or web address]  Date: 02/04/2021  Prepared by: Tammie Torres    Exercises  Bent Knee Fallouts - 10 reps - 3 sets - 1x daily - 7x weekly  Supine March - 10 reps - 3 sets - 1x daily - 7x weekly  Bridge with Heels on Swiss Ball - 10 reps - 2 sets - 1x daily - 7x weekly    Therapeutic Treatments and Modalities:     1. E Stim Unattended (CPT 40835), IFC and heat x 15 min     2. Manual Therapy (CPT 56984), PA unilateral transverse PA Gr 2-3 , cupping to right lateral thigh with distraction , IASTM to l/s     Time-based treatments/modalities:         ASSESSMENT:   Response to treatment:   Patient reported decreased pain with the manual treatment. Patient reported relief with unilateral PA.      PLAN/RECOMMENDATIONS:   Plan for treatment: therapy treatment to continue next visit.  Planned interventions for next visit: continue with current treatment.

## 2021-03-01 ENCOUNTER — PHYSICAL THERAPY (OUTPATIENT)
Dept: PHYSICAL THERAPY | Facility: MEDICAL CENTER | Age: 36
End: 2021-03-01
Attending: FAMILY MEDICINE
Payer: COMMERCIAL

## 2021-03-01 DIAGNOSIS — M25.559 LATERAL PAIN OF HIP: ICD-10-CM

## 2021-03-01 PROCEDURE — 97140 MANUAL THERAPY 1/> REGIONS: CPT

## 2021-03-01 PROCEDURE — 97014 ELECTRIC STIMULATION THERAPY: CPT

## 2021-03-01 PROCEDURE — 97110 THERAPEUTIC EXERCISES: CPT

## 2021-03-03 ENCOUNTER — PHYSICAL THERAPY (OUTPATIENT)
Dept: PHYSICAL THERAPY | Facility: MEDICAL CENTER | Age: 36
End: 2021-03-03
Attending: FAMILY MEDICINE
Payer: COMMERCIAL

## 2021-03-03 DIAGNOSIS — M25.559 LATERAL PAIN OF HIP: ICD-10-CM

## 2021-03-03 PROCEDURE — 97140 MANUAL THERAPY 1/> REGIONS: CPT

## 2021-03-03 PROCEDURE — 97014 ELECTRIC STIMULATION THERAPY: CPT

## 2021-03-03 NOTE — OP THERAPY DAILY TREATMENT
Outpatient Physical Therapy  DAILY TREATMENT     [facility] Outpatient Physical Therapy  [address]  [place] [address]  Phone:  [phone]  Fax:  [phone]    Date: 03/03/2021    Patient: Magaly Lynch  YOB: 1985  MRN: [id number]     Time Calculation    Start time: 0930  Stop time: 1020 Time Calculation (min): 50 minutes         Chief Complaint: No chief complaint on file.    Visit #: 8    SUBJECTIVE:  I think that the pain is moving out of my back and into my hip.It is definitely hurting way less.      OBJECTIVE:  Current objective measures:       Therapeutic Exercises (CPT 48907):     1. Patient agrees to risks and benefits associated with Dry Needling, Written consent signed     2. Patients skin cleaned and prepped according to protocol    3. L3-L5 paravertebral    4. Glut med and min    6. Electrical stimulation applied to needles x  10     minutes     7. Heat applied to area x 10 minutes     8. No adverse effects noted post treatment     11. UPOC due 3/29/21      Therapeutic Exercise Summary: Access Code: [email or web address]  URL: [email or web address]  Date: 03/01/2021  Prepared by: Tammie Torres    Exercises  Supine 90/90 Alternating Toe Touch - 10 reps - 3 sets - 1x daily - 7x weekly  Quadruped Alternating Leg Extensions - 10 reps - 3 sets - 1x daily - 7x weekly  Quadruped Fire Hydrant - 10 reps - 3 sets - 1x daily - 7x weekly    Therapeutic Treatments and Modalities:     1. E Stim Unattended (CPT 25972), IFC and heat x 15 min     2. Manual Therapy (CPT 97239), DN    Time-based treatments/modalities:    Physical Therapy Timed Treatment Charges  Manual therapy minutes (CPT 64079): 27 minutes      ASSESSMENT:   Response to treatment: Patient reports overall improvement and improved activity tolerance.         PLAN/RECOMMENDATIONS:   Plan for treatment: therapy treatment to continue next visit.  Planned interventions for next visit: continue with current  treatment.

## 2021-03-10 ENCOUNTER — PHYSICAL THERAPY (OUTPATIENT)
Dept: PHYSICAL THERAPY | Facility: MEDICAL CENTER | Age: 36
End: 2021-03-10
Attending: FAMILY MEDICINE
Payer: COMMERCIAL

## 2021-03-10 DIAGNOSIS — M25.559 LATERAL PAIN OF HIP: ICD-10-CM

## 2021-03-10 DIAGNOSIS — M25.561 ACUTE PAIN OF RIGHT KNEE: ICD-10-CM

## 2021-03-10 PROCEDURE — 97014 ELECTRIC STIMULATION THERAPY: CPT

## 2021-03-10 PROCEDURE — 97140 MANUAL THERAPY 1/> REGIONS: CPT

## 2021-03-10 NOTE — OP THERAPY DAILY TREATMENT
Outpatient Physical Therapy  DAILY TREATMENT     [facility] Outpatient Physical Therapy  [address]  [place] [address]  Phone:  [phone]  Fax:  [phone]    Date: 03/10/2021    Patient: Magaly Lynch  YOB: 1985  MRN: [id number]     Time Calculation    Start time: 0830  Stop time: 0930 Time Calculation (min): 60 minutes         Chief Complaint: No chief complaint on file.    Visit #: 9    SUBJECTIVE:  I felt wacky after the dry needling, but then I had 0 pain after this. I also got rid of this terrible chair.  I am doing really well. I have a 60 - 70% improvement. I rode my bike over the weekend and I am doing great.     OBJECTIVE:  Current objective measures:           Therapeutic Exercises (CPT 62675):     1. Patient agrees to risks and benefits associated with Dry Needling, Written consent signed     2. Patients skin cleaned and prepped according to protocol    3. L3-L5 paravertebral    4. Glut med and min    6. Electrical stimulation applied to needles x  10     minutes     7. Heat applied to area x 10 minutes     8. No adverse effects noted post treatment     11. UPOC due 3/29/21      Therapeutic Exercise Summary: Access Code: [email or web address]  URL: [email or web address]  Date: 03/01/2021  Prepared by: Tammie Torres    Exercises  Supine 90/90 Alternating Toe Touch - 10 reps - 3 sets - 1x daily - 7x weekly  Quadruped Alternating Leg Extensions - 10 reps - 3 sets - 1x daily - 7x weekly  Quadruped Fire Hydrant - 10 reps - 3 sets - 1x daily - 7x weekly    Therapeutic Treatments and Modalities:     1. E Stim Unattended (CPT 14260), IFC and heat x 15 min     2. Manual Therapy (CPT 00737), DN    Time-based treatments/modalities:    Physical Therapy Timed Treatment Charges  Manual therapy minutes (CPT 34454): 27 minutes    ASSESSMENT:   Response to treatment: Patient reported she is doing well overall with increased motion and decreased overall pain.        PLAN/RECOMMENDATIONS:   Plan for treatment: therapy treatment to continue next visit.  Planned interventions for next visit: continue with current treatment.

## 2021-03-16 ENCOUNTER — APPOINTMENT (OUTPATIENT)
Dept: PHYSICAL THERAPY | Facility: MEDICAL CENTER | Age: 36
End: 2021-03-16
Attending: FAMILY MEDICINE
Payer: COMMERCIAL

## 2021-03-18 ENCOUNTER — PHYSICAL THERAPY (OUTPATIENT)
Dept: PHYSICAL THERAPY | Facility: MEDICAL CENTER | Age: 36
End: 2021-03-18
Attending: FAMILY MEDICINE
Payer: COMMERCIAL

## 2021-03-18 DIAGNOSIS — M25.559 LATERAL PAIN OF HIP: ICD-10-CM

## 2021-03-18 PROCEDURE — 97140 MANUAL THERAPY 1/> REGIONS: CPT

## 2021-03-18 PROCEDURE — 97014 ELECTRIC STIMULATION THERAPY: CPT

## 2021-03-18 NOTE — OP THERAPY DAILY TREATMENT
Outpatient Physical Therapy  DAILY TREATMENT     [facility] Outpatient Physical Therapy  [address]  [place] [address]  Phone:  [phone]  Fax:  [phone]    Date: 03/18/2021    Patient: Magaly Lynch  YOB: 1985  MRN: [id number]     Time Calculation    Start time: 0830  Stop time: 0917 Time Calculation (min): 47 minutes         Chief Complaint: No chief complaint on file.    Visit #: 10    SUBJECTIVE:  I had an infusion last Friday and that is what probably what caused the migraine. On the plus side, my back feel great.  I even did some low impact yoga and it has been fine. I am only standing at work, i have thrown out my old office chair.      OBJECTIVE:    Therapeutic Exercises (CPT 87243):     1. Patient agrees to risks and benefits associated with Dry Needling, Written consent signed     2. Patients skin cleaned and prepped according to protocol    3. L3-L5 paravertebral    4. Glut med and min    6. Electrical stimulation applied to needles x  10     minutes     7. Heat applied to area x 10 minutes     8. No adverse effects noted post treatment     11. UPOC due 3/29/21      Therapeutic Exercise Summary: Access Code: [email or web address]  URL: [email or web address]  Date: 03/01/2021  Prepared by: Tammie Torres    Exercises  Supine 90/90 Alternating Toe Touch - 10 reps - 3 sets - 1x daily - 7x weekly  Quadruped Alternating Leg Extensions - 10 reps - 3 sets - 1x daily - 7x weekly  Quadruped Fire Hydrant - 10 reps - 3 sets - 1x daily - 7x weekly    Therapeutic Treatments and Modalities:     1. E Stim Unattended (CPT 77477), IFC and heat x 15 min     2. Manual Therapy (CPT 95569), DN    Time-based treatments/modalities:    Physical Therapy Timed Treatment Charges  Manual therapy minutes (CPT 43022): 25 minutes      ASSESSMENT:   Response to treatment: Progess ther ex when therapist is on vacation.  Add continued lumbar stabilization and hip strength.      PLAN/RECOMMENDATIONS:   Plan for treatment: therapy treatment to continue next visit.  Planned interventions for next visit: continue with current treatment.

## 2021-03-24 ENCOUNTER — PHYSICAL THERAPY (OUTPATIENT)
Dept: PHYSICAL THERAPY | Facility: MEDICAL CENTER | Age: 36
End: 2021-03-24
Attending: FAMILY MEDICINE
Payer: COMMERCIAL

## 2021-03-24 DIAGNOSIS — M25.559 LATERAL PAIN OF HIP: ICD-10-CM

## 2021-03-24 PROCEDURE — 97014 ELECTRIC STIMULATION THERAPY: CPT

## 2021-03-24 PROCEDURE — 97140 MANUAL THERAPY 1/> REGIONS: CPT

## 2021-03-24 PROCEDURE — 97110 THERAPEUTIC EXERCISES: CPT

## 2021-03-24 NOTE — OP THERAPY DAILY TREATMENT
"  Outpatient Physical Therapy  DAILY TREATMENT     [facility] Outpatient Physical Therapy  [address]  [place] [address]  Phone:  [phone]  Fax:  [phone]    Date: 03/24/2021    Patient: Magaly Lynch  YOB: 1985  MRN: [id number]     Time Calculation    Start time: 0900  Stop time: 0950 Time Calculation (min): 50 minutes         Chief Complaint: Back Pain and Hip Pain    Visit #: 11    SUBJECTIVE:  Felt a big improvement after 2nd needling session. Over the weekend did a\"ring\" workout program on the [employer] Switch that involved a bit of jogging in place and squats. Immediately felt increase in R L/S pressure that wrapped around to the front. Better today, but still aches.    OBJECTIVE:    Therapeutic Exercises (CPT 99892):     1. 90/90 hip lift, 3 breaths, 3 sets    2. 90/90 hip lift w/R LE ant shift, 3 breaths, 3 sets    3. Segmental bridge w/ball add    4. Liam-bridge w/ball under sacrum +marching, x3 ea    5. Hooklying TrA isometrics +march, heel slide, and SKFO    11. UPOC due 3/29/21      Therapeutic Exercise Summary: Access Code: [email or web address]      Therapeutic Treatments and Modalities:     1. E Stim Unattended (CPT 96351), IFC and heat x 15 min     2. Manual Therapy (CPT 97578), LE manual fascial stretching: deep front, post and spiral nets    Time-based treatments/modalities:    Physical Therapy Timed Treatment Charges  Manual therapy minutes (CPT 42123): 10 minutes  Therapeutic exercise minutes (CPT 74285): 20 minutes      ASSESSMENT:   Response to treatment: Pt presented w/ant/inf L ASIS and +L add drop test indicating pelvic rotation and possible hip impingement. Presents w/signficant L glut/HS weakness. Previous knee and Crohn's issues likely contributed.    PLAN/RECOMMENDATIONS:   Plan for treatment: therapy treatment to continue next visit.  Planned interventions for next visit: continue with current treatment.       "

## 2021-03-30 ENCOUNTER — PHYSICAL THERAPY (OUTPATIENT)
Dept: PHYSICAL THERAPY | Facility: MEDICAL CENTER | Age: 36
End: 2021-03-30
Attending: FAMILY MEDICINE
Payer: COMMERCIAL

## 2021-03-30 DIAGNOSIS — M25.561 ACUTE PAIN OF RIGHT KNEE: ICD-10-CM

## 2021-03-30 DIAGNOSIS — M25.559 LATERAL PAIN OF HIP: ICD-10-CM

## 2021-03-30 PROCEDURE — 97140 MANUAL THERAPY 1/> REGIONS: CPT

## 2021-03-30 PROCEDURE — 97014 ELECTRIC STIMULATION THERAPY: CPT

## 2021-03-30 NOTE — OP THERAPY DAILY TREATMENT
Outpatient Physical Therapy  DAILY TREATMENT     [facility] Outpatient Physical Therapy  [address]  [place] [address]  Phone:  [phone]  Fax:  [phone]    Date: 03/30/2021    Patient: Magaly Lynch  YOB: 1985  MRN: [id number]     Time Calculation    Start time: 0930  Stop time: 1020 Time Calculation (min): 50 minutes         Chief Complaint: No chief complaint on file.    Visit #: 12    SUBJECTIVE:  I feel like I am taking 2 steps forwards and then 1 step back. I do one thing and the I do squats and my whole side hurts. I am seeing Dr. Renner again on Monday.     OBJECTIVE:  Current objective measures:         Therapeutic Exercises (CPT 11511):     1. Patient agrees to risks and benefits associated with Dry Needling, Written consent signed     2. Patients skin cleaned and prepped according to protocol    3. L3-L5 paravertebral    4. Glut med and min    6. Electrical stimulation applied to needles x  10     minutes     7. Heat applied to area x 10 minutes     8. No adverse effects noted post treatment     11. UPOC due 3/29/21      Therapeutic Exercise Summary: Access Code: [email or web address]  URL: [email or web address]  Date: 03/01/2021  Prepared by: Tammie Torres    Exercises  Supine 90/90 Alternating Toe Touch - 10 reps - 3 sets - 1x daily - 7x weekly  Quadruped Alternating Leg Extensions - 10 reps - 3 sets - 1x daily - 7x weekly  Quadruped Fire Hydrant - 10 reps - 3 sets - 1x daily - 7x weekly    Therapeutic Treatments and Modalities:     1. E Stim Unattended (CPT 97459), IFC and heat x 15 min     2. Manual Therapy (CPT 27870), DN    Time-based treatments/modalities:    Physical Therapy Timed Treatment Charges  Manual therapy minutes (CPT 50468): 26 minutes          ASSESSMENT:   Response to treatment: Patient continues to have pain that is aggravated with squats and single leg activities.     PLAN/RECOMMENDATIONS:   Plan for treatment: therapy  treatment to continue next visit.  Planned interventions for next visit: continue with current treatment.

## 2021-04-01 ENCOUNTER — PHYSICAL THERAPY (OUTPATIENT)
Dept: PHYSICAL THERAPY | Facility: MEDICAL CENTER | Age: 36
End: 2021-04-01
Attending: FAMILY MEDICINE
Payer: COMMERCIAL

## 2021-04-01 DIAGNOSIS — M25.561 ACUTE PAIN OF RIGHT KNEE: ICD-10-CM

## 2021-04-01 DIAGNOSIS — M25.559 LATERAL PAIN OF HIP: ICD-10-CM

## 2021-04-01 PROCEDURE — 97014 ELECTRIC STIMULATION THERAPY: CPT

## 2021-04-01 PROCEDURE — 97140 MANUAL THERAPY 1/> REGIONS: CPT

## 2021-04-01 NOTE — OP THERAPY DAILY TREATMENT
Outpatient Physical Therapy  DAILY TREATMENT     [facility] Outpatient Physical Therapy  [address]  [place] [address]  Phone:  [phone]  Fax:  [phone]    Date: 04/01/2021    Patient: Magaly Lynch  YOB: 1985  MRN: [id number]     Time Calculation    Start time: 0930  Stop time: 1020 Time Calculation (min): 50 minutes         Chief Complaint: No chief complaint on file.    Visit #: 13    SUBJECTIVE:  I am super flared up right now. I think we do need more information. I tried to do my lumbar stabilization exercises, but I had to stop because of the pain.    OBJECTIVE:  Current objective measures:           Therapeutic Exercises (CPT 10352):     1. Patient agrees to risks and benefits associated with Dry Needling, Written consent signed     2. Patients skin cleaned and prepped according to protocol    3. L3-L5 paravertebral    6. Electrical stimulation applied to needles x  10     minutes     7. Heat applied to area x 10 minutes     8. No adverse effects noted post treatment     11. UPOC due 3/29/21      Therapeutic Exercise Summary: Access Code: [email or web address]  URL: [email or web address]  Date: 03/01/2021  Prepared by: Tammie Torres    Exercises  Supine 90/90 Alternating Toe Touch - 10 reps - 3 sets - 1x daily - 7x weekly  Quadruped Alternating Leg Extensions - 10 reps - 3 sets - 1x daily - 7x weekly  Quadruped Fire Hydrant - 10 reps - 3 sets - 1x daily - 7x weekly    Therapeutic Treatments and Modalities:     1. E Stim Unattended (CPT 02233), IFC and heat x 15 min     2. Manual Therapy (CPT 86552), DN    Time-based treatments/modalities:    Physical Therapy Timed Treatment Charges  Manual therapy minutes (CPT 14742): 25 minutes        ASSESSMENT:   Response to treatment:  Patient is currently having a flare up with pain at this time. Continued complaints of pain aggravated with any increase in weightbearing activity. Patient may benefit from further  imaging per MD recommendation.     PLAN/RECOMMENDATIONS:   Plan for treatment: Place on hold at this time.  Patient follows up with Dr. Renner next week.

## 2021-04-05 ENCOUNTER — OFFICE VISIT (OUTPATIENT)
Dept: MEDICAL GROUP | Facility: CLINIC | Age: 36
End: 2021-04-05
Payer: COMMERCIAL

## 2021-04-05 VITALS
HEIGHT: 64 IN | WEIGHT: 168 LBS | BODY MASS INDEX: 28.68 KG/M2 | DIASTOLIC BLOOD PRESSURE: 78 MMHG | TEMPERATURE: 98.1 F | RESPIRATION RATE: 16 BRPM | HEART RATE: 92 BPM | SYSTOLIC BLOOD PRESSURE: 118 MMHG | OXYGEN SATURATION: 98 %

## 2021-04-05 DIAGNOSIS — M70.61 TROCHANTERIC BURSITIS, RIGHT HIP: ICD-10-CM

## 2021-04-05 DIAGNOSIS — M76.01 GLUTEAL TENDINITIS, RIGHT HIP: ICD-10-CM

## 2021-04-05 DIAGNOSIS — M54.16 LUMBAR RADICULOPATHY, RIGHT: ICD-10-CM

## 2021-04-05 PROCEDURE — 99214 OFFICE O/P EST MOD 30 MIN: CPT | Performed by: FAMILY MEDICINE

## 2021-04-05 RX ORDER — GABAPENTIN 300 MG/1
300 CAPSULE ORAL NIGHTLY PRN
Qty: 60 CAPSULE | Refills: 0 | Status: SHIPPED | OUTPATIENT
Start: 2021-04-05 | End: 2021-04-22

## 2021-04-05 ASSESSMENT — FIBROSIS 4 INDEX: FIB4 SCORE: .4948716593053935123

## 2021-04-05 NOTE — Clinical Note
Jay Castellanos,  We saw Magaly back in follow-up again today.  She was doing well with formal physical therapy, but unfortunately she took a turn for the worse.  We have ordered an MRI study to evaluate the lumbar spine.  I also prescribed gabapentin to see if this helps with her night symptoms/leg pain.  We will see her back after the MRI to consider further management options.  Hope you are well!  L

## 2021-04-05 NOTE — PROGRESS NOTES
"Subjective:      Magaly Lynch is a 35 y.o. female who presents with Hip Pain (Referral from PCP/ R hip pain )     Referred by Jasmin Palencia M.D.  for evaluation of RIGHT hip pain    HPI   RIGHT hip pain  Started jogging in October  Mid November she started experiencing lateral hip pain in the RIGHT side  In the past 2 weeks she has been having pain in the RIGHT lower back  Is a burning pain in the ribs her RIGHT hip and also pain at the RIGHT SI joint  Improved with lying flat or prone  She has been doing some IT band stretching which has helped her lateral RIGHT thigh pain, but she still has some lumbar spine issues and her thigh pain persist with certain activities.  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, mostly desk work  Likes yoga, Pilates, jogging/hiking and walking     Objective:     /78 (BP Location: Left arm, Patient Position: Sitting, BP Cuff Size: Adult)   Pulse 92   Temp 36.7 °C (98.1 °F) (Temporal)   Resp 16   Ht 1.626 m (5' 4\")   Wt 76.2 kg (168 lb)   SpO2 98%   BMI 28.84 kg/m²     Physical Exam     HIP EXAM:  NORMAL gait  She does NOT have pain with internal rotation of the hips    Lumbar spine exam:  MILD distress  Able to walk on heels and toes  Able to flex to 90° without discomfort  Extension and lateral rotation produces discomfort particularly to the RIGHT side  Strength testing with hip flexion and knee extension, ankle dorsiflexion and plantarflexion, and EHL testing were 5 out of 5 " bilaterally  knee flexion is 4 out of 5 on the RIGHT compared to 5 out of 5 on the left  Slump test is POSITIVE on the RIGHT with reproducible radicular symptoms all the way down the leg to the RIGHT ankle     Assessment/Plan:      1. Lumbar radiculopathy, right  gabapentin (NEURONTIN) 300 MG Cap    MR-LUMBAR SPINE-W/O   2. Trochanteric bursitis, right hip     3. Gluteal tendinitis, right hip       SI belt  ITB stretch  ALLERGIC to steroid shot    1. She is now done over 2 months of formal physical therapy/home exercise program. She initially showed some improvement, but now she is feeling WORSE.  Her therapist is recommending advanced imaging.  2.  She now has new onset of RIGHT leg weakness and radicular symptoms down to her RIGHT foot  3.  She has POSITIVE weakness with hamstring testing on the RIGHT together with POSITIVE slump test, reproducible radicular symptoms down the RIGHT leg  4.  She has had x-ray imaging of both the lumbar spine and the hip which have been noncontributory  5.  Symptoms have been going on since October 2020  6.  She FAILED SI belt    Try gabapentin  Check MR Lumbar spine    Follow-up after MRI to discuss results and further management options  We will also prescribe some gabapentin to help calm down her leg pain      1/21/2021 12:58 PM     HISTORY/REASON FOR EXAM:  Back pain and right lateral hip pain        TECHNIQUE/ EXAM DESCRIPTION AND NUMBER OF VIEWS:  3 views of the lumbar spine.     COMPARISON: None.     FINDINGS:  The alignment of the lumbar spine is within normal limits.     The vertebral body heights and disc spaces are normal.     There is no arthropathy identified.     SI joints are normal. Visualized pelvic bones are unremarkable.     IMPRESSION:     1.  Normal lumbar spine series.          1/21/2021 12:57 PM     HISTORY/REASON FOR EXAM:  Right lateral hip pain.        TECHNIQUE/EXAM DESCRIPTION AND NUMBER OF VIEWS:  2 views of the RIGHT hip.     COMPARISON:  None     FINDINGS:  There is no acute fracture or malalignment of either hip joint.     There is a tiny ossific density projecting adjacent to the superior-lateral right and left acetabulum likely representing small os acetabuli.     There is no evidence of a femoral head neck bump.     The SI joints are normal. Pubic symphysis is normal.     There are a few incidental left hemipelvic phlebolith.     IMPRESSION:     1.  There are bilateral os acetabuli. These can be associated with acetabular impingement-type syndromes.  2.  Exam is otherwise normal.    Thank you Jasmin Palencia M.D. for allowing me to participate in caring for your patient.

## 2021-04-14 ENCOUNTER — APPOINTMENT (OUTPATIENT)
Dept: RADIOLOGY | Facility: MEDICAL CENTER | Age: 36
End: 2021-04-14
Attending: FAMILY MEDICINE
Payer: COMMERCIAL

## 2021-04-14 DIAGNOSIS — M54.16 LUMBAR RADICULOPATHY, RIGHT: ICD-10-CM

## 2021-04-14 PROCEDURE — 72148 MRI LUMBAR SPINE W/O DYE: CPT

## 2021-04-16 ENCOUNTER — OFFICE VISIT (OUTPATIENT)
Dept: MEDICAL GROUP | Facility: CLINIC | Age: 36
End: 2021-04-16
Payer: COMMERCIAL

## 2021-04-16 VITALS — BODY MASS INDEX: 28.68 KG/M2 | HEIGHT: 64 IN | WEIGHT: 168 LBS

## 2021-04-16 DIAGNOSIS — M51.26 LUMBAR DISC HERNIATION: ICD-10-CM

## 2021-04-16 DIAGNOSIS — M54.16 LUMBAR RADICULOPATHY, RIGHT: ICD-10-CM

## 2021-04-16 PROCEDURE — 99213 OFFICE O/P EST LOW 20 MIN: CPT | Performed by: FAMILY MEDICINE

## 2021-04-16 ASSESSMENT — FIBROSIS 4 INDEX: FIB4 SCORE: .4948716593053935123

## 2021-04-16 NOTE — PROGRESS NOTES
"Subjective:      Magaly Lynch is a 35 y.o. female who presents with Hip Pain (Referral from PCP/ R hip pain )     Referred by Jasmin Palencia M.D.  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 Lumbar radiculopathy, right           1. She is now done over 2 months of formal physical therapy/home exercise program. She initially showed some improvement, but now she is feeling WORSE.  Her therapist is recommending advanced imaging.  2.  She now has new onset of RIGHT leg weakness and radicular symptoms down to her RIGHT foot  3.  She has POSITIVE weakness with hamstring testing on the RIGHT together with POSITIVE slump test, reproducible radicular symptoms down the RIGHT leg  4.  She has had x-ray imaging of both the lumbar spine and the hip which have been noncontributory  5.  Symptoms have been going on since October 2020  6.  She FAILED SI belt    Try gabapentin    MR Lumbar spine demonstrates:  4/14/2021 7:23 AM     HISTORY/REASON FOR EXAM: Severe chronic back pain.        TECHNIQUE/EXAM DESCRIPTION:  MRI of the lumbar spine without contrast.     The study was performed on a Siemens Skyra 3.0 Jeanne MRI scanner. T1 sagittal, T2 sagittal, and T2 axial images were obtained of the lumbar spine.     COMPARISON: None.     FINDINGS:  Vertebral body height and " alignment is well maintained throughout.  No evidence of marrow edema or fracture.  There is loss of the normal bright T2 disc signal at L4-5.  No spinal canal masses seen.  The conus is located normally at T12-L1.  There is a mass seen within the pelvis likely representing a retroverted uterus with multiple uterine fibroids.     Findings at specific levels:     T12-L1: No significant central canal or neuroforaminal narrowing.     L1-2: No significant central canal or neuroforaminal narrowing.     L2-3: No significant central canal or neuroforaminal narrowing.     L3-4: No significant central canal or neuroforaminal narrowing.     L4-5: There is annular disc bulge with a small right paracentral disc protrusion. There is mild bilateral facet degeneration. No central canal narrowing. There is no significant neural foraminal narrowing. There is a tiny tear of the annulus fibrosis.     L5-S1: No significant central canal or neuroforaminal narrowing.     IMPRESSION:     1.  L4-5 disc bulge with small right paracentral disc protrusion and tiny tear of the annulus fibrosis. No significant central canal or neural foraminal narrowing.     2.  Pelvic mass likely representing a retroverted uterus with multiple uterine fibroids.        Interpreted in the office today with the patient        1/21/2021 12:58 PM     HISTORY/REASON FOR EXAM:  Back pain and right lateral hip pain        TECHNIQUE/ EXAM DESCRIPTION AND NUMBER OF VIEWS:  3 views of the lumbar spine.     COMPARISON: None.     FINDINGS:  The alignment of the lumbar spine is within normal limits.     The vertebral body heights and disc spaces are normal.     There is no arthropathy identified.     SI joints are normal. Visualized pelvic bones are unremarkable.     IMPRESSION:     1.  Normal lumbar spine series.          1/21/2021 12:57 PM     HISTORY/REASON FOR EXAM:  Right lateral hip pain.        TECHNIQUE/EXAM DESCRIPTION AND NUMBER OF VIEWS:  2 views of the RIGHT  hip.     COMPARISON: None     FINDINGS:  There is no acute fracture or malalignment of either hip joint.     There is a tiny ossific density projecting adjacent to the superior-lateral right and left acetabulum likely representing small os acetabuli.     There is no evidence of a femoral head neck bump.     The SI joints are normal. Pubic symphysis is normal.     There are a few incidental left hemipelvic phlebolith.     IMPRESSION:     1.  There are bilateral os acetabuli. These can be associated with acetabular impingement-type syndromes.  2.  Exam is otherwise normal.    Thank you Jasmin Palencia M.D. for allowing me to participate in caring for your patient.

## 2021-04-16 NOTE — Clinical Note
Magaly Brewer seems to have a symptomatic disc herniation which correlates with her radicular symptoms.  Hoping you guys can get her in for eval/possible BELGICA. L

## 2021-04-22 ENCOUNTER — OFFICE VISIT (OUTPATIENT)
Dept: PHYSICAL MEDICINE AND REHAB | Facility: MEDICAL CENTER | Age: 36
End: 2021-04-22
Payer: COMMERCIAL

## 2021-04-22 VITALS
OXYGEN SATURATION: 98 % | SYSTOLIC BLOOD PRESSURE: 112 MMHG | HEART RATE: 73 BPM | DIASTOLIC BLOOD PRESSURE: 78 MMHG | WEIGHT: 174.16 LBS | BODY MASS INDEX: 29.73 KG/M2 | TEMPERATURE: 97.5 F | HEIGHT: 64 IN

## 2021-04-22 DIAGNOSIS — M51.26 LUMBAR DISC HERNIATION: ICD-10-CM

## 2021-04-22 DIAGNOSIS — M54.16 LUMBAR RADICULOPATHY: ICD-10-CM

## 2021-04-22 DIAGNOSIS — G89.29 CHRONIC RIGHT-SIDED LOW BACK PAIN WITH RIGHT-SIDED SCIATICA: ICD-10-CM

## 2021-04-22 DIAGNOSIS — R55 VASOVAGAL REACTION: ICD-10-CM

## 2021-04-22 DIAGNOSIS — M54.41 CHRONIC RIGHT-SIDED LOW BACK PAIN WITH RIGHT-SIDED SCIATICA: ICD-10-CM

## 2021-04-22 PROCEDURE — 99205 OFFICE O/P NEW HI 60 MIN: CPT | Performed by: PHYSICAL MEDICINE & REHABILITATION

## 2021-04-22 RX ORDER — TIZANIDINE 4 MG/1
4 TABLET ORAL NIGHTLY PRN
Qty: 30 TABLET | Refills: 2 | Status: SHIPPED | OUTPATIENT
Start: 2021-04-22 | End: 2021-05-18 | Stop reason: SDUPTHER

## 2021-04-22 ASSESSMENT — PATIENT HEALTH QUESTIONNAIRE - PHQ9: CLINICAL INTERPRETATION OF PHQ2 SCORE: 0

## 2021-04-22 ASSESSMENT — FIBROSIS 4 INDEX: FIB4 SCORE: .4948716593053935123

## 2021-04-22 ASSESSMENT — PAIN SCALES - GENERAL: PAINLEVEL: 3=SLIGHT PAIN

## 2021-04-22 NOTE — PATIENT INSTRUCTIONS
Your procedure will be at the [facility] special procedure suite.    [facility] [place] [address]       PRE-PROCEDURE INSTRUCTIONS  You may take your regular medications except:   · No Anti-inflammatories 5 days prior to your procedure. Anti-inflammatories include medicines such as  ibuprofen (Motrin, Advil), Excedrin, Naproxen (Aleve, Anaprox, Naprelan, Naprosyn), Celecoxib (Celebrex), Diclofenac (Voltaren-XR tab), and Meloxicam (Mobic).   · You can take the remainder of your pain medications as prescribed.   · If you are having a diagnostic procedure such as a medial branch block, do not use her pain meds on the day of the procedure  · No Glucophage or Metformin 24 hours before your procedure. You may resume next day after your procedure.  · Call the physiatry office if you are taking or prescribed anti-biotics within five days of procedure.  · Please ask provider if you are taking any new diabetes medication.  · CONTINUE TAKING BLOOD PRESSURE MEDICATIONS AS PRESCRIBED.  · Pain medications will not be prescribed on the procedure day. Procedural pain medication may be used by your provider   · Call your doctor's office performing the procedure if you have a fever, chills, rash or new illness prior to your procedure    Anticoagulation/antiplatelet medications  No Blood thinning medications such as Coumadin, Xarelto, aspirin or Plavix 5 days prior to procedure unless your doctor said to continue these medications. Call your doctor if a new medication is prescribed in this class.     Restrictions for eating before procedure:   · If you are getting procedural sedation, then do not eat to for 8 hours prior to procedure appointment time. Do not drink fluids for four hours prior to your procedure time.   · If you are not having procedural sedation, then Skip the meal prior to your procedure. If you have a morning procedure then skip breakfast. If you have an afternoon procedure then skip lunch.   · You  may drink clear liquids up to 2 hours prior to your procedure  · You must have a  the day of procedure to accompany you home.      POST PROCEDURE INSTRUCTIONS   · No heavy lifting, strenuous bending or strenuous exercise for 3 days after your procedure.  · No hot tubs, baths, swimming for 3 days after your procedure  · You can remove the bandage the day after the procedure.  · IF YOU RECEIVED A STEROID INJECTION. PLEASE NOTE THAT THERE MAY BE A DELAY FOR THE INJECTION TO START WORKING, THE DELAY MAY BE UP TO TWO WEEKS. IF YOU HAVE DIABETES, PLEASE NOTE THAT YOUR SUGAR LEVELS MAY BE ELEVATED FOR 1-2 DAYS AFTER A STEROID INJECTION.  THE STEROID MAY CAUSE TEMPORARY SYMPTOMS WHICH USUALLY RESOLVE ON THEIR OWN WITHIN 1 TO 2 DAYS INCLUDING FACIAL FLUSHING OR A FEELING OF WARMTH ON THE FACE, TEMPORARY INCREASES IN BLOOD SUGAR, INSOMNIA, INCREASED HUNGER  · IF YOU EXPERIENCE PROLONGED WEAKNESS LONGER THAN ONE DAY, BOWEL OR BLADDER INCONTINENCE THEN PLEASE CALL THE PHYSIATRY OFFICE.  · Your leg may feel heavy, weak and numb for up to 1-2 days. Be very careful walking.   ·  You may resume normal activities 3 days after procedure.

## 2021-04-22 NOTE — PROGRESS NOTES
New patient note    Physiatry (physical medicine and  Rehabilitation), interventional spine and sports medicine    Date of service: See epic    Chief complaint:   Chief Complaint   Patient presents with   • New Patient     Back pain        Referring provider: Carlos Renner M.D.     HISTORY    HPI: Magaly Lynch 35 y.o.  who presents today with Diagnoses of Lumbar radiculopathy, Chronic right-sided low back pain with right-sided sciatica, Lumbar disc herniation, and Vasovagal reaction were pertinent to this visit.    HPI  The patient has a history of a meniscus tear.  She has resumed running and while running she had an injury mostly in the right hip.  This is now radiating down from the right low back and hip down the posterior aspect of the right leg.  She has worked with physical therapy, sports medicine and her primary care physician on this.5/10 intensity. Constant. Denies numbness or weakness.     Medications tried include tylenol, gabapentin, NSAIDs, steroids    She did 6 weeks of physical therapy. She has a home exercise program and she has done that exercise program including the past two months.     Medical records review:  I reviewed the note from the referring provider Carlos Renner M.D. including the note dated 4/16/2020 and 4/5/2021.  I also reviewed multiple notes from physical therapy from Tammie Torres including most recent note from 4/1/2021..           ROS:   Red Flags ROS:   Fever, Chills, Sweats: Denies  Involuntary Weight Loss: Denies  Bladder Incontinence: Denies  Bowel Incontinence: denies  Saddle Anesthesia: Denies    All other systems reviewed and negative.       PMHx:   Past Medical History:   Diagnosis Date   • Crohn's disease (HCC) 20111   • Hip pain, bilateral    • Kidney stone 2008 to 2015    4x (mostly on right side).    • Migraines 1998   • MRSA infection     sinus infection   • Ovarian cyst     age 18, burst         Current Outpatient Medications on File Prior to Visit    Medication Sig Dispense Refill   • ethinyl estradiol-etonogestrel (NUVARING) 0.12-0.015 MG/24HR vaginal ring 1 ring PV x3wk, off x1wk. 3 Each 3   • inFLIXimab (REMICADE) 100 MG Recon Soln Infuse  into a venous catheter.     • Adapalene 0.3 % Gel      • Omega-3 Fatty Acids (FISH OIL) 1000 MG Cap capsule Take 1,000 mg by mouth every day.     • therapeutic multivitamin-minerals (THERAGRAN-M) Tab Take 1 Tab by mouth every day.     • Probiotic Product (PROBIOTIC-10 PO) Take  by mouth.       No current facility-administered medications on file prior to visit.        PSHx:   Past Surgical History:   Procedure Laterality Date   • DENTAL EXTRACTION(S)         Family history   Family History   Problem Relation Age of Onset   • Breast Cancer Mother 57        no genetic markers   • Other Mother         MS, fibroids/hysterectomy   • Cancer Maternal Grandmother         uterine or fibrioids?, breast, skin, lung   • Lung Disease Maternal Grandmother         emphysema   • Psychiatric Illness Maternal Grandmother         suicide   • GI Disease Maternal Grandfather         Crohn's   • GI Disease Maternal Aunt         Crohn's         Medications: reviewed on epic.   Outpatient Medications Marked as Taking for the 4/22/21 encounter (Office Visit) with Godfrey Vigil M.D.   Medication Sig Dispense Refill   • tizanidine (ZANAFLEX) 4 MG Tab Take 1 tablet by mouth at bedtime as needed (muscle spasms). 30 tablet 2   • ethinyl estradiol-etonogestrel (NUVARING) 0.12-0.015 MG/24HR vaginal ring 1 ring PV x3wk, off x1wk. 3 Each 3   • inFLIXimab (REMICADE) 100 MG Recon Soln Infuse  into a venous catheter.     • Adapalene 0.3 % Gel      • Omega-3 Fatty Acids (FISH OIL) 1000 MG Cap capsule Take 1,000 mg by mouth every day.     • therapeutic multivitamin-minerals (THERAGRAN-M) Tab Take 1 Tab by mouth every day.     • Probiotic Product (PROBIOTIC-10 PO) Take  by mouth.          Allergies:   Allergies   Allergen Reactions   • Sumatriptan Shortness  of Breath   • Infliximab      15min into infusion at 10cc/hr, pt reported chest tightness/ ticklish feeling and cough. Infusion stopped. O2 supplement/Benadryl /albuterol administered. Monitored for about 2hrs and discharged in stable condition. VSS throughout the treatment.    • Cedarwood Oil    • Grass Extracts [Gramineae Pollens]    • Lidocaine      Nasal spray causes fainting (dental shots were okay)   • Poison [place] Extract [Extract Of Poison [place]]    • Pollen Extract    • Prochlorperazine      distonic   • Promethazine      distonic   • Zolmitriptan      sleepy   • Azathioprine Itching and Rash       Social Hx:   Social History     Socioeconomic History   • Marital status: Single     Spouse name: Not on file   • Number of children: Not on file   • Years of education: Not on file   • Highest education level: Not on file   Occupational History   • Occupation: pharmceutical      Comment: Prior job   Tobacco Use   • Smoking status: Never Smoker   • Smokeless tobacco: Never Used   Substance and Sexual Activity   • Alcohol use: Yes     Comment: once a week   • Drug use: Yes     Types: Marijuana     Comment: past hallucinogens, smoking (vape)   • Sexual activity: Yes     Partners: Female, Male   Other Topics Concern   •  Service No   • Blood Transfusions No   • Caffeine Concern No   • Occupational Exposure No   • Hobby Hazards No   • Sleep Concern No   • Stress Concern No   • Weight Concern No   • Special Diet Yes   • Back Care No   • Exercise Yes   • Bike Helmet No   • Seat Belt Yes   • Self-Exams No   Social History Narrative   • Not on file     Social Determinants of Health     Financial Resource Strain:    • Difficulty of Paying Living Expenses:    Food Insecurity:    • Worried About Running Out of Food in the Last Year:    • Ran Out of Food in the Last Year:    Transportation Needs:    • Lack of Transportation (Medical):    • Lack of Transportation (Non-Medical):    Physical Activity:    • Days of  "Exercise per Week:    • Minutes of Exercise per Session:    Stress:    • Feeling of Stress :    Social Connections:    • Frequency of Communication with Friends and Family:    • Frequency of Social Gatherings with Friends and Family:    • Attends Church Services:    • Active Member of Clubs or Organizations:    • Attends Club or Organization Meetings:    • Marital Status:    Intimate Partner Violence:    • Fear of Current or Ex-Partner:    • Emotionally Abused:    • Physically Abused:    • Sexually Abused:          EXAMINATION     Physical Exam:   Vitals: /78 (BP Location: Left arm, Patient Position: Sitting, BP Cuff Size: Adult)   Pulse 73   Temp 36.4 °C (97.5 °F) (Temporal)   Ht 1.626 m (5' 4\")   Wt 79 kg (174 lb 2.6 oz)   SpO2 98%     Constitutional:   Body Habitus: Body mass index is 29.9 kg/m².  Cooperation: Fully cooperates with exam  Appearance: Well-groomed, well-nourished, not disheveled     Eyes: No scleral icterus to suggest severe liver disease, no proptosis to suggest severe hyperthyroid    ENT -no obvious auditory deficits, no obvious tongue lesions, tongue midline, no facial droop     Skin -no rashes or lesions noted     Respiratory-  breathing comfortable on room air, no audible wheezing    Cardiovascular- capillary refills less than 2 seconds. No lower extremity edema is noted.     Gastrointestinal - no obvious abdominal masses, No tenderness to palpation in the abdomen    Psychiatric- alert and oriented ×3. Normal affect.     Gait - normal gait, no use of ambulatory device, nonantalgic. the patient can toe walk with ease. the patient can heel walk with ease. the patient can tandem walk with ease. balance is appropriate..     Musculoskeletal and Neuro -         Thoracic/Lumbar Spine/Sacral Spine/Hips   Inspection: No evidence of atrophy in bilateral lower extremities throughout     ROM: full  active range of motion with flexion, lateral flexion, and rotation bilaterally.   There is " full  active range of motion with lumbar extension.    Palpation:   No tenderness to palpation in midline at T1-T12 levels. No tenderness to palpation in the left and right of the midline T1-L5, NEGATIVE for tenderness to palpation to the para-midline region in the lower lumbar levels.  palpation over SI joint: negative bilaterally    palpation in hip or over the gluteus medius tendon insertion: negative bilaterally      Lumbar spine Special tests  Neuro tension  Straight leg test positive right, negative left    Slump test positive right, negative left      HIP  FAIR test negative bilaterally          Key points for the international standards for neurological classification of spinal cord injury (ISNCSCI) to light touch.     Dermatome R L                                      L2 2 2   L3 2 2   L4 1 2   L5 1 2   S1 2 2   S2 2 2       Motor Exam Lower Extremities    ? Myotome R L   Hip flexion L2 5 5   Knee extension L3 5 5   Ankle dorsiflexion L4 5 5   Toe extension L5 5 5   Ankle plantarflexion S1 5 5         Reflexes  ?  R L                Patella  2+ 2+   Achilles   2+ 2+       Babinski sign negative bilaterally   Clonus of the ankle negative bilaterally       MEDICAL DECISION MAKING    Medical records review: see under HPI section.     DATA    Labs:   Lab Results   Component Value Date/Time    SODIUM 140 02/14/2020 08:52 AM    POTASSIUM 4.7 02/14/2020 08:52 AM    CHLORIDE 109 02/14/2020 08:52 AM    CO2 23 02/14/2020 08:52 AM    ANION 8.0 02/14/2020 08:52 AM    GLUCOSE 86 02/14/2020 08:52 AM    BUN 12 02/14/2020 08:52 AM    CREATININE 0.88 02/14/2020 08:52 AM    CALCIUM 9.2 02/14/2020 08:52 AM    ASTSGOT 16 02/14/2020 08:52 AM    ALTSGPT 12 02/14/2020 08:52 AM    TBILIRUBIN 0.4 02/14/2020 08:52 AM    ALBUMIN 3.9 02/14/2020 08:52 AM    TOTPROTEIN 7.1 02/14/2020 08:52 AM    GLOBULIN 3.2 02/14/2020 08:52 AM    AGRATIO 1.2 02/14/2020 08:52 AM   ]    No results found for: PROTHROMBTM, INR     Lab Results   Component  Value Date/Time    WBC 5.3 02/14/2020 08:52 AM    RBC 4.59 02/14/2020 08:52 AM    HEMOGLOBIN 14.5 02/14/2020 08:52 AM    HEMATOCRIT 43.5 02/14/2020 08:52 AM    MCV 94.8 02/14/2020 08:52 AM    MCH 31.6 02/14/2020 08:52 AM    MCHC 33.3 (L) 02/14/2020 08:52 AM    MPV 10.2 02/14/2020 08:52 AM    NEUTSPOLYS 51.80 02/14/2020 08:52 AM    LYMPHOCYTES 38.10 02/14/2020 08:52 AM    MONOCYTES 5.50 02/14/2020 08:52 AM    EOSINOPHILS 2.50 02/14/2020 08:52 AM    BASOPHILS 1.30 02/14/2020 08:52 AM        No results found for: HBA1C     Imaging:   I personally reviewed following images, these are my reads  MRI Lumbar spine 4/14/2021  There was a small high intensity zone in the right paracentric region consistent with an annular tear and a small disc herniation.  No significant neuroforaminal or central canal stenosis.  However annular tears can cause a chemical neuritis.    IMAGING radiology reads. I reviewed the following radiology reads     Results for orders placed in visit on 04/14/21   MR-LUMBAR SPINE-W/O    Impression 1.  L4-5 disc bulge with small right paracentral disc protrusion and tiny tear of the annulus fibrosis. No significant central canal or neural foraminal narrowing.    2.  Pelvic mass likely representing a retroverted uterus with multiple uterine fibroids.                                                                                                                 Results for orders placed during the hospital encounter of 01/21/21   DX-LUMBAR SPINE-2 OR 3 VIEWS    Impression 1.  Normal lumbar spine series.                                           Diagnosis   Visit Diagnoses     ICD-10-CM   1. Lumbar radiculopathy  M54.16   2. Chronic right-sided low back pain with right-sided sciatica  M54.41    G89.29   3. Lumbar disc herniation  M51.26   4. Vasovagal reaction  R55           ASSESSMENT AND PLAN:  Magaly Lynch 35 y.o. female      Magaly was seen today for new patient.    Diagnoses and all orders for this  visit:    Lumbar radiculopathy  -     REFERRAL TO PHYSICAL MEDICINE REHAB  -     tizanidine (ZANAFLEX) 4 MG Tab; Take 1 tablet by mouth at bedtime as needed (muscle spasms).    Chronic right-sided low back pain with right-sided sciatica  -     tizanidine (ZANAFLEX) 4 MG Tab; Take 1 tablet by mouth at bedtime as needed (muscle spasms).    Lumbar disc herniation  -     tizanidine (ZANAFLEX) 4 MG Tab; Take 1 tablet by mouth at bedtime as needed (muscle spasms).    Vasovagal reaction        The patient is failed conservative treatments including physical therapy medication management continues to have pain and functional deficits.  I believe this is mostly secondary to her disc herniation with lumbar radiculopathy.  Abnormal sensation was detected on exam as well.  Straight leg raise and slump test are positive on the right negative on the left.      Diagnostic workup: Procedure below for diagnostic and therapeutic purposes    Medications:   As above     Interventional program:   I have ordered a right L4-5 transforaminal epidural steroid injection with sedation    The risks benefits and alternatives to this procedure were discussed and the patient wishes to proceed with the procedure. Risks include but are not limited to damage to surrounding structures, infection, bleeding, worsening of pain which can be permanent, weakness which can be permanent. Benefits include pain relief, improved function. Alternatives includes not doing the procedure.          Follow-up: After the above diagnostic and therapeutic procedure    Total time spent on the day of the encounter: 65 minutes.  This includes counseling, care coordination, medical records review.          Please note that this dictation was created using voice recognition software. I have made every reasonable attempt to correct obvious errors but there may be errors of grammar and content that I may have overlooked prior to finalization of this note.      Godfrey Vigil,  MD  Physical Medicine and Rehabilitation  Interventional Spine and Sports Physiatry  [facility]           Carlos Alegria M.D. CC Vanessa A. Tabaac, M.D.       Addendum   In further discussion with the patient her pain level is typically 8 out of 10 in intensity.

## 2021-05-03 ENCOUNTER — TELEPHONE (OUTPATIENT)
Dept: PHYSICAL THERAPY | Facility: MEDICAL CENTER | Age: 36
End: 2021-05-03

## 2021-05-03 NOTE — OP THERAPY DISCHARGE SUMMARY
Outpatient Physical Therapy  DISCHARGE SUMMARY NOTE      [facility] Outpatient Physical Therapy  [address]  [place] [address]  Phone:  [phone]  Fax:  [phone]    Date of Visit: 05/03/2021    Patient: Magaly Lynch  YOB: 1985  MRN: [id number]     Referring Provider: No referring provider defined for this encounter.   Referring Diagnosis No admission diagnoses are documented for this encounter.         Functional Assessment Used        Your patient is being discharged from Physical Therapy with the following comments:   · Goals partially met    Comments:  Patient referred back to Dr. Renner due to continued pain in low back and hip.     Recommendations:  D/C from PT at this time.     Tammie Torres, PT, DPT    Date: 5/3/2021

## 2021-05-04 ENCOUNTER — OFFICE VISIT (OUTPATIENT)
Dept: MEDICAL GROUP | Facility: IMAGING CENTER | Age: 36
End: 2021-05-04
Payer: COMMERCIAL

## 2021-05-04 VITALS
HEART RATE: 73 BPM | DIASTOLIC BLOOD PRESSURE: 80 MMHG | TEMPERATURE: 98.8 F | OXYGEN SATURATION: 96 % | WEIGHT: 175 LBS | HEIGHT: 64 IN | RESPIRATION RATE: 14 BRPM | SYSTOLIC BLOOD PRESSURE: 128 MMHG | BODY MASS INDEX: 29.88 KG/M2

## 2021-05-04 DIAGNOSIS — D21.9 FIBROID: ICD-10-CM

## 2021-05-04 PROCEDURE — 99213 OFFICE O/P EST LOW 20 MIN: CPT | Performed by: FAMILY MEDICINE

## 2021-05-04 ASSESSMENT — ENCOUNTER SYMPTOMS
WHEEZING: 0
VOMITING: 0
DIZZINESS: 0
MYALGIAS: 0
BACK PAIN: 1
PALPITATIONS: 0
EYE PAIN: 0
DIARRHEA: 0
ABDOMINAL PAIN: 1
DOUBLE VISION: 0
SHORTNESS OF BREATH: 0
COUGH: 0
CHILLS: 0
FEVER: 0
HEADACHES: 0
NAUSEA: 0

## 2021-05-04 ASSESSMENT — PAIN SCALES - GENERAL: PAINLEVEL: 4=SLIGHT-MODERATE PAIN

## 2021-05-04 ASSESSMENT — PATIENT HEALTH QUESTIONNAIRE - PHQ9: CLINICAL INTERPRETATION OF PHQ2 SCORE: 0

## 2021-05-04 ASSESSMENT — FIBROSIS 4 INDEX: FIB4 SCORE: .4948716593053935123

## 2021-05-04 NOTE — PROGRESS NOTES
Chief Complaint   Patient presents with   • Fibroids     uterine      HPI:  35 year old with crohns here with severe cramps during her period. Ibuprofen controls the cramps though irritates her chrons. She also gets random cramping throughout the day not related to her period. She is on the nuva ring periods reg and every 5 days. Heavy 5-10 ml per day per patient. Mother did have fibroids and needed a hysterectomy. She would like to meet with specialist to plan for hysterectomy.     She will be getting steroid injections in the spine. Patient is having 8/10 back pain today.     Allergies   Allergen Reactions   • Sumatriptan Shortness of Breath   • Infliximab      15min into infusion at 10cc/hr, pt reported chest tightness/ ticklish feeling and cough. Infusion stopped. O2 supplement/Benadryl /albuterol administered. Monitored for about 2hrs and discharged in stable condition. VSS throughout the treatment.    • [place] Oil    • Grass Extracts [Gramineae Pollens]    • Lidocaine      Nasal spray causes fainting (dental shots were okay)   • Poison [place] Extract [Extract Of Poison [place]]    • Pollen Extract    • Prochlorperazine      distonic   • Promethazine      distonic   • Zolmitriptan      sleepy   • Azathioprine Itching and Rash     Current Outpatient Medications   Medication Sig Dispense Refill   • tizanidine (ZANAFLEX) 4 MG Tab Take 1 tablet by mouth at bedtime as needed (muscle spasms). 30 tablet 2   • ethinyl estradiol-etonogestrel (NUVARING) 0.12-0.015 MG/24HR vaginal ring 1 ring PV x3wk, off x1wk. 3 Each 3   • inFLIXimab (REMICADE) 100 MG Recon Soln Infuse  into a venous catheter.     • Adapalene 0.3 % Gel      • Omega-3 Fatty Acids (FISH OIL) 1000 MG Cap capsule Take 1,000 mg by mouth every day.     • therapeutic multivitamin-minerals (THERAGRAN-M) Tab Take 1 Tab by mouth every day.     • Probiotic Product (PROBIOTIC-10 PO) Take  by mouth.       No current facility-administered medications for this visit.  "    Social History     Tobacco Use   • Smoking status: Never Smoker   • Smokeless tobacco: Never Used   Substance Use Topics   • Alcohol use: Yes     Comment: once a week   • Drug use: Yes     Frequency: 1.0 times per week     Types: Marijuana     Comment: past hallucinogens, smoking (vape)     Family History   Problem Relation Age of Onset   • Breast Cancer Mother 57        no genetic markers   • Other Mother         MS, fibroids/hysterectomy   • Cancer Maternal Grandmother         uterine or fibrioids?, breast, skin, lung   • Lung Disease Maternal Grandmother         emphysema   • Psychiatric Illness Maternal Grandmother         suicide   • GI Disease Maternal Grandfather         Crohn's   • GI Disease Maternal Aunt         Crohn's       /80 (BP Location: Right arm, Patient Position: Sitting, BP Cuff Size: Adult)   Pulse 73   Temp 37.1 °C (98.8 °F) (Temporal)   Resp 14   Ht 1.626 m (5' 4\")   Wt 79.4 kg (175 lb)   SpO2 96%  Body mass index is 30.04 kg/m².  Review of Systems   Constitutional: Negative for chills, fever and malaise/fatigue.   HENT: Negative for hearing loss and tinnitus.    Eyes: Negative for double vision and pain.   Respiratory: Negative for cough, shortness of breath and wheezing.    Cardiovascular: Negative for chest pain, palpitations and leg swelling.   Gastrointestinal: Positive for abdominal pain. Negative for diarrhea, nausea and vomiting.   Genitourinary: Negative for dysuria and frequency.   Musculoskeletal: Positive for back pain. Negative for joint pain and myalgias.   Skin: Negative for itching and rash.   Neurological: Negative for dizziness and headaches.     Physical Exam   Constitutional: She is well-developed, well-nourished, and in no distress. No distress.   HENT:   Head: Normocephalic and atraumatic.   Eyes: Pupils are equal, round, and reactive to light. Conjunctivae and EOM are normal. Right eye exhibits no discharge. Left eye exhibits no discharge. No scleral " icterus.   Neck: No thyromegaly present.   Pulmonary/Chest: Effort normal. No respiratory distress.   Abdominal: She exhibits no distension.   Musculoskeletal:         General: No edema.   Neurological: She is alert.   Skin: Skin is warm and dry. She is not diaphoretic.   Psychiatric: Affect and judgment normal.         All labs (last 1 month):   No visits with results within 1 Month(s) from this visit.   Latest known visit with results is:   Hospital Outpatient Visit on 02/14/2020   Component Date Value Ref Range Status   • Sodium 02/14/2020 140  135 - 145 mmol/L Final   • Potassium 02/14/2020 4.7  3.6 - 5.5 mmol/L Final   • Chloride 02/14/2020 109  96 - 112 mmol/L Final   • Co2 02/14/2020 23  20 - 33 mmol/L Final   • Anion Gap 02/14/2020 8.0  0.0 - 11.9 Final   • Glucose 02/14/2020 86  65 - 99 mg/dL Final   • Bun 02/14/2020 12  8 - 22 mg/dL Final   • Creatinine 02/14/2020 0.88  0.50 - 1.40 mg/dL Final   • Calcium 02/14/2020 9.2  8.5 - 10.5 mg/dL Final   • AST(SGOT) 02/14/2020 16  12 - 45 U/L Final   • ALT(SGPT) 02/14/2020 12  2 - 50 U/L Final   • Alkaline Phosphatase 02/14/2020 38  30 - 99 U/L Final   • Total Bilirubin 02/14/2020 0.4  0.1 - 1.5 mg/dL Final   • Albumin 02/14/2020 3.9  3.2 - 4.9 g/dL Final   • Total Protein 02/14/2020 7.1  6.0 - 8.2 g/dL Final   • Globulin 02/14/2020 3.2  1.9 - 3.5 g/dL Final   • A-G Ratio 02/14/2020 1.2  g/dL Final   • WBC 02/14/2020 5.3  4.8 - 10.8 K/uL Final   • RBC 02/14/2020 4.59  4.20 - 5.40 M/uL Final   • Hemoglobin 02/14/2020 14.5  12.0 - 16.0 g/dL Final   • Hematocrit 02/14/2020 43.5  37.0 - 47.0 % Final   • MCV 02/14/2020 94.8  81.4 - 97.8 fL Final   • MCH 02/14/2020 31.6  27.0 - 33.0 pg Final   • MCHC 02/14/2020 33.3* 33.6 - 35.0 g/dL Final   • RDW 02/14/2020 40.7  35.9 - 50.0 fL Final   • Platelet Count 02/14/2020 308  164 - 446 K/uL Final   • MPV 02/14/2020 10.2  9.0 - 12.9 fL Final   • Neutrophils-Polys 02/14/2020 51.80  44.00 - 72.00 % Final   • Lymphocytes 02/14/2020  38.10  22.00 - 41.00 % Final   • Monocytes 02/14/2020 5.50  0.00 - 13.40 % Final   • Eosinophils 02/14/2020 2.50  0.00 - 6.90 % Final   • Basophils 02/14/2020 1.30  0.00 - 1.80 % Final   • Immature Granulocytes 02/14/2020 0.80  0.00 - 0.90 % Final   • Nucleated RBC 02/14/2020 0.00  /100 WBC Final   • Neutrophils (Absolute) 02/14/2020 2.72  2.00 - 7.15 K/uL Final    Includes immature neutrophils, if present.   • Lymphs (Absolute) 02/14/2020 2.00  1.00 - 4.80 K/uL Final   • Monos (Absolute) 02/14/2020 0.29  0.00 - 0.85 K/uL Final   • Eos (Absolute) 02/14/2020 0.13  0.00 - 0.51 K/uL Final   • Baso (Absolute) 02/14/2020 0.07  0.00 - 0.12 K/uL Final   • Immature Granulocytes (abs) 02/14/2020 0.04  0.00 - 0.11 K/uL Final   • NRBC (Absolute) 02/14/2020 0.00  K/uL Final   • GFR If  02/14/2020 >60  >60 mL/min/1.73 m 2 Final   • GFR If Non  02/14/2020 >60  >60 mL/min/1.73 m 2 Final       Lipids: No results found for: CHOLSTRLTOT, TRIGLYCERIDE, HDL, LDL    Imaging: MR-LUMBAR SPINE-W/O    Result Date: 4/14/2021 4/14/2021 7:23 AM HISTORY/REASON FOR EXAM: Severe chronic back pain. TECHNIQUE/EXAM DESCRIPTION: MRI of the lumbar spine without contrast. The study was performed on a Siemens Skyra 3.0 Jeanne MRI scanner. T1 sagittal, T2 sagittal, and T2 axial images were obtained of the lumbar spine. COMPARISON: None. FINDINGS: Vertebral body height and alignment is well maintained throughout. No evidence of marrow edema or fracture. There is loss of the normal bright T2 disc signal at L4-5. No spinal canal masses seen. The conus is located normally at T12-L1. There is a mass seen within the pelvis likely representing a retroverted uterus with multiple uterine fibroids. Findings at specific levels: T12-L1: No significant central canal or neuroforaminal narrowing. L1-2: No significant central canal or neuroforaminal narrowing. L2-3: No significant central canal or neuroforaminal narrowing. L3-4: No  significant central canal or neuroforaminal narrowing. L4-5: There is annular disc bulge with a small right paracentral disc protrusion. There is mild bilateral facet degeneration. No central canal narrowing. There is no significant neural foraminal narrowing. There is a tiny tear of the annulus fibrosis. L5-S1: No significant central canal or neuroforaminal narrowing.     1.  L4-5 disc bulge with small right paracentral disc protrusion and tiny tear of the annulus fibrosis. No significant central canal or neural foraminal narrowing. 2.  Pelvic mass likely representing a retroverted uterus with multiple uterine fibroids.      A/P    Return if symptoms worsen or fail to improve.    Problem List Items Addressed This Visit     Fibroid    Relevant Orders    REFERRAL TO OB/GYN          Portions of this note may be dictated using Dragon NaturallySpeaking voice recognition software.  Variances in spelling and vocabulary are possible and unintentional.  Not all areas may be caught/corrected.  Please notify me if any discrepancies are noted or if the meaning of any statement is not correct/clear.

## 2021-05-06 ENCOUNTER — HOSPITAL ENCOUNTER (OUTPATIENT)
Dept: LAB | Facility: MEDICAL CENTER | Age: 36
End: 2021-05-06
Attending: INTERNAL MEDICINE
Payer: COMMERCIAL

## 2021-05-06 LAB
ALBUMIN SERPL BCP-MCNC: 4 G/DL (ref 3.2–4.9)
ALBUMIN/GLOB SERPL: 1.2 G/DL
ALP SERPL-CCNC: 54 U/L (ref 30–99)
ALT SERPL-CCNC: 17 U/L (ref 2–50)
ANION GAP SERPL CALC-SCNC: 10 MMOL/L (ref 7–16)
AST SERPL-CCNC: 21 U/L (ref 12–45)
BASOPHILS # BLD AUTO: 1.1 % (ref 0–1.8)
BASOPHILS # BLD: 0.09 K/UL (ref 0–0.12)
BILIRUB SERPL-MCNC: 0.6 MG/DL (ref 0.1–1.5)
BUN SERPL-MCNC: 11 MG/DL (ref 8–22)
CALCIUM SERPL-MCNC: 9.2 MG/DL (ref 8.5–10.5)
CHLORIDE SERPL-SCNC: 102 MMOL/L (ref 96–112)
CO2 SERPL-SCNC: 23 MMOL/L (ref 20–33)
CREAT SERPL-MCNC: 0.69 MG/DL (ref 0.5–1.4)
CRP SERPL HS-MCNC: 0.33 MG/DL (ref 0–0.75)
EOSINOPHIL # BLD AUTO: 0.48 K/UL (ref 0–0.51)
EOSINOPHIL NFR BLD: 5.8 % (ref 0–6.9)
ERYTHROCYTE [DISTWIDTH] IN BLOOD BY AUTOMATED COUNT: 39.4 FL (ref 35.9–50)
GLOBULIN SER CALC-MCNC: 3.3 G/DL (ref 1.9–3.5)
GLUCOSE SERPL-MCNC: 76 MG/DL (ref 65–99)
HCT VFR BLD AUTO: 44.2 % (ref 37–47)
HGB BLD-MCNC: 15.1 G/DL (ref 12–16)
IMM GRANULOCYTES # BLD AUTO: 0.03 K/UL (ref 0–0.11)
IMM GRANULOCYTES NFR BLD AUTO: 0.4 % (ref 0–0.9)
LYMPHOCYTES # BLD AUTO: 2.24 K/UL (ref 1–4.8)
LYMPHOCYTES NFR BLD: 27.1 % (ref 22–41)
MCH RBC QN AUTO: 31.4 PG (ref 27–33)
MCHC RBC AUTO-ENTMCNC: 34.2 G/DL (ref 33.6–35)
MCV RBC AUTO: 91.9 FL (ref 81.4–97.8)
MONOCYTES # BLD AUTO: 0.43 K/UL (ref 0–0.85)
MONOCYTES NFR BLD AUTO: 5.2 % (ref 0–13.4)
NEUTROPHILS # BLD AUTO: 4.99 K/UL (ref 2–7.15)
NEUTROPHILS NFR BLD: 60.4 % (ref 44–72)
NRBC # BLD AUTO: 0 K/UL
NRBC BLD-RTO: 0 /100 WBC
PLATELET # BLD AUTO: 277 K/UL (ref 164–446)
PMV BLD AUTO: 10.6 FL (ref 9–12.9)
POTASSIUM SERPL-SCNC: 3.9 MMOL/L (ref 3.6–5.5)
PROT SERPL-MCNC: 7.3 G/DL (ref 6–8.2)
RBC # BLD AUTO: 4.81 M/UL (ref 4.2–5.4)
SODIUM SERPL-SCNC: 135 MMOL/L (ref 135–145)
WBC # BLD AUTO: 8.3 K/UL (ref 4.8–10.8)

## 2021-05-06 PROCEDURE — 80230 DRUG ASSAY INFLIXIMAB: CPT

## 2021-05-06 PROCEDURE — 86140 C-REACTIVE PROTEIN: CPT

## 2021-05-06 PROCEDURE — 36415 COLL VENOUS BLD VENIPUNCTURE: CPT

## 2021-05-06 PROCEDURE — 85025 COMPLETE CBC W/AUTO DIFF WBC: CPT

## 2021-05-06 PROCEDURE — 80053 COMPREHEN METABOLIC PANEL: CPT

## 2021-05-06 PROCEDURE — 82397 CHEMILUMINESCENT ASSAY: CPT

## 2021-05-08 LAB
INFLIXIMAB AB SERPL-MCNC: NOT DETECTED NG/ML
INFLIXIMAB SERPL-MCNC: 5.23 UG/ML
PATHOLOGY STUDY: NORMAL

## 2021-05-11 ENCOUNTER — TELEPHONE (OUTPATIENT)
Dept: PHYSICAL MEDICINE AND REHAB | Facility: MEDICAL CENTER | Age: 36
End: 2021-05-11

## 2021-05-17 ENCOUNTER — APPOINTMENT (OUTPATIENT)
Dept: RADIOLOGY | Facility: REHABILITATION | Age: 36
End: 2021-05-17
Attending: PHYSICAL MEDICINE & REHABILITATION
Payer: COMMERCIAL

## 2021-05-17 ENCOUNTER — HOSPITAL ENCOUNTER (OUTPATIENT)
Facility: REHABILITATION | Age: 36
End: 2021-05-17
Attending: PHYSICAL MEDICINE & REHABILITATION | Admitting: PHYSICAL MEDICINE & REHABILITATION
Payer: COMMERCIAL

## 2021-05-17 VITALS
BODY MASS INDEX: 29.92 KG/M2 | RESPIRATION RATE: 17 BRPM | DIASTOLIC BLOOD PRESSURE: 74 MMHG | TEMPERATURE: 98.3 F | HEIGHT: 64 IN | OXYGEN SATURATION: 95 % | HEART RATE: 80 BPM | WEIGHT: 175.27 LBS | SYSTOLIC BLOOD PRESSURE: 105 MMHG

## 2021-05-17 PROCEDURE — 700117 HCHG RX CONTRAST REV CODE 255

## 2021-05-17 PROCEDURE — 700111 HCHG RX REV CODE 636 W/ 250 OVERRIDE (IP)

## 2021-05-17 PROCEDURE — 64483 NJX AA&/STRD TFRM EPI L/S 1: CPT

## 2021-05-17 PROCEDURE — 99152 MOD SED SAME PHYS/QHP 5/>YRS: CPT

## 2021-05-17 RX ORDER — MIDAZOLAM HYDROCHLORIDE 1 MG/ML
INJECTION INTRAMUSCULAR; INTRAVENOUS
Status: COMPLETED
Start: 2021-05-17 | End: 2021-05-17

## 2021-05-17 RX ORDER — DEXAMETHASONE SODIUM PHOSPHATE 10 MG/ML
INJECTION, SOLUTION INTRAMUSCULAR; INTRAVENOUS
Status: COMPLETED
Start: 2021-05-17 | End: 2021-05-17

## 2021-05-17 RX ORDER — LIDOCAINE HYDROCHLORIDE 10 MG/ML
INJECTION, SOLUTION EPIDURAL; INFILTRATION; INTRACAUDAL; PERINEURAL
Status: COMPLETED
Start: 2021-05-17 | End: 2021-05-17

## 2021-05-17 RX ADMIN — MIDAZOLAM HYDROCHLORIDE 1 MG: 1 INJECTION, SOLUTION INTRAMUSCULAR; INTRAVENOUS at 09:22

## 2021-05-17 RX ADMIN — IOHEXOL 1 ML: 240 INJECTION, SOLUTION INTRATHECAL; INTRAVASCULAR; INTRAVENOUS; ORAL at 09:24

## 2021-05-17 RX ADMIN — FENTANYL CITRATE 50 MCG: 50 INJECTION, SOLUTION INTRAMUSCULAR; INTRAVENOUS at 09:22

## 2021-05-17 RX ADMIN — LIDOCAINE HYDROCHLORIDE 10 ML: 10 INJECTION, SOLUTION EPIDURAL; INFILTRATION; INTRACAUDAL; PERINEURAL at 09:23

## 2021-05-17 RX ADMIN — DEXAMETHASONE SODIUM PHOSPHATE 10 MG: 10 INJECTION, SOLUTION INTRAMUSCULAR; INTRAVENOUS at 09:26

## 2021-05-17 ASSESSMENT — FIBROSIS 4 INDEX: FIB4 SCORE: 0.61

## 2021-05-17 ASSESSMENT — PAIN DESCRIPTION - PAIN TYPE: TYPE: CHRONIC PAIN

## 2021-05-17 NOTE — OP REPORT
Date of Service: 5/17/2021     Patient: Magaly Lynch 35 y.o. female     MRN: [id number]     Physician/s: Godfrey Vigil MD    Pre-operative Diagnosis: Lumbar radiculopathy    Post-operative Diagnosis: Lumbar radiculopathy    Procedure: right Lumbar Transforaminal Epidural Steroid  at the L4-5 levels.     Description of procedure:    The risks, benefits, and alternatives of the procedure were reviewed and discussed with the patient.  Written informed consent was freely obtained. A pre-procedural time-out was conducted by the physician verifying patient’s identity, procedure to be performed, procedure site and side, and allergy verification. Appropriate equipment was determined to be in place for the procedure.     Moderation sedation was achieved with Versed (1mg) and Fentanyl (50mcg). Monitoring of the patients vital signs and respiratory status was provided by trained independent registered nurse during the entire course of the procedures and under my supervision and recoded in the patient’s medical record. The duration of sedation was over 10 minutes.     The patient's vital signs were carefully monitored before, throughout, and after the procedure.     In the fluoroscopy suite the patient was placed in a prone position, a pillow placed underneath their umbilicus. The skin was prepped and draped in the usual sterile fashion.     The fluoroscope was placed over the lumbar spine and adjusted into the proper AP/Oblique view to enter the transforaminal space just adjacent to the pedicle at the levels below. The targets for injection were then marked at the right L4-5. A 27g 1.5 inch needle was placed into the marked site, and 1mL of 1% Lidocaine was injected subcutaneously into the epidermal and dermal layers. The needle was removed intact.  A 22g 5 inch spinal needle was then placed and advanced under fluoroscopic guidance in an oblique view towards the epidural space of the levels noted above. The needle  position was confirmed to not be past the 6 o'clock position in the AP view and it was in the neuroforamen in the lateral view.           Under live fluoroscopic guidance in the AP view, contrast dye was used to highlight the epidural space spread of each level above. Final fluoroscopic images were saved.  Following negative aspiration, 1mL of 1% lidocaine preservative free with 10 mg of dexamethasone was then injected at each level above, and the needles were removed intact after restyleted. The patient's back was covered with a 4x4 gauze, the area was cleansed with sterile normal saline, and a dressing was applied. There were no complications noted.     The patient was then evaluated post-procedure, and was hemodynamically stable prior to leaving the post-operative care unit.     Follow-up as scheduled    Godfrey Vigil MD  Physical Medicine and Rehabilitation  Interventional Spine and Sports Physiatry  [facility]          CPT codes  Transforaminal epidural injection- lumbar or sacral (first level):  59873  moderate procedural sedation first 15 minutes: [id number]

## 2021-05-17 NOTE — INTERVAL H&P NOTE
H&P reviewed. The patient was examined and there are no changes to the H&P     Lungs clear to auscultation bilaterally.  No abdominal tenderness.  Heart regular rate and rhythm.  Vitals reviewed.    Proceed with the originally scheduled procedure.  The risks, benefits and alternatives were discussed.  The patient understands these.    Pre-Op Diagnosis Codes:     * Lumbar radiculopathy [M54.16]  Procedure(s):  RIGHT L4-5 transforaminal epidural steroid injection with sedation    Godfrey Vigil MD  Physical Medicine and Rehabilitation  Interventional Spine and Sports Physiatry  [facility]

## 2021-05-17 NOTE — PROGRESS NOTES
Handoff received from pre-procedure RN. Pre assessment complete with pt input, time out completed, including 2 pt identifiers, procedure and site of procedure, allergies, stop bang = 1, pt history Crohn's, migraine. Pt positioned prone by CST, RN, XRT, ankles resting on pillow, hands tucked

## 2021-05-17 NOTE — NON-PROVIDER
Ice compress applied to the affected area. Fluids and food tolerated well.  Reviewed  home care  instructions given and  she verbalized understanding. Dr. Vigil evaluated patient.  Meets criteria for discharged, ambulatory without difficulty with designated .

## 2021-05-17 NOTE — NON-PROVIDER
Patient name verified. Procedure and site confirmed, informed consent signed.Has updated H&P. Medication allergies and current medications reviewed in epic. Has   waiting . Printed home care discharged instruction  as well as education regarding pre- post infection prevention  verbal given and understood by her. Pertinent medical health information  reviewed with in epic ( kidney stone, episode of vaso-vagal , migraine ) . Denied taking any anti- inflammatories  and anti-coagulation medication.

## 2021-05-18 ENCOUNTER — TELEPHONE (OUTPATIENT)
Dept: PHYSICAL MEDICINE AND REHAB | Facility: MEDICAL CENTER | Age: 36
End: 2021-05-18

## 2021-05-18 DIAGNOSIS — M54.41 CHRONIC RIGHT-SIDED LOW BACK PAIN WITH RIGHT-SIDED SCIATICA: ICD-10-CM

## 2021-05-18 DIAGNOSIS — M54.16 LUMBAR RADICULOPATHY: ICD-10-CM

## 2021-05-18 DIAGNOSIS — G89.29 CHRONIC RIGHT-SIDED LOW BACK PAIN WITH RIGHT-SIDED SCIATICA: ICD-10-CM

## 2021-05-18 DIAGNOSIS — M51.26 LUMBAR DISC HERNIATION: ICD-10-CM

## 2021-05-18 RX ORDER — TIZANIDINE 4 MG/1
4 TABLET ORAL NIGHTLY PRN
Qty: 30 TABLET | Refills: 2 | Status: SHIPPED | OUTPATIENT
Start: 2021-05-18 | End: 2021-10-18

## 2021-05-18 NOTE — TELEPHONE ENCOUNTER
TIMAM to call us back in regards to her SP that was done with Dr. Vigil dated 5/17/21 for her right L4-5 transforaminal epidural steroid injection with sedation and she stated that she is fine.    Thank you

## 2021-06-17 ENCOUNTER — OFFICE VISIT (OUTPATIENT)
Dept: PHYSICAL MEDICINE AND REHAB | Facility: MEDICAL CENTER | Age: 36
End: 2021-06-17
Payer: COMMERCIAL

## 2021-06-17 VITALS
SYSTOLIC BLOOD PRESSURE: 118 MMHG | TEMPERATURE: 98 F | HEIGHT: 64 IN | OXYGEN SATURATION: 97 % | BODY MASS INDEX: 30.49 KG/M2 | WEIGHT: 178.57 LBS | DIASTOLIC BLOOD PRESSURE: 76 MMHG | HEART RATE: 93 BPM

## 2021-06-17 DIAGNOSIS — M54.41 CHRONIC RIGHT-SIDED LOW BACK PAIN WITH RIGHT-SIDED SCIATICA: ICD-10-CM

## 2021-06-17 DIAGNOSIS — M51.26 LUMBAR DISC HERNIATION: ICD-10-CM

## 2021-06-17 DIAGNOSIS — M54.16 LUMBAR RADICULOPATHY: ICD-10-CM

## 2021-06-17 DIAGNOSIS — G89.29 CHRONIC RIGHT-SIDED LOW BACK PAIN WITH RIGHT-SIDED SCIATICA: ICD-10-CM

## 2021-06-17 PROCEDURE — 99214 OFFICE O/P EST MOD 30 MIN: CPT | Performed by: PHYSICAL MEDICINE & REHABILITATION

## 2021-06-17 RX ORDER — MELOXICAM 15 MG/1
15 TABLET ORAL
Qty: 14 TABLET | Refills: 0 | Status: SHIPPED | OUTPATIENT
Start: 2021-06-17 | End: 2021-10-18

## 2021-06-17 ASSESSMENT — FIBROSIS 4 INDEX: FIB4 SCORE: 0.61

## 2021-06-17 ASSESSMENT — PATIENT HEALTH QUESTIONNAIRE - PHQ9: CLINICAL INTERPRETATION OF PHQ2 SCORE: 0

## 2021-06-17 ASSESSMENT — PAIN SCALES - GENERAL: PAINLEVEL: 6=MODERATE PAIN

## 2021-06-17 NOTE — PROGRESS NOTES
Follow up patient note  Interventional spine and sports physiatry, Physical medicine rehabilitation      Chief complaint:   Chief Complaint   Patient presents with   • Follow-Up     back pain           HISTORY    Please see new patient note by Dr Vigil,  for more details.     HPI  Patient identification: Magaly Lynch ,  1985,   With Diagnoses of Lumbar radiculopathy, Lumbar disc herniation, and Chronic right-sided low back pain with right-sided sciatica were pertinent to this visit.     Procedures:  2021 right L5-S1 transforaminal epidural steroid injection with 80 percent pain relief for one month    After this patient increase her activity including walking, Pilates, home exercise program and has had a recurrence of her pain. The pain is 5-6/10 in intensity, intermittent, right low back radiating down the right leg. There is still an associated numbness radiating down the right leg.  She was also able to decrease the dosage of her Zanaflex.         ROS Red Flags :   Fever, Chills, Sweats: Denies  Involuntary Weight Loss: Denies  Bowel/Bladder Incontinence: Denies  Saddle Anesthesia: Denies        PMHx:   Past Medical History:   Diagnosis Date   • Crohn's disease (HCC)    • Hip pain, bilateral    • Kidney stone  to 2015    4x (mostly on right side).    • Migraines    • MRSA infection     sinus infection   • Ovarian cyst     age 18, burst       PSHx:   Past Surgical History:   Procedure Laterality Date   • LUMBAR TRANSFORAMINAL EPIDURAL STEROID INJECTION Right 2021    Procedure: RIGHT L4-5 transforaminal epidural steroid injection with sedation;  Surgeon: Godfrey Vigil M.D.;  Location[facility];  Service: Pain Management   • DENTAL EXTRACTION(S)         Family history   Family History   Problem Relation Age of Onset   • Breast Cancer Mother 57        no genetic markers   • Other Mother         MS, fibroids/hysterectomy   • Cancer Maternal Grandmother          uterine or fibrioids?, breast, skin, lung   • Lung Disease Maternal Grandmother         emphysema   • Psychiatric Illness Maternal Grandmother         suicide   • GI Disease Maternal Grandfather         Crohn's   • GI Disease Maternal Aunt         Crohn's         Medications:   Outpatient Medications Marked as Taking for the 6/17/21 encounter (Office Visit) with Godfrey Vigil M.D.   Medication Sig Dispense Refill   • meloxicam (MOBIC) 15 MG tablet Take 1 tablet by mouth 1 time a day as needed (pain). Take with food. Do not take with other NSAIDs 14 tablet 0   • ethinyl estradiol-etonogestrel (NUVARING) 0.12-0.015 MG/24HR vaginal ring 1 ring PV x3wk, off x1wk. 3 Each 3   • inFLIXimab (REMICADE) 100 MG Recon Soln Infuse  into a venous catheter.     • Adapalene 0.3 % Gel      • Omega-3 Fatty Acids (FISH OIL) 1000 MG Cap capsule Take 1,000 mg by mouth every day.     • therapeutic multivitamin-minerals (THERAGRAN-M) Tab Take 1 Tab by mouth every day.     • Probiotic Product (PROBIOTIC-10 PO) Take  by mouth.          Current Outpatient Medications on File Prior to Visit   Medication Sig Dispense Refill   • ethinyl estradiol-etonogestrel (NUVARING) 0.12-0.015 MG/24HR vaginal ring 1 ring PV x3wk, off x1wk. 3 Each 3   • inFLIXimab (REMICADE) 100 MG Recon Soln Infuse  into a venous catheter.     • Adapalene 0.3 % Gel      • Omega-3 Fatty Acids (FISH OIL) 1000 MG Cap capsule Take 1,000 mg by mouth every day.     • therapeutic multivitamin-minerals (THERAGRAN-M) Tab Take 1 Tab by mouth every day.     • Probiotic Product (PROBIOTIC-10 PO) Take  by mouth.     • tizanidine (ZANAFLEX) 4 MG Tab Take 1 tablet by mouth at bedtime as needed (muscle spasms). (Patient not taking: Reported on 6/17/2021) 30 tablet 2     No current facility-administered medications on file prior to visit.         Allergies:   Allergies   Allergen Reactions   • Sumatriptan Shortness of Breath   • Infliximab      15min into infusion at 10cc/hr, pt  reported chest tightness/ ticklish feeling and cough. Infusion stopped. O2 supplement/Benadryl /albuterol administered. Monitored for about 2hrs and discharged in stable condition. VSS throughout the treatment.    • Cedarwood Oil    • Grass Extracts [Gramineae Pollens]    • Lidocaine      Nasal spray causes fainting (dental shots were okay)   • Poison Pablo Extract [Extract Of Poison Pablo]    • Pollen Extract    • Prochlorperazine      distonic   • Promethazine      distonic   • Zolmitriptan      sleepy   • Azathioprine Itching and Rash       Social Hx:   Social History     Socioeconomic History   • Marital status:      Spouse name: Not on file   • Number of children: Not on file   • Years of education: Not on file   • Highest education level: Not on file   Occupational History   • Occupation: pharmceutical      Comment: Prior job   Tobacco Use   • Smoking status: Never Smoker   • Smokeless tobacco: Never Used   Vaping Use   • Vaping Use: Never used   Substance and Sexual Activity   • Alcohol use: Yes     Comment: once a week   • Drug use: Yes     Frequency: 1.0 times per week     Types: Marijuana     Comment: past hallucinogens, smoking (vape)   • Sexual activity: Yes     Partners: Female   Other Topics Concern   •  Service No   • Blood Transfusions No   • Caffeine Concern No   • Occupational Exposure No   • Hobby Hazards No   • Sleep Concern No   • Stress Concern No   • Weight Concern No   • Special Diet Yes   • Back Care No   • Exercise Yes   • Bike Helmet No   • Seat Belt Yes   • Self-Exams No   Social History Narrative   • Not on file     Social Determinants of Health     Financial Resource Strain:    • Difficulty of Paying Living Expenses:    Food Insecurity:    • Worried About Running Out of Food in the Last Year:    • Ran Out of Food in the Last Year:    Transportation Needs:    • Lack of Transportation (Medical):    • Lack of Transportation (Non-Medical):    Physical Activity:    • Days  "of Exercise per Week:    • Minutes of Exercise per Session:    Stress:    • Feeling of Stress :    Social Connections:    • Frequency of Communication with Friends and Family:    • Frequency of Social Gatherings with Friends and Family:    • Attends Faith Services:    • Active Member of Clubs or Organizations:    • Attends Club or Organization Meetings:    • Marital Status:    Intimate Partner Violence:    • Fear of Current or Ex-Partner:    • Emotionally Abused:    • Physically Abused:    • Sexually Abused:          EXAMINATION     Physical Exam:   Vitals: /76 (BP Location: Left arm, Patient Position: Sitting, BP Cuff Size: Adult)   Pulse 93   Temp 36.7 °C (98 °F) (Temporal)   Ht 1.626 m (5' 4\")   Wt 81 kg (178 lb 9.2 oz)   SpO2 97%     Constitutional:   Body Habitus: Body mass index is 30.65 kg/m².  Cooperation: Fully cooperates with exam  Appearance: Well-groomed no disheveled    Respiratory-  breathing comfortable on room air, no audible wheezing  Cardiovascular- capillary refills less than 2 seconds. No lower extremity edema is noted.   Psychiatric- alert and oriented ×3. Normal affect.    MSK and Neuro: -    There are no signs of infection around the injection sites.   decreased active range of motion with flexion, lateral flexion, and rotation bilaterally.   There is decreased active range of motion with lumbar extension.      Palpation:   No tenderness to palpation in midline at T1-T12 levels. No tenderness to palpation in the left and right of the midline T1-L5, NEGATIVE for tenderness to palpation to the para-midline region in the lower lumbar levels.  palpation over SI joint: negative bilaterally    palpation in hip or over the gluteus medius tendon insertion: negative bilaterally      Lumbar spine Special tests  Neuro tension  Straight leg test mildly positive right, negative left    Slump test mildly positive right, negative left      Key points for the international standards for " neurological classification of spinal cord injury (ISNCSCI) to light touch.     Dermatome R L                                      L2 2 2   L3 2 2   L4 2 2   L5 2 2   S1 2 2   S2 2 2         Motor Exam Lower Extremities    ? Myotome R L   Hip flexion L2 5 5   Knee extension L3 5 5   Ankle dorsiflexion L4 5 5   Toe extension L5 5 5   Ankle plantarflexion S1 5 5                 MEDICAL DECISION MAKING    DATA    Labs:   Lab Results   Component Value Date/Time    SODIUM 135 05/06/2021 02:39 PM    POTASSIUM 3.9 05/06/2021 02:39 PM    CHLORIDE 102 05/06/2021 02:39 PM    CO2 23 05/06/2021 02:39 PM    GLUCOSE 76 05/06/2021 02:39 PM    BUN 11 05/06/2021 02:39 PM    CREATININE 0.69 05/06/2021 02:39 PM        No results found for: PROTHROMBTM, INR     Lab Results   Component Value Date/Time    WBC 8.3 05/06/2021 02:39 PM    RBC 4.81 05/06/2021 02:39 PM    HEMOGLOBIN 15.1 05/06/2021 02:39 PM    HEMATOCRIT 44.2 05/06/2021 02:39 PM    MCV 91.9 05/06/2021 02:39 PM    MCH 31.4 05/06/2021 02:39 PM    MCHC 34.2 05/06/2021 02:39 PM    MPV 10.6 05/06/2021 02:39 PM    NEUTSPOLYS 60.40 05/06/2021 02:39 PM    LYMPHOCYTES 27.10 05/06/2021 02:39 PM    MONOCYTES 5.20 05/06/2021 02:39 PM    EOSINOPHILS 5.80 05/06/2021 02:39 PM    BASOPHILS 1.10 05/06/2021 02:39 PM        No results found for: HBA1C       Imaging:   I personally reviewed following images  MRI Lumbar spine 4/14/2021  There was a small high intensity zone in the right paracentric region consistent with an annular tear and a small disc herniation.  No significant neuroforaminal or central canal stenosis.  However annular tears can cause a chemical neuritis.    I reviewed the following radiology reports                     Results for orders placed in visit on 04/14/21    MR-LUMBAR SPINE-W/O    Impression  1.  L4-5 disc bulge with small right paracentral disc protrusion and tiny tear of the annulus fibrosis. No significant central canal or neural foraminal narrowing.    2.   Pelvic mass likely representing a retroverted uterus with multiple uterine fibroids.        Results for orders placed in visit on 21    MR-LUMBAR SPINE-W/O    Impression  1.  L4-5 disc bulge with small right paracentral disc protrusion and tiny tear of the annulus fibrosis. No significant central canal or neural foraminal narrowing.    2.  Pelvic mass likely representing a retroverted uterus with multiple uterine fibroids.                                                                                                                Results for orders placed during the hospital encounter of 21    DX-LUMBAR SPINE-2 OR 3 VIEWS    Impression  1.  Normal lumbar spine series.                         DIAGNOSIS   Visit Diagnoses     ICD-10-CM   1. Lumbar radiculopathy  M54.16   2. Lumbar disc herniation  M51.26   3. Chronic right-sided low back pain with right-sided sciatica  M54.41    G89.29         ASSESSMENT and PLAN:     Magaly Guerinmanuel Lynch  1985 female      Magaly was seen today for follow-up.    Diagnoses and all orders for this visit:    Lumbar radiculopathy  -     meloxicam (MOBIC) 15 MG tablet; Take 1 tablet by mouth 1 time a day as needed (pain). Take with food. Do not take with other NSAIDs  -     REFERRAL TO PHYSICAL THERAPY    Lumbar disc herniation  -     meloxicam (MOBIC) 15 MG tablet; Take 1 tablet by mouth 1 time a day as needed (pain). Take with food. Do not take with other NSAIDs  -     REFERRAL TO PHYSICAL THERAPY    Chronic right-sided low back pain with right-sided sciatica  -     meloxicam (MOBIC) 15 MG tablet; Take 1 tablet by mouth 1 time a day as needed (pain). Take with food. Do not take with other NSAIDs  -     REFERRAL TO PHYSICAL THERAPY        Patient initially had excellent pain relief after the epidural steroid injection for approximately 1 month with a significant functional improvement as well.  She has had a recurrence of this pain and now is unable to do her home  exercise program and has difficulty with ADLs because of this pain.    I have ordered a right L4-5 transforaminal epidural steroid injection for acute pain    The risks benefits and alternatives to this procedure were discussed and the patient wishes to proceed with the procedure. Risks include but are not limited to damage to surrounding structures, infection, bleeding, worsening of pain which can be permanent, weakness which can be permanent. Benefits include pain relief, improved function. Alternatives includes not doing the procedure.        Follow up: After the above procedure    Thank you for allowing me to participate in the care of this patient. If you have any questions please not hesitate to contact me.             Please note that this dictation was created using voice recognition software. I have made every reasonable attempt to correct obvious errors but there may be errors of grammar and content that I may have overlooked prior to finalization of this note.      Godfrey Vigil MD  Interventional Spine and Sports Physiatry  Physical Medicine and Rehabilitation  [facility]

## 2021-07-20 ENCOUNTER — PHYSICAL THERAPY (OUTPATIENT)
Dept: PHYSICAL THERAPY | Facility: REHABILITATION | Age: 36
End: 2021-07-20
Attending: PHYSICAL MEDICINE & REHABILITATION
Payer: COMMERCIAL

## 2021-07-20 DIAGNOSIS — M51.26 LUMBAR DISC HERNIATION: ICD-10-CM

## 2021-07-20 DIAGNOSIS — G89.29 CHRONIC RIGHT-SIDED LOW BACK PAIN WITH RIGHT-SIDED SCIATICA: ICD-10-CM

## 2021-07-20 DIAGNOSIS — M54.41 CHRONIC RIGHT-SIDED LOW BACK PAIN WITH RIGHT-SIDED SCIATICA: ICD-10-CM

## 2021-07-20 DIAGNOSIS — M54.16 LUMBAR RADICULOPATHY: ICD-10-CM

## 2021-07-20 PROCEDURE — 97012 MECHANICAL TRACTION THERAPY: CPT

## 2021-07-20 PROCEDURE — 97161 PT EVAL LOW COMPLEX 20 MIN: CPT

## 2021-07-20 ASSESSMENT — ENCOUNTER SYMPTOMS
PAIN SCALE: 4
QUALITY: ACHING
PAIN SCALE AT HIGHEST: 9
EXACERBATED BY: ACTIVITY
ALLEVIATING FACTORS: COLD/ICE
PAIN TIMING: IN THE EVENING
QUALITY: BURNING

## 2021-07-20 NOTE — OP THERAPY EVALUATION
Outpatient Physical Therapy  INITIAL EVALUATION    [facility] Physical Therapy [address]  [address]  [place] [address]  Phone:  [phone]  Fax:  [phone]    Date of Evaluation: 2021    Patient: Mary Lynch  YOB: 1985  MRN: [id number]     Referring Provider: Godfrey Vigil M.D.  [address]  [address]  [place] [address]   Referring Diagnosis Lumbar radiculopathy [M54.16];Lumbar disc herniation [M51.26];Chronic right-sided low back pain with right-sided sciatica [M54.41, G89.29]     Time Calculation  Start time: 1115  Stop time: 1208 Time Calculation (min): 53 minutes         Chief Complaint: No chief complaint on file.    Visit Diagnoses     ICD-10-CM   1. Lumbar radiculopathy  M54.16   2. Lumbar disc herniation  M51.26   3. Chronic right-sided low back pain with right-sided sciatica  M54.41    G89.29       Date of onset of impairment: 2020    Subjective:   History of Present Illness:     Mechanism of injury:  Patient is a 35 year old female previously known to this therapist who presents with complaints of LBP.       Patient had an injection on 21. Patient reports the pain has changed over the interim. She has pain in her back and down L4-L5 dermatome.      Patient reports she continues to walk 2-3 miles a few times a week and does her extension.    Pain:     Current pain ratin    At worst pain ratin    Quality:  Burning and aching    Pain timing:  In the evening    Relieving factors:  Cold/ice    Aggravating factors:  Activity (lifting, twisting and slumping )  Social Support:     Lives in:  One-story house    Lives with:  Spouse  Diagnostic Tests:     Diagnostic Tests Comments:  2021 7:23 AM     HISTORY/REASON FOR EXAM: Severe chronic back pain.        TECHNIQUE/EXAM DESCRIPTION:  MRI of the lumbar spine without contrast.     The study was performed on a Siemens Skyra 3.0 Jeanne MRI scanner. T1 sagittal, T2 sagittal, and  T2 axial images were obtained of the lumbar spine.     COMPARISON: None.     FINDINGS:  Vertebral body height and alignment is well maintained throughout.  No evidence of marrow edema or fracture.  There is loss of the normal bright T2 disc signal at L4-5.  No spinal canal masses seen.  The conus is located normally at T12-L1.  There is a mass seen within the pelvis likely representing a retroverted uterus with multiple uterine fibroids.     Findings at specific levels:     T12-L1: No significant central canal or neuroforaminal narrowing.     L1-2: No significant central canal or neuroforaminal narrowing.     L2-3: No significant central canal or neuroforaminal narrowing.     L3-4: No significant central canal or neuroforaminal narrowing.     L4-5: There is annular disc bulge with a small right paracentral disc protrusion. There is mild bilateral facet degeneration. No central canal narrowing. There is no significant neural foraminal narrowing. There is a tiny tear of the annulus fibrosis.     L5-S1: No significant central canal or neuroforaminal narrowing.     IMPRESSION:     1.  L4-5 disc bulge with small right paracentral disc protrusion and tiny tear of the annulus fibrosis. No significant central canal or neural foraminal narrowing.     2.  Pelvic mass likely representing a retroverted uterus with multiple uterine fibroids.  Treatments:     Previous treatment:  Injection treatment and physical therapy  Activities of Daily Living:     Patient reported ADL status: Patient is currently working from home.    Patient Goals:     Patient goals for therapy:  Decreased pain and return to sport/leisure activities      Past Medical History:   Diagnosis Date   • Crohn's disease (HCC) 20111   • Hip pain, bilateral    • Kidney stone 2008 to 2015    4x (mostly on right side).    • Migraines 1998   • MRSA infection     sinus infection   • Ovarian cyst     age 18, burst     Past Surgical History:   Procedure Laterality Date   •  LUMBAR TRANSFORAMINAL EPIDURAL STEROID INJECTION Right 5/17/2021    Procedure: RIGHT L4-5 transforaminal epidural steroid injection with sedation;  Surgeon: Godfrey Vigil M.D.;  [facility];  Service: Pain Management   • DENTAL EXTRACTION(S)       Social History     Tobacco Use   • Smoking status: Never Smoker   • Smokeless tobacco: Never Used   Substance Use Topics   • Alcohol use: Yes     Comment: once a week     Family and Occupational History     Socioeconomic History   • Marital status:      Spouse name: Not on file   • Number of children: Not on file   • Years of education: Not on file   • Highest education level: Not on file   Occupational History   • Occupation: pharmceutical      Comment: Prior job       Objective     Neurological Testing     Reflexes   Left   Patellar (L4): normal (2+)  Achilles (S1): normal (2+)    Right   Patellar (L4): normal (2+)  Achilles (S1): normal (2+)    Myotome testing   Lumbar (left)   L3 (knee extensors): 4-  L4 (ankle dorsiflexors): 3+        Dermatome testing   Lumbar (left)   All left lumbar dermatomes intact    Lumbar (right)   All right lumbar dermatomes intact    Active Range of Motion     Lumbar   All lumbar active range of motion within functional limits    Tests       Lumbar spine (right)     Positive slump.     Right Hip   SLR: Positive.         Therapeutic Treatments and Modalities:     1. Mechanical Traction (CPT 59470), 80/60#   60 / 20 sec with heat x 1 5min     Time-based treatments/modalities:           Assessment, Response and Plan:   Impairments: activity intolerance, impaired physical strength, lacks appropriate home exercise program and pain with function    Assessment details:  Patient is a 35 year old female with complaints of low back pain and radicular symptoms who presents to outpatient PT for advice with activity modification and return to exercise. Patient would benefit from skilled PT with a focus on the  deficits above to decrease pain and to improve quality of life.   Barriers to therapy:  None  Prognosis: fair    Goals:   Short Term Goals:   1. Patient will be independent in HEP with written instructions.   Short term goal time span:  2-4 weeks      Long Term Goals:    1. Patient will score <25 on the RMQ.   2. Patient will be able to participate in exercise program including moderate exercise without an increase in pain.    3. Patient will be independent in upgraded HEP with written instructions.   Long term goal time span:  6-8 weeks    Plan:   Therapy options:  Physical therapy treatment to continue  Planned therapy interventions:  Neuromuscular Re-education (CPT 43174), Mechanical Traction (CPT 00913), E Stim Unattended (CPT 60488), Therapeutic Activities (CPT 45924) and Therapeutic Exercise (CPT 31811)  Frequency:  2x week  Duration in weeks:  8  Discussed with:  Patient      Functional Assessment Used  Patrick Jamal Low Back Pain and Disability Score: 37.5     Referring provider co-signature:  I have reviewed this plan of care and my co-signature certifies the need for services.    Certification Period: 07/20/2021 to  09/14/21    Physician Signature: ________________________________ Date: ______________

## 2021-07-27 ENCOUNTER — PHYSICAL THERAPY (OUTPATIENT)
Dept: PHYSICAL THERAPY | Facility: MEDICAL CENTER | Age: 36
End: 2021-07-27
Attending: PHYSICAL MEDICINE & REHABILITATION
Payer: COMMERCIAL

## 2021-07-27 DIAGNOSIS — M51.26 LUMBAR DISC HERNIATION: ICD-10-CM

## 2021-07-27 DIAGNOSIS — M54.16 LUMBAR RADICULOPATHY: ICD-10-CM

## 2021-07-27 PROCEDURE — 97110 THERAPEUTIC EXERCISES: CPT

## 2021-07-27 PROCEDURE — 97012 MECHANICAL TRACTION THERAPY: CPT

## 2021-07-27 NOTE — OP THERAPY DAILY TREATMENT
Outpatient Physical Therapy  DAILY TREATMENT     [facility] Physical Therapy  [address]  [place] [address]  Phone:  [phone]  Fax:  [phone]    Date: 07/27/2021    Patient: Mary Lynch  YOB: 1985  MRN: [id number]     Time Calculation    Start time: 1050  Stop time: 1145 Time Calculation (min): 55 minutes         Chief Complaint: No chief complaint on file.    Visit #: 2    SUBJECTIVE:  I was really sore after the traction, but the next day I felt really good. I have been doing my moves.  I switched the exercise to the fire hydrant. I got the massage and did a psoas stretch and if felt great.  I had a full day of relief.    OBJECTIVE:  Current objective measures:           Therapeutic Exercises (CPT 34338):     1. Bent knee fall outs , with biofeedback     2. Supine leg extension     3. Bridge on swiss ball     4. Psoas relief       Therapeutic Exercise Summary: Access Code: [email or web address]  URL: [email or web address]  Date: 07/27/2021  Prepared by: Tammie Torres    Exercises  Psoas Mobilization with Small Ball - 1 x daily - 7 x weekly - 3 sets - 10 reps  Modified Sae Stretch - 1 x daily - 7 x weekly - 2 sets - 30 hold      Therapeutic Treatments and Modalities:     1. Mechanical Traction (CPT 50008), 80/60#   60 / 20 sec with heat x 1 5min     Time-based treatments/modalities:    Physical Therapy Timed Treatment Charges  Therapeutic exercise minutes (CPT 24278): 42 minutes        ASSESSMENT:   Response to treatment: Good tolerance of ther ex. Tight psoas noted and poor multifidus injury.      PLAN/RECOMMENDATIONS:   Plan for treatment: therapy treatment to continue next visit.  Planned interventions for next visit: continue with current treatment.

## 2021-07-29 ENCOUNTER — PHYSICAL THERAPY (OUTPATIENT)
Dept: PHYSICAL THERAPY | Facility: MEDICAL CENTER | Age: 36
End: 2021-07-29
Attending: PHYSICAL MEDICINE & REHABILITATION
Payer: COMMERCIAL

## 2021-07-29 DIAGNOSIS — M51.26 LUMBAR DISC HERNIATION: ICD-10-CM

## 2021-07-29 DIAGNOSIS — M54.16 LUMBAR RADICULOPATHY: ICD-10-CM

## 2021-07-29 PROCEDURE — 97014 ELECTRIC STIMULATION THERAPY: CPT

## 2021-07-29 PROCEDURE — 97110 THERAPEUTIC EXERCISES: CPT

## 2021-07-29 NOTE — OP THERAPY DAILY TREATMENT
Outpatient Physical Therapy  DAILY TREATMENT     [facility]  [address]  [place] [address]  Phone:  [phone]  Fax:  [phone]    Date: 07/29/2021    Patient: Mary Lynch  YOB: 1985  MRN: [id number]     Time Calculation    Start time: 1045  Stop time: 1145 Time Calculation (min): 60 minutes         Chief Complaint: No chief complaint on file.    Visit #: 3    SUBJECTIVE:  I felt pretty good after last treatment. I felt really good after last treatment and the day after. I have been walking every day, but since the smoke I have not been doing too much.    My PT goal is to run and to do yoga. I have been doing some gentle yoga    OBJECTIVE:  Current objective measures:           Therapeutic Exercises (CPT 33487):     1. Bent knee fall outs , with biofeedback     2. Supine leg extension     3. Bridge on swiss ball     4. Psoas relief     5. Walking/ running on treadmill , 3.0 - 4.2 mph with 45 sec recovery x 4 reps, education regarding forward forces and lew    6. Quadriped leg extension , 10x each       Therapeutic Exercise Summary: Access Code: [email or web address]  URL: [email or web address]  Date: 07/27/2021  Prepared by: Tammie Torres    Exercises  Psoas Mobilization with Small Ball - 1 x daily - 7 x weekly - 3 sets - 10 reps  Modified Sae Stretch - 1 x daily - 7 x weekly - 2 sets - 30 hold      Therapeutic Treatments and Modalities:     1. Mechanical Traction (CPT 19369), 80/60#   60 / 20 sec with heat x 1 5min     Time-based treatments/modalities:    Physical Therapy Timed Treatment Charges  Therapeutic exercise minutes (CPT 80843): 43 minutes      ASSESSMENT:   Response to treatment: Patient tolerated treatment well. Will continue to progress activity and return to exercise as tolerated.        PLAN/RECOMMENDATIONS:   Plan for treatment: therapy treatment to continue next visit.  Planned interventions for next visit:  continue with current treatment.

## 2021-08-03 ENCOUNTER — APPOINTMENT (OUTPATIENT)
Dept: PHYSICAL THERAPY | Facility: MEDICAL CENTER | Age: 36
End: 2021-08-03
Payer: COMMERCIAL

## 2021-08-09 ENCOUNTER — PHYSICAL THERAPY (OUTPATIENT)
Dept: PHYSICAL THERAPY | Facility: MEDICAL CENTER | Age: 36
End: 2021-08-09
Attending: PHYSICAL MEDICINE & REHABILITATION
Payer: COMMERCIAL

## 2021-08-09 DIAGNOSIS — M54.16 LUMBAR RADICULOPATHY: ICD-10-CM

## 2021-08-09 DIAGNOSIS — M51.26 LUMBAR DISC HERNIATION: ICD-10-CM

## 2021-08-09 DIAGNOSIS — G89.29 CHRONIC RIGHT-SIDED LOW BACK PAIN WITH RIGHT-SIDED SCIATICA: ICD-10-CM

## 2021-08-09 DIAGNOSIS — M54.41 CHRONIC RIGHT-SIDED LOW BACK PAIN WITH RIGHT-SIDED SCIATICA: ICD-10-CM

## 2021-08-09 PROCEDURE — 97012 MECHANICAL TRACTION THERAPY: CPT

## 2021-08-09 PROCEDURE — 97110 THERAPEUTIC EXERCISES: CPT

## 2021-08-09 NOTE — OP THERAPY DAILY TREATMENT
Outpatient Physical Therapy  DAILY TREATMENT     [facility] Outpatient Physical Therapy  [address]  [place] [address]  Phone:  [phone]  Fax:  [phone]    Date: 08/09/2021    Patient: Mary Lynch  YOB: 1985  MRN: [id number]     Time Calculation    Start time: 1045  Stop time: 1130 Time Calculation (min): 45 minutes         Chief Complaint: No chief complaint on file.    Visit #: 4    SUBJECTIVE:  I am feeling really good. I did a little more running with few repeats and I am doing good. I have had way less pain in the last few weeks than I have the last 8 months.  I have also had almost no periods of numbness.      OBJECTIVE:  Current objective measures:           Therapeutic Exercises (CPT 12571):     1. Bent knee fall outs , with biofeedback     2. Supine leg extension     3. Bridge on swiss ball , 20 sec holds x 10 reps     4. Psoas relief     5. Walking/ running on treadmill , 3.0 - 4.2 mph with 45 sec recovery x 4 reps, education regarding forward forces and lew    6. Quadriped bird/ dog     7. Bridge plus marching with stick on perlvis for stability       Therapeutic Exercise Summary: Access Code: [email or web address]  URL: [email or web address]  Date: 07/27/2021  Prepared by: Tammie Torres    Exercises  Psoas Mobilization with Small Ball - 1 x daily - 7 x weekly - 3 sets - 10 reps  Modified Sae Stretch - 1 x daily - 7 x weekly - 2 sets - 30 hold      Therapeutic Treatments and Modalities:     1. Mechanical Traction (CPT 05651), 80/60#   60 / 20 sec with heat x 1 5min     Time-based treatments/modalities:    Physical Therapy Timed Treatment Charges  Therapeutic exercise minutes (CPT 17542): 30 minutes        ASSESSMENT:   Response to treatment: Patient is reporting she is doing well overall.     PLAN/RECOMMENDATIONS:   Plan for treatment: therapy treatment to continue next visit.  Planned interventions for next visit: continue with current  treatment.

## 2021-08-11 ENCOUNTER — PHYSICAL THERAPY (OUTPATIENT)
Dept: PHYSICAL THERAPY | Facility: MEDICAL CENTER | Age: 36
End: 2021-08-11
Attending: PHYSICAL MEDICINE & REHABILITATION
Payer: COMMERCIAL

## 2021-08-11 DIAGNOSIS — G89.29 CHRONIC RIGHT-SIDED LOW BACK PAIN WITH RIGHT-SIDED SCIATICA: ICD-10-CM

## 2021-08-11 DIAGNOSIS — M54.41 CHRONIC RIGHT-SIDED LOW BACK PAIN WITH RIGHT-SIDED SCIATICA: ICD-10-CM

## 2021-08-11 DIAGNOSIS — M51.26 LUMBAR DISC HERNIATION: ICD-10-CM

## 2021-08-11 DIAGNOSIS — M54.16 LUMBAR RADICULOPATHY: ICD-10-CM

## 2021-08-11 PROCEDURE — 97110 THERAPEUTIC EXERCISES: CPT

## 2021-08-11 PROCEDURE — 97012 MECHANICAL TRACTION THERAPY: CPT

## 2021-08-11 NOTE — OP THERAPY DAILY TREATMENT
Outpatient Physical Therapy  DAILY TREATMENT     [facility] Outpatient Physical Therapy  [address]  [place] [address]  Phone:  [phone]  Fax:  [phone]    Date: 08/11/2021    Patient: Mary Lynch  YOB: 1985  MRN: [id number]     Time Calculation    Start time: 1046  Stop time: 1140 Time Calculation (min): 54 minutes         Chief Complaint: No chief complaint on file.    Visit #: 5    SUBJECTIVE:  I am feeling pretty good. I am having soreness on my lateral abs from the exercises you gave me. I moved a nightstand and I stopped part way through. I think the mental retraining is part of it.        OBJECTIVE:  Current objective measures:           Therapeutic Exercises (CPT 58298):     1. Supine obliques with pink tband , 20x     2. Quadriped resisted shoulder flexion, 20x     3. Bridge on swiss ball , 20 sec holds x 10 reps     4. Psoas stretch     5. Walking/ running on treadmill , 17 min on treadmill with 3 sections of 2 minutes of running     6. Tall kneel slide backs with shoulder abduction     7. Bridge plus marching with stick on perlvis for stability       Therapeutic Exercise Summary: Access Code: [email or web address]  URL: [email or web address]  Date: 07/27/2021  Prepared by: Tammie Torres    Exercises  Psoas Mobilization with Small Ball - 1 x daily - 7 x weekly - 3 sets - 10 reps  Modified Sae Stretch - 1 x daily - 7 x weekly - 2 sets - 30 hold      Therapeutic Treatments and Modalities:     1. Mechanical Traction (CPT 74837), 80/60#   60 / 20 sec with heat x 1 5min     Time-based treatments/modalities:    Physical Therapy Timed Treatment Charges  Therapeutic exercise minutes (CPT 81395): 40 minutes      ASSESSMENT:   Response to treatment: Patient is progressing very well. Improved lumbar stability noted and improved function with loading.  Continue to progress as tolerated.     PLAN/RECOMMENDATIONS:   Plan for treatment: therapy treatment to  continue next visit.  Planned interventions for next visit: continue with current treatment.

## 2021-08-13 ENCOUNTER — GYNECOLOGY VISIT (OUTPATIENT)
Dept: OBGYN | Facility: CLINIC | Age: 36
End: 2021-08-13
Payer: COMMERCIAL

## 2021-08-13 ENCOUNTER — HOSPITAL ENCOUNTER (OUTPATIENT)
Facility: MEDICAL CENTER | Age: 36
End: 2021-08-13
Attending: OBSTETRICS & GYNECOLOGY
Payer: COMMERCIAL

## 2021-08-13 VITALS — WEIGHT: 179 LBS | SYSTOLIC BLOOD PRESSURE: 131 MMHG | BODY MASS INDEX: 30.73 KG/M2 | DIASTOLIC BLOOD PRESSURE: 78 MMHG

## 2021-08-13 DIAGNOSIS — Z11.51 SCREENING FOR HUMAN PAPILLOMAVIRUS (HPV): ICD-10-CM

## 2021-08-13 DIAGNOSIS — Z12.4 SCREENING FOR CERVICAL CANCER: ICD-10-CM

## 2021-08-13 DIAGNOSIS — Z01.419 WELL WOMAN EXAM WITH ROUTINE GYNECOLOGICAL EXAM: ICD-10-CM

## 2021-08-13 DIAGNOSIS — Z30.017 ENCOUNTER FOR INITIAL PRESCRIPTION OF IMPLANTABLE SUBDERMAL CONTRACEPTIVE: ICD-10-CM

## 2021-08-13 PROCEDURE — 88175 CYTOPATH C/V AUTO FLUID REDO: CPT

## 2021-08-13 PROCEDURE — 87624 HPV HI-RISK TYP POOLED RSLT: CPT

## 2021-08-13 PROCEDURE — 99385 PREV VISIT NEW AGE 18-39: CPT | Performed by: OBSTETRICS & GYNECOLOGY

## 2021-08-13 ASSESSMENT — FIBROSIS 4 INDEX: FIB4 SCORE: 0.61

## 2021-08-13 NOTE — NON-PROVIDER
Pt here for new pt appt  Pt states she has a history of fibroids  Pharmacy verified  Good #: [phone] (home)   LMP: 07/24/21  PAP: 2021 normal  BC: Nuva ring

## 2021-08-14 NOTE — PROGRESS NOTES
Subjective     Mary Lynch is a 35 y.o. female who presents with New Patient (Fibroids)        CC: annual exam, discuss fibroids    HPI: 35-year-old  0, last menstrual period 2021, using NuvaRing for cycle control, presents for annual exam.  The patient has history of a fibroid uterus, and chronic pain associated with it.  Currently menses are every 28 days, lasting 5 days, and described as heavy.  The patient has pain associated with her menses and off of her menses.  She also has history of Crohn's disease and kidney stones.  She rates her pain a 7-8 out of 10 intensity at its worst, 5-6 out of 10 intensity at baseline.  The patient also reports pain with intercourse.  Past medical history is remarkable for migraine with aura.    She is currently  to a female, and they have 1 child.  She has never wanted to be pregnant, and never plans to be pregnant.  Her partner is proceeding with IVF for 1 more child.  The patient has tried progestin management of her cycles with norethindrone, but it made her very emotional.  She has history of abnormal Pap smear in 2018 with no treatment.  No history of other STIs.    Past Medical History:   Diagnosis Date   • Cancer (HCC)    • Crohn's disease (HCC)    • Hip pain, bilateral    • Hypertension    • IBD (inflammatory bowel disease)    • Kidney stone  to     4x (mostly on right side).    • Migraines    • MRSA infection     sinus infection   • Ovarian cyst     age 18, burst       Past Surgical History:   Procedure Laterality Date   • LUMBAR TRANSFORAMINAL EPIDURAL STEROID INJECTION Right 2021    Procedure: RIGHT L4-5 transforaminal epidural steroid injection with sedation;  Surgeon: Godfrey Vigil M.D.;  [facility];  Service: Pain Management   • DENTAL EXTRACTION(S)           Current Outpatient Medications:   •  tizanidine (ZANAFLEX) 4 MG Tab, Take 1 tablet by mouth at bedtime as needed (muscle  spasms)., Disp: 30 tablet, Rfl: 2  •  ethinyl estradiol-etonogestrel (NUVARING) 0.12-0.015 MG/24HR vaginal ring, 1 ring PV x3wk, off x1wk., Disp: 3 Each, Rfl: 3  •  inFLIXimab (REMICADE) 100 MG Recon Soln, Infuse  into a venous catheter., Disp: , Rfl:   •  Adapalene 0.3 % Gel, , Disp: , Rfl:   •  Omega-3 Fatty Acids (FISH OIL) 1000 MG Cap capsule, Take 1,000 mg by mouth every day., Disp: , Rfl:   •  therapeutic multivitamin-minerals (THERAGRAN-M) Tab, Take 1 Tab by mouth every day., Disp: , Rfl:   •  Probiotic Product (PROBIOTIC-10 PO), Take  by mouth., Disp: , Rfl:   •  meloxicam (MOBIC) 15 MG tablet, Take 1 tablet by mouth 1 time a day as needed (pain). Take with food. Do not take with other NSAIDs (Patient not taking: Reported on 8/13/2021), Disp: 14 tablet, Rfl: 0    Sumatriptan, Infliximab, Cedarwood oil, Grass extracts [gramineae pollens], Lidocaine, Poison oak extract [extract of poison oak], Pollen extract, Prochlorperazine, Promethazine, Zolmitriptan, and Azathioprine    Family History   Problem Relation Age of Onset   • Breast Cancer Mother 57        no genetic markers   • Other Mother         MS, fibroids/hysterectomy   • Cancer Maternal Grandmother         uterine or fibrioids?, breast, skin, lung   • Lung Disease Maternal Grandmother         emphysema   • Psychiatric Illness Maternal Grandmother         suicide   • GI Disease Maternal Grandfather         Crohn's   • GI Disease Maternal Aunt         Crohn's       Social History     Socioeconomic History   • Marital status:      Spouse name: Not on file   • Number of children: Not on file   • Years of education: Not on file   • Highest education level: Not on file   Occupational History   • Occupation: pharmceutical      Comment: Prior job   Tobacco Use   • Smoking status: Never Smoker   • Smokeless tobacco: Never Used   Vaping Use   • Vaping Use: Never used   Substance and Sexual Activity   • Alcohol use: Yes     Comment: once a week   •  Drug use: Yes     Frequency: 1.0 times per week     Types: Marijuana     Comment: past hallucinogens, smoking (vape)   • Sexual activity: Yes     Partners: Female   Other Topics Concern   •  Service No   • Blood Transfusions No   • Caffeine Concern No   • Occupational Exposure No   • Hobby Hazards No   • Sleep Concern No   • Stress Concern No   • Weight Concern No   • Special Diet Yes   • Back Care No   • Exercise Yes   • Bike Helmet No   • Seat Belt Yes   • Self-Exams No   Social History Narrative   • Not on file     Social Determinants of Health     Financial Resource Strain:    • Difficulty of Paying Living Expenses:    Food Insecurity:    • Worried About Running Out of Food in the Last Year:    • Ran Out of Food in the Last Year:    Transportation Needs:    • Lack of Transportation (Medical):    • Lack of Transportation (Non-Medical):    Physical Activity:    • Days of Exercise per Week:    • Minutes of Exercise per Session:    Stress:    • Feeling of Stress :    Social Connections:    • Frequency of Communication with Friends and Family:    • Frequency of Social Gatherings with Friends and Family:    • Attends Orthodoxy Services:    • Active Member of Clubs or Organizations:    • Attends Club or Organization Meetings:    • Marital Status:    Intimate Partner Violence:    • Fear of Current or Ex-Partner:    • Emotionally Abused:    • Physically Abused:    • Sexually Abused:        OB History    Para Term  AB Living   0 0 0 0 0 0   SAB TAB Ectopic Molar Multiple Live Births   0 0 0 0 0 0       ROS - REVIEW OF SYSTEMS:    Pertinent positives and negatives mentioned in HPI.    All other systems reviewed and are negative or noncontributory.         Objective     /78   Wt 81.2 kg (179 lb)   LMP 2021   BMI 30.73 kg/m²      Physical Exam      GENERAL: Alert, in no apparent distress  PSYCHIATRIC: Appropriate affect, intact insight and judgement.  NECK:  Nontender, no masses.  No  Thyromegaly or nodules. No lymphadenopathy.  RESPIRATORY: Normal respiratory effort.  Lungs clear to auscultation.   CARDIOVASCULAR: RRR, no murmur, gallop, or rub.  ABDOMEN: Soft, nontender, nondistended.  No palpable masses.  No rebound or guarding.  No inguinal lymphadenopathy.  No hepatosplenomegaly.  No hernias.  BACK: No CVA tenderness  EXTREMITIES: No edema  SKIN: No rash    BREAST: No masses or tenderness.  No skin changes.  No nipple inversion or discharge. No axillary lymphadenopathy.      GENITOURINARY:  Normal external genitalia, no lesions.  Normal urethral meatus, no masses or tenderness.  Normal bladder without fullness or masses.  Vagina well estrogenized, no vaginal discharge or lesions.  Cervix without lesions or discharge, nontender.  Uterus 10 weeks size, bulky, irregular, tender with palpation.  Adnexa nontender, no masses.  Normal anus and perineum.    Rectal Exam - not indicated.     Ultrasound pelvis 1/21/2021 -uterus measures 5.4 x 7.0 x 6.6 cm with a exophytic fibroid measuring 3.2 x 2.4 x 3.8 cm.  There is also a myometrial fundal fibroid measuring 4.8 x 3.8 x 4.3 cm.  The endometrial complex is obscured by fibroids.  Ovaries appear normal.       Assessment & Plan        1. Well woman exam with routine gynecological exam  Reviewed monthly self breast exams and need for yearly screening mammograms starting at age 40.  Discussed calcium intake, Vitamin D,  and weight bearing exercise for bone health.     2. Screening for cervical cancer  - THINPREP PAP WITH HPV; Future    3. Screening for human papillomavirus (HPV)  - THINPREP PAP WITH HPV; Future        4.  Fibroid uterus with chronic pain  Discussed management options.  The patient is currently using NuvaRing which is contraindicated due to her history of migraine with aura.  I have recommended she discontinue this immediately for risk of stroke.  We discussed options of cycle control with Depo-Provera, or Nexplanon.  I do not feel a  Mirena IUD would be an acceptable option due to the location of the fibroids.  We also discussed that myomectomy is likely not an option due to the location of the fibroids and size of the fibroids.  We discussed definitive management with hysterectomy, as the patient does not plan to conceive in the future.  We discussed that with the history of the Crohn's disease, hysterectomy may be more complicated and difficult if extensive bowel adhesions are present.  She would like to proceed with the Nexplanon at this point.    Follow-up for Nexplanon insertion.

## 2021-08-16 DIAGNOSIS — Z12.4 SCREENING FOR CERVICAL CANCER: ICD-10-CM

## 2021-08-16 DIAGNOSIS — Z11.51 SCREENING FOR HUMAN PAPILLOMAVIRUS (HPV): ICD-10-CM

## 2021-08-16 LAB — AMBIGUOUS DTTM AMBI4: NORMAL

## 2021-08-17 ENCOUNTER — APPOINTMENT (OUTPATIENT)
Dept: PHYSICAL THERAPY | Facility: MEDICAL CENTER | Age: 36
End: 2021-08-17
Attending: PHYSICAL MEDICINE & REHABILITATION
Payer: COMMERCIAL

## 2021-08-17 LAB
CYTOLOGY REG CYTOL: NORMAL
HPV HR 12 DNA CVX QL NAA+PROBE: NEGATIVE
HPV16 DNA SPEC QL NAA+PROBE: NEGATIVE
HPV18 DNA SPEC QL NAA+PROBE: NEGATIVE
SPECIMEN SOURCE: NORMAL

## 2021-08-17 NOTE — OP THERAPY DAILY TREATMENT
"  Outpatient Physical Therapy  DAILY TREATMENT     [facility] Outpatient Physical Therapy  [address]  [place] [address]  Phone:  [phone]  Fax:  [phone]    Date: 08/17/2021    Patient: Mary Lynch  YOB: 1985  MRN: [id number]     Time Calculation                   Chief Complaint: No chief complaint on file.    Visit #: 6    SUBJECTIVE:  ***    OBJECTIVE:  Current objective measures: ***          Therapeutic Exercises (CPT 03121):     1. Supine obliques with pink tband , 20x     2. Quadriped resisted shoulder flexion, 20x     3. Bridge on swiss ball , 20 sec holds x 10 reps     4. Psoas stretch     5. Walking/ running on treadmill , 17 min on treadmill with 3 sections of 2 minutes of running     6. Tall kneel slide backs with shoulder abduction     7. Bridge plus marching with stick on perlvis for stability       Therapeutic Exercise Summary: Access Code: [email or web address]  URL: [email or web address]  Date: 07/27/2021  Prepared by: Tammie Torres    Exercises  Psoas Mobilization with Small Ball - 1 x daily - 7 x weekly - 3 sets - 10 reps  Modified Sae Stretch - 1 x daily - 7 x weekly - 2 sets - 30 hold      Therapeutic Treatments and Modalities:     1. Mechanical Traction (CPT 55213), 80/60#   60 / 20 sec with heat x 1 5min     Time-based treatments/modalities:           Pain rating (1-10) before treatment:  {PAIN NUMBERS_1-10:02722}  Pain rating (1-10) after treatment:  {PAIN NUMBERS_1-10:47481}    ASSESSMENT:   Response to treatment: ***    PLAN/RECOMMENDATIONS:   Plan for treatment: {AMB OP THERAPY - THERAPY PLAN:845326943::\"therapy treatment to continue next visit\"}.  Planned interventions for next visit: {PT PLANNED THERAPY INTERVENTIONS:811454711::\"continue with current treatment\"}.       "

## 2021-08-19 ENCOUNTER — PHYSICAL THERAPY (OUTPATIENT)
Dept: PHYSICAL THERAPY | Facility: MEDICAL CENTER | Age: 36
End: 2021-08-19
Attending: PHYSICAL MEDICINE & REHABILITATION
Payer: COMMERCIAL

## 2021-08-19 DIAGNOSIS — G89.29 CHRONIC RIGHT-SIDED LOW BACK PAIN WITH RIGHT-SIDED SCIATICA: ICD-10-CM

## 2021-08-19 DIAGNOSIS — M51.26 LUMBAR DISC HERNIATION: ICD-10-CM

## 2021-08-19 DIAGNOSIS — M54.16 LUMBAR RADICULOPATHY: ICD-10-CM

## 2021-08-19 DIAGNOSIS — M54.41 CHRONIC RIGHT-SIDED LOW BACK PAIN WITH RIGHT-SIDED SCIATICA: ICD-10-CM

## 2021-08-19 PROCEDURE — 97110 THERAPEUTIC EXERCISES: CPT

## 2021-08-19 PROCEDURE — 97014 ELECTRIC STIMULATION THERAPY: CPT

## 2021-08-19 NOTE — OP THERAPY DAILY TREATMENT
Outpatient Physical Therapy  DAILY TREATMENT     [facility] Outpatient Physical Therapy  [address]  [place] [address]  Phone:  [phone]  Fax:  [phone]    Date: 08/19/2021    Patient: Mary Lynch  YOB: 1985  MRN: [id number]     Time Calculation    Start time: 1045  Stop time: 1130 Time Calculation (min): 45 minutes         Chief Complaint: No chief complaint on file.    Visit #: 6    SUBJECTIVE:  I am getting my 3rd vaccine tomorrow.   I am doing pretty well. I have been doing all of the new moves. On Tuesday, when the smoke broke, I did my 6 minutes, but I think I need to ramp up with it. I haven't had any worse pain, but I think I need to just ramp up to it.     OBJECTIVE:  Current objective measures:       Discussion regarding ramp up in running time over the next 3 weeks, 2x per week only the first week, then progressing to 3 x per week up to 4 min and then 6 min 3 x per week. Recommend patient not run 5 x per week, walk as discussed every other day.     Therapeutic Exercises (CPT 11932):     1. Supine obliques with pink tband , 20x     2. Quadriped resisted shoulder flexion, 20x     3. Bridge on swiss ball , 20 sec holds x 10 reps     4. Psoas stretch     5. Walking/ running on treadmill , 17 min on treadmill with 3 sections of 2 minutes of running     6. Tall kneel slide backs with shoulder abduction     7. Bridge plus marching with stick on perlvis for stability       Therapeutic Exercise Summary: Access Code: [email or web address]  URL: [email or web address]  Date: 07/27/2021  Prepared by: Tammie Torres    Exercises  Psoas Mobilization with Small Ball - 1 x daily - 7 x weekly - 3 sets - 10 reps  Modified Sae Stretch - 1 x daily - 7 x weekly - 2 sets - 30 hold      Therapeutic Treatments and Modalities:     1. E Stim Unattended (CPT 29763), IFC and heat x 15 min to L/S  in prone , given info on home stim unit    Time-based  treatments/modalities:    Physical Therapy Timed Treatment Charges  Therapeutic exercise minutes (CPT 31953): 30 minutes          ASSESSMENT:   Response to treatment:  Patient is doing well. Encouraged to continue with current HEP and then progress exercise schedule as discussed. Will follow up in 3 weeks to progress as needed.   PLAN/RECOMMENDATIONS:   Plan for treatment: therapy treatment to continue next visit.  Planned interventions for next visit: continue with current treatment.

## 2021-09-14 ENCOUNTER — PHYSICAL THERAPY (OUTPATIENT)
Dept: PHYSICAL THERAPY | Facility: MEDICAL CENTER | Age: 36
End: 2021-09-14
Attending: PHYSICAL MEDICINE & REHABILITATION
Payer: COMMERCIAL

## 2021-09-14 DIAGNOSIS — M54.41 CHRONIC RIGHT-SIDED LOW BACK PAIN WITH RIGHT-SIDED SCIATICA: ICD-10-CM

## 2021-09-14 DIAGNOSIS — M54.16 LUMBAR RADICULOPATHY: ICD-10-CM

## 2021-09-14 DIAGNOSIS — G89.29 CHRONIC RIGHT-SIDED LOW BACK PAIN WITH RIGHT-SIDED SCIATICA: ICD-10-CM

## 2021-09-14 DIAGNOSIS — M51.26 LUMBAR DISC HERNIATION: ICD-10-CM

## 2021-09-14 PROCEDURE — 97110 THERAPEUTIC EXERCISES: CPT

## 2021-09-14 NOTE — OP THERAPY DISCHARGE SUMMARY
Outpatient Physical Therapy  DISCHARGE SUMMARY NOTE      [facility] Outpatient Physical Therapy  [address]  [place] [address]  Phone:  [phone]  Fax:  [phone]    Date of Visit: 09/14/2021    Patient: Mary Lynch  YOB: 1985  MRN: [id number]     Referring Provider: Godfrey Vigil M.D.  [address]  [address]  [place] [address]   Referring Diagnosis Radiculopathy, lumbar region [M54.16];Other intervertebral disc displacement, lumbar region [M51.26];Lumbago with sciatica, right side [M54.41];Other chronic pain [G89.29]         Functional Assessment Used        Your patient is being discharged from Physical Therapy with the following comments:   · Goals met    Comments:  Patient is independent in HEP        Recommendations:  D/c from PT at this time.    Tammie Torres, PT, DPT    Date: 9/14/2021

## 2021-09-14 NOTE — OP THERAPY DAILY TREATMENT
Outpatient Physical Therapy  DAILY TREATMENT     [facility] Outpatient Physical Therapy  [address]  [place] [address]  Phone:  [phone]  Fax:  [phone]    Date: 09/14/2021    Patient: Mary Lynch  YOB: 1985  MRN: [id number]     Time Calculation    Start time: 1540  Stop time: 1615 Time Calculation (min): 35 minutes         Chief Complaint: No chief complaint on file.    Visit #: 7    SUBJECTIVE:  I have been really good.  I have been painfree for 2 weeks. I have been really sick because of all the cedar trees burning.  I have been doing a VR cardio game.  I have just started to get back into walking and running for 30 second increments for 2 minutes total.      OBJECTIVE:  Current objective measures:       1. Patient will score <25 on the RMQ. Goal met   2. Patient will be able to participate in exercise program including moderate exercise without an increase in pain.  Goal met   3. Patient will be independent in upgraded HEP with written instructions. Goal met     Therapeutic Exercises (CPT 80446):     1. Supine obliques with pink tband , 20x     2. Quadriped resisted shoulder flexion, 20x     3. Bridge on swiss ball , 20 sec holds x 10 reps     4. Psoas stretch     5. Walking/ running on treadmill , 17 min on treadmill with 3 sections of 2 minutes of running     6. Tall kneel slide backs with shoulder abduction     7. Bridge plus marching with stick on perlvis for stability       Therapeutic Exercise Summary: Access Code: [email or web address]  URL: [email or web address]  Date: 07/27/2021  Prepared by: Tammie Torres    Exercises  Psoas Mobilization with Small Ball - 1 x daily - 7 x weekly - 3 sets - 10 reps  Modified Sae Stretch - 1 x daily - 7 x weekly - 2 sets - 30 hold      Therapeutic Treatments and Modalities:     1. E Stim Unattended (CPT 99866), IFC and heat x 15 min to L/S  in prone , given info on home stim unit    Time-based  treatments/modalities:    Physical Therapy Timed Treatment Charges  Therapeutic exercise minutes (CPT 73917): 20 minutes        ASSESSMENT:   Response to treatment: Patient reports no pain and has met all LTGs.     PLAN/RECOMMENDATIONS:   Plan for treatment: D/C from PT

## 2021-10-18 ENCOUNTER — OFFICE VISIT (OUTPATIENT)
Dept: PHYSICAL MEDICINE AND REHAB | Facility: MEDICAL CENTER | Age: 36
End: 2021-10-18
Payer: COMMERCIAL

## 2021-10-18 VITALS
HEART RATE: 88 BPM | OXYGEN SATURATION: 93 % | SYSTOLIC BLOOD PRESSURE: 98 MMHG | DIASTOLIC BLOOD PRESSURE: 62 MMHG | WEIGHT: 184.75 LBS | BODY MASS INDEX: 31.54 KG/M2 | TEMPERATURE: 97.4 F | HEIGHT: 64 IN

## 2021-10-18 DIAGNOSIS — M51.26 LUMBAR DISC HERNIATION: ICD-10-CM

## 2021-10-18 DIAGNOSIS — G89.29 CHRONIC RIGHT-SIDED LOW BACK PAIN WITH RIGHT-SIDED SCIATICA: ICD-10-CM

## 2021-10-18 DIAGNOSIS — M54.16 LUMBAR RADICULOPATHY: ICD-10-CM

## 2021-10-18 DIAGNOSIS — M54.41 CHRONIC RIGHT-SIDED LOW BACK PAIN WITH RIGHT-SIDED SCIATICA: ICD-10-CM

## 2021-10-18 PROCEDURE — 99214 OFFICE O/P EST MOD 30 MIN: CPT | Performed by: PHYSICAL MEDICINE & REHABILITATION

## 2021-10-18 ASSESSMENT — PAIN SCALES - GENERAL: PAINLEVEL: 3=SLIGHT PAIN

## 2021-10-18 ASSESSMENT — PATIENT HEALTH QUESTIONNAIRE - PHQ9: CLINICAL INTERPRETATION OF PHQ2 SCORE: 0

## 2021-10-18 ASSESSMENT — FIBROSIS 4 INDEX: FIB4 SCORE: 0.63

## 2021-10-18 NOTE — PROGRESS NOTES
Follow up patient note  Interventional spine and sports physiatry, Physical medicine rehabilitation      Chief complaint:   Chief Complaint   Patient presents with   • Follow-Up     Back pain          HISTORY    Please see new patient note by Dr Vigil,  for more details.     HPI  Patient identification: Magaly Lynch ,  1985,   With Diagnoses of Lumbar radiculopathy, Lumbar disc herniation, and Chronic right-sided low back pain with right-sided sciatica were pertinent to this visit.     Procedures:  2021 right L5-S1 transforaminal epidural steroid injection with 80 percent pain relief for one month.  Greater than 50% pain relief for over 6 months    Patient has had significant improvement in pain.  She notes that her pain is overall 2 out of 10 intensity which can radiate down the right leg intermittently.  She has been able to do her home exercise program stretching routine is returned to yoga and is also doing cardiovascular exercise.  Given the improvement of pain she has been able to use her medications less frequently.        ROS Red Flags :   Fever, Chills, Sweats: Denies  Involuntary Weight Loss: Denies  Bowel/Bladder Incontinence: Denies  Saddle Anesthesia: Denies        PMHx:   Past Medical History:   Diagnosis Date   • Cancer (HCC)    • Crohn's disease (HCC)    • Hip pain, bilateral    • Hypertension    • IBD (inflammatory bowel disease)    • Kidney stone  to 2015    4x (mostly on right side).    • Migraines    • MRSA infection     sinus infection   • Ovarian cyst     age 18, burst       PSHx:   Past Surgical History:   Procedure Laterality Date   • LUMBAR TRANSFORAMINAL EPIDURAL STEROID INJECTION Right 2021    Procedure: RIGHT L4-5 transforaminal epidural steroid injection with sedation;  Surgeon: Godfrey Vigil M.D.;  [facility];  Service: Pain Management   • DENTAL EXTRACTION(S)         Family history   Family History   Problem  Relation Age of Onset   • Breast Cancer Mother 57        no genetic markers   • Other Mother         MS, fibroids/hysterectomy   • Cancer Maternal Grandmother         uterine or fibrioids?, breast, skin, lung   • Lung Disease Maternal Grandmother         emphysema   • Psychiatric Illness Maternal Grandmother         suicide   • GI Disease Maternal Grandfather         Crohn's   • GI Disease Maternal Aunt         Crohn's         Medications:   Outpatient Medications Marked as Taking for the 10/18/21 encounter (Office Visit) with Godfrey Vigil M.D.   Medication Sig Dispense Refill   • ethinyl estradiol-etonogestrel (NUVARING) 0.12-0.015 MG/24HR vaginal ring 1 ring PV x3wk, off x1wk. 3 Each 3   • inFLIXimab (REMICADE) 100 MG Recon Soln Infuse  into a venous catheter.     • Adapalene 0.3 % Gel      • Omega-3 Fatty Acids (FISH OIL) 1000 MG Cap capsule Take 1,000 mg by mouth every day.     • therapeutic multivitamin-minerals (THERAGRAN-M) Tab Take 1 Tab by mouth every day.     • Probiotic Product (PROBIOTIC-10 PO) Take  by mouth.          Current Outpatient Medications on File Prior to Visit   Medication Sig Dispense Refill   • ethinyl estradiol-etonogestrel (NUVARING) 0.12-0.015 MG/24HR vaginal ring 1 ring PV x3wk, off x1wk. 3 Each 3   • inFLIXimab (REMICADE) 100 MG Recon Soln Infuse  into a venous catheter.     • Adapalene 0.3 % Gel      • Omega-3 Fatty Acids (FISH OIL) 1000 MG Cap capsule Take 1,000 mg by mouth every day.     • therapeutic multivitamin-minerals (THERAGRAN-M) Tab Take 1 Tab by mouth every day.     • Probiotic Product (PROBIOTIC-10 PO) Take  by mouth.       No current facility-administered medications on file prior to visit.         Allergies:   Allergies   Allergen Reactions   • Sumatriptan Shortness of Breath   • Infliximab      15min into infusion at 10cc/hr, pt reported chest tightness/ ticklish feeling and cough. Infusion stopped. O2 supplement/Benadryl /albuterol administered. Monitored for about  2hrs and discharged in stable condition. VSS throughout the treatment.    • Cedarwood Oil    • Grass Extracts [Gramineae Pollens]    • Lidocaine      Nasal spray causes fainting (dental shots were okay)   • Poison Orlando Extract [Extract Of Poison Orlando]    • Pollen Extract    • Prochlorperazine      distonic   • Promethazine      distonic   • Zolmitriptan      sleepy   • Azathioprine Itching and Rash       Social Hx:   Social History     Socioeconomic History   • Marital status:      Spouse name: Not on file   • Number of children: Not on file   • Years of education: Not on file   • Highest education level: Not on file   Occupational History   • Occupation: pharmceutical      Comment: Prior job   Tobacco Use   • Smoking status: Never Smoker   • Smokeless tobacco: Never Used   Vaping Use   • Vaping Use: Never used   Substance and Sexual Activity   • Alcohol use: Yes     Comment: once a week   • Drug use: Yes     Frequency: 1.0 times per week     Types: Marijuana     Comment: past hallucinogens, smoking (vape)   • Sexual activity: Yes     Partners: Female   Other Topics Concern   •  Service No   • Blood Transfusions No   • Caffeine Concern No   • Occupational Exposure No   • Hobby Hazards No   • Sleep Concern No   • Stress Concern No   • Weight Concern No   • Special Diet Yes   • Back Care No   • Exercise Yes   • Bike Helmet No   • Seat Belt Yes   • Self-Exams No   Social History Narrative   • Not on file     Social Determinants of Health     Financial Resource Strain:    • Difficulty of Paying Living Expenses:    Food Insecurity:    • Worried About Running Out of Food in the Last Year:    • Ran Out of Food in the Last Year:    Transportation Needs:    • Lack of Transportation (Medical):    • Lack of Transportation (Non-Medical):    Physical Activity:    • Days of Exercise per Week:    • Minutes of Exercise per Session:    Stress:    • Feeling of Stress :    Social Connections:    • Frequency of  "Communication with Friends and Family:    • Frequency of Social Gatherings with Friends and Family:    • Attends Spiritism Services:    • Active Member of Clubs or Organizations:    • Attends Club or Organization Meetings:    • Marital Status:    Intimate Partner Violence:    • Fear of Current or Ex-Partner:    • Emotionally Abused:    • Physically Abused:    • Sexually Abused:          EXAMINATION     Physical Exam:   Vitals: BP (!) 98/62 (BP Location: Left arm, Patient Position: Sitting, BP Cuff Size: Adult)   Pulse 88   Temp 36.3 °C (97.4 °F) (Temporal)   Ht 1.626 m (5' 4\")   Wt 83.8 kg (184 lb 11.9 oz)   SpO2 93%     Constitutional:   Body Habitus: Body mass index is 31.71 kg/m².  Cooperation: Fully cooperates with exam  Appearance: Well-groomed no disheveled    Respiratory-  breathing comfortable on room air, no audible wheezing  Cardiovascular- capillary refills less than 2 seconds. No lower extremity edema is noted.   Psychiatric- alert and oriented ×3. Normal affect.    MSK and Neuro: -        Thoracic/Lumbar Spine/Sacral Spine/Hips   There are no signs of infection around the injection sites.   full  active range of motion with flexion, lateral flexion, and rotation bilaterally.   There is full  active range of motion with lumbar extension.      Palpation:   No tenderness to palpation in midline at T1-T12 levels. No tenderness to palpation in the left and right of the midline T1-L5, NEGATIVE for tenderness to palpation to the para-midline region in the lower lumbar levels.  palpation over SI joint: negative bilaterally    palpation in hip or over the gluteus medius tendon insertion: negative bilaterally      Lumbar spine Special tests  Neuro tension  Straight leg test negative bilaterally    Slump test negative bilaterally      Key points for the international standards for neurological classification of spinal cord injury (ISNCSCI) to light touch.     Dermatome R L                                    "   L2 2 2   L3 2 2   L4 2 2   L5 2 2   S1 2 2   S2 2 2         Motor Exam Lower Extremities    ? Myotome R L   Hip flexion L2 5 5   Knee extension L3 5 5   Ankle dorsiflexion L4 5 5   Toe extension L5 5 5   Ankle plantarflexion S1 5 5                 MEDICAL DECISION MAKING    DATA    Labs:   Lab Results   Component Value Date/Time    SODIUM 135 05/06/2021 02:39 PM    POTASSIUM 3.9 05/06/2021 02:39 PM    CHLORIDE 102 05/06/2021 02:39 PM    CO2 23 05/06/2021 02:39 PM    GLUCOSE 76 05/06/2021 02:39 PM    BUN 11 05/06/2021 02:39 PM    CREATININE 0.69 05/06/2021 02:39 PM        No results found for: PROTHROMBTM, INR     Lab Results   Component Value Date/Time    WBC 8.3 05/06/2021 02:39 PM    RBC 4.81 05/06/2021 02:39 PM    HEMOGLOBIN 15.1 05/06/2021 02:39 PM    HEMATOCRIT 44.2 05/06/2021 02:39 PM    MCV 91.9 05/06/2021 02:39 PM    MCH 31.4 05/06/2021 02:39 PM    MCHC 34.2 05/06/2021 02:39 PM    MPV 10.6 05/06/2021 02:39 PM    NEUTSPOLYS 60.40 05/06/2021 02:39 PM    LYMPHOCYTES 27.10 05/06/2021 02:39 PM    MONOCYTES 5.20 05/06/2021 02:39 PM    EOSINOPHILS 5.80 05/06/2021 02:39 PM    BASOPHILS 1.10 05/06/2021 02:39 PM        No results found for: HBA1C       Imaging:   I personally reviewed following images  MRI Lumbar spine 4/14/2021  There was a small high intensity zone in the right paracentric region consistent with an annular tear and a small disc herniation.  No significant neuroforaminal or central canal stenosis.  However annular tears can cause a chemical neuritis.    I reviewed the following radiology reports                     Results for orders placed in visit on 04/14/21    MR-LUMBAR SPINE-W/O    Impression  1.  L4-5 disc bulge with small right paracentral disc protrusion and tiny tear of the annulus fibrosis. No significant central canal or neural foraminal narrowing.    2.  Pelvic mass likely representing a retroverted uterus with multiple uterine fibroids.        Results for orders placed in visit on  21    MR-LUMBAR SPINE-W/O    Impression  1.  L4-5 disc bulge with small right paracentral disc protrusion and tiny tear of the annulus fibrosis. No significant central canal or neural foraminal narrowing.    2.  Pelvic mass likely representing a retroverted uterus with multiple uterine fibroids.                                                                                                                Results for orders placed during the hospital encounter of 21    DX-LUMBAR SPINE-2 OR 3 VIEWS    Impression  1.  Normal lumbar spine series.                         DIAGNOSIS   Visit Diagnoses     ICD-10-CM   1. Lumbar radiculopathy  M54.16   2. Lumbar disc herniation  M51.26   3. Chronic right-sided low back pain with right-sided sciatica  M54.41    G89.29         ASSESSMENT and PLAN:     Magaly Lynch  1985 female      Magaly was seen today for follow-up.    Diagnoses and all orders for this visit:    Lumbar radiculopathy    Lumbar disc herniation    Chronic right-sided low back pain with right-sided sciatica          The above issues are stable.  Given the improvement of pain we have decreased the patient's medications.  Discontinue Zanaflex and meloxicam as she is no longer needing these.  If she does have a flare of pain will be reasonable to restart meloxicam 15 mg daily and Zanaflex 4 mg nightly as needed.  We discussed additions to the patient's home exercise program.      Follow up: As needed with me    Thank you for allowing me to participate in the care of this patient. If you have any questions please not hesitate to contact me.             Please note that this dictation was created using voice recognition software. I have made every reasonable attempt to correct obvious errors but there may be errors of grammar and content that I may have overlooked prior to finalization of this note.      Godfrey Vigil MD  Interventional Spine and Sports Physiatry  Physical Medicine and  [facility] Jasmin Palencia M.D.

## 2021-10-28 ENCOUNTER — NON-PROVIDER VISIT (OUTPATIENT)
Dept: MEDICAL GROUP | Facility: IMAGING CENTER | Age: 36
End: 2021-10-28

## 2021-10-28 ENCOUNTER — TELEMEDICINE (OUTPATIENT)
Dept: MEDICAL GROUP | Facility: IMAGING CENTER | Age: 36
End: 2021-10-28
Payer: COMMERCIAL

## 2021-10-28 VITALS
WEIGHT: 175 LBS | HEIGHT: 64 IN | BODY MASS INDEX: 29.88 KG/M2 | OXYGEN SATURATION: 96 % | TEMPERATURE: 99.4 F | HEART RATE: 70 BPM

## 2021-10-28 DIAGNOSIS — J02.9 PHARYNGITIS, UNSPECIFIED ETIOLOGY: ICD-10-CM

## 2021-10-28 DIAGNOSIS — J02.0 ACUTE STREPTOCOCCAL PHARYNGITIS: ICD-10-CM

## 2021-10-28 LAB
HETEROPH AB SER QL LA: NEGATIVE
INT CON NEG: NORMAL
INT CON NEG: NORMAL
INT CON POS: NORMAL
INT CON POS: NORMAL
S PYO AG THROAT QL: POSITIVE

## 2021-10-28 PROCEDURE — 99999 PR NO CHARGE: CPT | Performed by: CLINICAL NURSE SPECIALIST

## 2021-10-28 PROCEDURE — 87880 STREP A ASSAY W/OPTIC: CPT | Performed by: CLINICAL NURSE SPECIALIST

## 2021-10-28 PROCEDURE — 99213 OFFICE O/P EST LOW 20 MIN: CPT | Mod: 95 | Performed by: CLINICAL NURSE SPECIALIST

## 2021-10-28 PROCEDURE — 86308 HETEROPHILE ANTIBODY SCREEN: CPT | Performed by: CLINICAL NURSE SPECIALIST

## 2021-10-28 RX ORDER — PENICILLIN V POTASSIUM 500 MG/1
500 TABLET ORAL 2 TIMES DAILY
Qty: 20 TABLET | Refills: 0 | Status: SHIPPED | OUTPATIENT
Start: 2021-10-28 | End: 2022-10-03

## 2021-10-28 ASSESSMENT — PAIN SCALES - GENERAL: PAINLEVEL: 6=MODERATE PAIN

## 2021-10-28 ASSESSMENT — FIBROSIS 4 INDEX: FIB4 SCORE: 0.63

## 2021-10-28 NOTE — PROGRESS NOTES
1. Acute streptococcal pharyngitis  Mary returned for strep and mono testing. POCT strep test positive. Penicillin V 500mg BID for 10 days prescribed.  Confirmed she is not allergic to any antibiotics.  She is to notify me if this does not resolve the problem.    - penicillin v potassium (VEETID) 500 MG Tab; Take 1 Tablet by mouth 2 times a day.  Dispense: 20 Tablet; Refill: 0

## 2021-10-28 NOTE — PROGRESS NOTES
Virtual Visit: Established Patient   This visit was conducted via Zoom using secure and encrypted videoconferencing technology.   The patient was in a private location in the state of [place].    The patient's identity was confirmed and verbal consent was obtained for this virtual visit.    Subjective:   CC:   Chief Complaint   Patient presents with   • Establish Care     sick since sunday   • Cough   • Pharyngitis     hurts to swallow, neck pain   • Runny Nose   • Fever     had fever yesterday     Magaly Lynch is a 36 y.o. female presenting for evaluation and management of:    Sore throat    HPI  Sore throat with lateral neck pain x4 days.  Stuffy nose with cough productive, green/gray, fatigue and fever, high 101F, now 99.6F. Home COVID-19 test completed yesterday and negative.  She has another PCR test scheduled for Saturday. She has had all vaccinations including her booster.  She has a son in school and is sick today with fever. + neck stiffness with lateral rotation and extension, achey, no radiation down spine.  Arthralgias generalized, myalgias.  Takes Remicade.  She feels somewhat better today.  Sore throat slightly better today. She is able to eat, no appetite but that is chronic.  No n/v, diarrhea.    ROS   See HPI    Current medicines (including changes today)  Current Outpatient Medications   Medication Sig Dispense Refill   • Acetaminophen (TYLENOL PO) Take  by mouth.     • IBUPROFEN PO Take  by mouth.     • inFLIXimab (REMICADE) 100 MG Recon Soln Infuse  into a venous catheter.     • Adapalene 0.3 % Gel      • Omega-3 Fatty Acids (FISH OIL) 1000 MG Cap capsule Take 1,000 mg by mouth every day.     • therapeutic multivitamin-minerals (THERAGRAN-M) Tab Take 1 Tab by mouth every day.     • Probiotic Product (PROBIOTIC-10 PO) Take  by mouth.     • ethinyl estradiol-etonogestrel (NUVARING) 0.12-0.015 MG/24HR vaginal ring 1 ring PV x3wk, off x1wk. 3 Each 3     No current facility-administered  "medications for this visit.       Patient Active Problem List    Diagnosis Date Noted   • Crohn's disease of small intestine without complication (HCC) 10/06/2020   • Acute pain of right knee 03/06/2020   • Fibroid 01/28/2020   • Nephrolithiasis 01/14/2020   • Recurrent sinusitis 01/14/2020   • Crohn's disease (HCC) 06/17/2019   • Intractable migraine with aura without status migrainosus 06/17/2019   • Healthcare maintenance 04/26/2012        Objective:   Pulse 70   Temp 37.4 °C (99.4 °F) (Temporal)   Ht 1.626 m (5' 4\")   Wt 79.4 kg (175 lb)   LMP 10/11/2021 (Exact Date)   SpO2 96%   BMI 30.04 kg/m²     Physical Exam:    Constitutional: Alert, hoarse, well-groomed.  Skin: No rashes in visible areas.  Eye: Round. Conjunctiva clear, lids normal. No icterus.   ENMT: Lips pink without lesions, good dentition, moist mucous membranes. Tonsillar erythema without exudate or edema   Neck: No masses, no thyromegaly. Pain with lateral movement and neck extension. No radiculopathy or spine pain.  Per pt self palpation, left submandibular lymph node tender. No cervical lymphadenopathy.  Respiratory: + cough. Unlabored respiratory effort without audible wheeze  Psych: Alert and oriented x3, normal affect and mood.       Assessment and Plan:   The following treatment plan was discussed:     1. Pharyngitis, unspecified etiology  Mary has been experiencing sore throat x4 days with productive cough, fatigue, fever, congestion and neck soreness.  T Max 101F. She denies n/v/d.  Her son has a fever starting today. She does not have any tonsillar exudate or edema. She also does not have cervical lymphadenopathy.  However, as she is on infliximab, this may be a confounded presentation.  COVID-19 test at home yesterday negative. She has had a flu shot and 3 COVID-19 vaccines. She is to come in to clinic to be tested for strep and mono.  Pending these results, will treat accordingly.    Follow-up: Return for today for POCT " testing.

## 2021-10-28 NOTE — PROGRESS NOTES
Mary Lynch is a 36 y.o. female here for a non-provider visit for Rapid Strep and Mono POCT testing per provider.     If abnormal was an in office provider notified today (if so, indicate provider)? Yes, notified Tj KEY - Tj spoke to patient in office     Routed to PCP? Routed to Tj KEY, patient's PCP is not in office/on maternity leave.

## 2021-12-10 NOTE — OP THERAPY DAILY TREATMENT
Outpatient Physical Therapy  DAILY TREATMENT     [facility] Outpatient Physical Therapy  [address]  [place] [address]  Phone:  [phone]  Fax:  [phone]    Date: 03/01/2021    Patient: Magaly Lynch  YOB: 1985  MRN: [id number]     Time Calculation    Start time: 0830  Stop time: 0920 Time Calculation (min): 50 minutes         Chief Complaint: No chief complaint on file.    Visit #: 7    SUBJECTIVE:  I am doing better. I did some physical activity that seemed to be okay. I was sore the day of, but the next day I was fine.      OBJECTIVE:  Current objective measures:         Therapeutic Exercises (CPT 94618):     1. Added 90/90 core work     2. Quadriped leg extension     3. Fire hydrant     11. UPOC due 3/29/21      Therapeutic Exercise Summary: Access Code: [email or web address]  URL: [email or web address]  Date: 03/01/2021  Prepared by: Tammie Torres    Exercises  Supine 90/90 Alternating Toe Touch - 10 reps - 3 sets - 1x daily - 7x weekly  Quadruped Alternating Leg Extensions - 10 reps - 3 sets - 1x daily - 7x weekly  Quadruped Fire Hydrant - 10 reps - 3 sets - 1x daily - 7x weekly    Therapeutic Treatments and Modalities:     1. E Stim Unattended (CPT 83743), IFC and heat x 15 min     2. Manual Therapy (CPT 84500), PA unilateral transverse PA Gr 2-3 , cupping to right lateral thigh with distraction , IASTM to l/s     Time-based treatments/modalities:    Physical Therapy Timed Treatment Charges  Manual therapy minutes (CPT 75320): 18 minutes  Therapeutic exercise minutes (CPT 78596): 10 minutes    ASSESSMENT:   Response to treatment:  Decreased overall pain. Patient was shoveling dirt all weekend and seemed to do okay with that without being limited by pain.       PLAN/RECOMMENDATIONS:   Plan for treatment: therapy treatment to continue next visit.  Planned interventions for next visit: continue with current treatment.        ----- Message from Kishan Mejia MD sent at 12/10/2021  5:14 AM EST -----  LDL cholesterol elevated at 142  Recommend start of low dose statin (crestor 5 mg MWF) to lower cardiac risk  Would need LFT's, CPK and FLP 1 month if agreeable

## 2022-02-18 ENCOUNTER — HOSPITAL ENCOUNTER (OUTPATIENT)
Facility: MEDICAL CENTER | Age: 37
End: 2022-02-18
Attending: INTERNAL MEDICINE
Payer: COMMERCIAL

## 2022-02-18 PROCEDURE — 83993 ASSAY FOR CALPROTECTIN FECAL: CPT

## 2022-02-23 LAB — CALPROTECTIN STL-MCNT: 10 UG/G

## 2022-03-10 ENCOUNTER — HOSPITAL ENCOUNTER (OUTPATIENT)
Dept: LAB | Facility: MEDICAL CENTER | Age: 37
End: 2022-03-10
Attending: OBSTETRICS & GYNECOLOGY
Payer: COMMERCIAL

## 2022-03-10 PROCEDURE — 83520 IMMUNOASSAY QUANT NOS NONAB: CPT

## 2022-03-10 PROCEDURE — 36415 COLL VENOUS BLD VENIPUNCTURE: CPT

## 2022-03-16 LAB — MIS SERPL-MCNC: 6.93 NG/ML (ref 0.18–11.71)

## 2022-08-03 ENCOUNTER — OFFICE VISIT (OUTPATIENT)
Dept: MEDICAL GROUP | Facility: IMAGING CENTER | Age: 37
End: 2022-08-03
Payer: COMMERCIAL

## 2022-08-03 ENCOUNTER — HOSPITAL ENCOUNTER (OUTPATIENT)
Facility: MEDICAL CENTER | Age: 37
End: 2022-08-03
Payer: COMMERCIAL

## 2022-08-03 VITALS
HEART RATE: 72 BPM | BODY MASS INDEX: 32.61 KG/M2 | DIASTOLIC BLOOD PRESSURE: 72 MMHG | TEMPERATURE: 98.8 F | OXYGEN SATURATION: 97 % | WEIGHT: 191 LBS | SYSTOLIC BLOOD PRESSURE: 118 MMHG | HEIGHT: 64 IN

## 2022-08-03 DIAGNOSIS — J02.9 SORE THROAT: ICD-10-CM

## 2022-08-03 DIAGNOSIS — R68.89 FLU-LIKE SYMPTOMS: ICD-10-CM

## 2022-08-03 LAB
EXTERNAL QUALITY CONTROL: NORMAL
FLUAV+FLUBV AG SPEC QL IA: NEGATIVE
INT CON NEG: NORMAL
INT CON NEG: NORMAL
INT CON POS: NORMAL
INT CON POS: NORMAL
S PYO AG THROAT QL: NEGATIVE
SARS-COV+SARS-COV-2 AG RESP QL IA.RAPID: NEGATIVE

## 2022-08-03 PROCEDURE — 87426 SARSCOV CORONAVIRUS AG IA: CPT

## 2022-08-03 PROCEDURE — 87804 INFLUENZA ASSAY W/OPTIC: CPT

## 2022-08-03 PROCEDURE — 87880 STREP A ASSAY W/OPTIC: CPT

## 2022-08-03 PROCEDURE — U0003 INFECTIOUS AGENT DETECTION BY NUCLEIC ACID (DNA OR RNA); SEVERE ACUTE RESPIRATORY SYNDROME CORONAVIRUS 2 (SARS-COV-2) (CORONAVIRUS DISEASE [COVID-19]), AMPLIFIED PROBE TECHNIQUE, MAKING USE OF HIGH THROUGHPUT TECHNOLOGIES AS DESCRIBED BY CMS-2020-01-R: HCPCS

## 2022-08-03 PROCEDURE — U0005 INFEC AGEN DETEC AMPLI PROBE: HCPCS

## 2022-08-03 PROCEDURE — 87070 CULTURE OTHR SPECIMN AEROBIC: CPT

## 2022-08-03 PROCEDURE — 99214 OFFICE O/P EST MOD 30 MIN: CPT

## 2022-08-03 RX ORDER — AMOXICILLIN 500 MG/1
1000 CAPSULE ORAL DAILY
Qty: 20 CAPSULE | Refills: 0 | Status: SHIPPED | OUTPATIENT
Start: 2022-08-03 | End: 2022-08-13

## 2022-08-03 ASSESSMENT — FIBROSIS 4 INDEX: FIB4 SCORE: 0.63

## 2022-08-03 ASSESSMENT — PAIN SCALES - GENERAL: PAINLEVEL: 2=MINIMAL-SLIGHT

## 2022-08-03 ASSESSMENT — PATIENT HEALTH QUESTIONNAIRE - PHQ9: CLINICAL INTERPRETATION OF PHQ2 SCORE: 0

## 2022-08-03 NOTE — PROGRESS NOTES
Subjective:     CC:   Chief Complaint   Patient presents with   • Pharyngitis   • Body Aches   • Nausea   • Cough   • Nasal Congestion     All started on Sunday        HPI:   Magaly presents today to discuss:    Patient reports of developing symptoms of nasal congestion, pharyngitis, body aches, nausea, lethargy, and cough starting Sunday. Patient states of her symptoms progressively worsening especially congestion, cough and sore throat. Patient states wife and son tested positive for strep. She has been sharing drinks and food with them.   Patient denies fevers but states she is on Remicaide. However, she has been having chills. She has taken ibuprofen, Tylenol, DayQuil without relief.  No difficulty swallowing, drooling, voice changes.  No shortness of breath, difficulty breathing, orthopnea, palpitations, or chest pain.          Past Medical History:   Diagnosis Date   • Cancer (HCC)    • Crohn's disease (HCC) 20111   • Hip pain, bilateral    • Hypertension    • IBD (inflammatory bowel disease)    • Kidney stone 2008 to 2015    4x (mostly on right side).    • Migraines 1998   • MRSA infection     sinus infection   • Ovarian cyst     age 18, burst     Family History   Problem Relation Age of Onset   • Breast Cancer Mother 57        no genetic markers   • Other Mother         MS, fibroids/hysterectomy   • Cancer Maternal Grandmother         uterine or fibrioids?, breast, skin, lung   • Lung Disease Maternal Grandmother         emphysema   • Psychiatric Illness Maternal Grandmother         suicide   • GI Disease Maternal Grandfather         Crohn's   • GI Disease Maternal Aunt         Crohn's     Past Surgical History:   Procedure Laterality Date   • LUMBAR TRANSFORAMINAL EPIDURAL STEROID INJECTION Right 5/17/2021    Procedure: RIGHT L4-5 transforaminal epidural steroid injection with sedation;  Surgeon: Godfrey Vigil M.D.;  [facility];  Service: Pain Management   • DENTAL EXTRACTION(S)  "      Social History     Tobacco Use   • Smoking status: Never Smoker   • Smokeless tobacco: Never Used   Vaping Use   • Vaping Use: Never used   Substance Use Topics   • Alcohol use: Yes     Comment: once a week   • Drug use: Yes     Frequency: 1.0 times per week     Types: Marijuana     Comment: past hallucinogens, smoking (vape)     Social History     Social History Narrative   • Not on file     Current Outpatient Medications Ordered in Epic   Medication Sig Dispense Refill   • amoxicillin (AMOXIL) 500 MG Cap Take 2 Capsules by mouth every day for 10 days. 20 Capsule 0   • Acetaminophen (TYLENOL PO) Take  by mouth.     • IBUPROFEN PO Take  by mouth.     • inFLIXimab (REMICADE) 100 MG Recon Soln Infuse  into a venous catheter.     • Adapalene 0.3 % Gel      • Omega-3 Fatty Acids (FISH OIL) 1000 MG Cap capsule Take 1,000 mg by mouth every day.     • therapeutic multivitamin-minerals (THERAGRAN-M) Tab Take 1 Tab by mouth every day.     • Probiotic Product (PROBIOTIC-10 PO) Take  by mouth.     • penicillin v potassium (VEETID) 500 MG Tab Take 1 Tablet by mouth 2 times a day. (Patient not taking: Reported on 8/3/2022) 20 Tablet 0     No current Epic-ordered facility-administered medications on file.     Hydrocortisone sod succinate, Sumatriptan, Infliximab, Cedarwood oil, Grass extracts [gramineae pollens], Lidocaine, Poison oak extract [extract of poison oak], Pollen extract, Prochlorperazine, Promethazine, Zolmitriptan, and Azathioprine    ROS: see hpi  Gen: no fevers, +chills  Pulm: no sob, +cough  CV: no chest pain, no palpitations, no edema  GI: no nausea/vomiting, no diarrhea  Skin: no rash    Objective:   Exam:  /72 (BP Location: Left arm, Patient Position: Sitting, BP Cuff Size: Adult)   Pulse 72   Temp 37.1 °C (98.8 °F) (Temporal)   Ht 1.626 m (5' 4\")   Wt 86.6 kg (191 lb)   LMP 07/27/2022 (Within Days)   SpO2 97%   BMI 32.79 kg/m²    Body mass index is 32.79 kg/m².    Gen: Alert and oriented, No " apparent distress.  HEENT: Head atraumatic, normocephalic. Pupils equal and round. Erythematous oropharynx, no exudate, +palpable and tender posterior cervical lymph nodes.  Neck: Neck is supple without lymphadenopathy.   Lungs: Normal effort, CTA bilaterally, no wheezes, rhonchi, or rales  CV: Regular rate and rhythm. No murmurs, rubs, or gallops.  ABD: +BS. Non-tender, non-distended. No rebound, rigidity, or guarding.  Ext: No clubbing, cyanosis, edema.    Assessment & Plan:     36 y.o. female with the following -     1. Sore throat  Patient presentation consistent with pharyngitis. Rapid strep negative in office. Will send for culture. Patient with known exposure to strep pharyngitis. Will treat empirically with Amoxicillin, patient agreeable to this. Side effects discussed.   Recommend supportive measures including humidifier, warm salt water gargles, over-the-counter Cepacol throat lozenges, rest  and increased fluids. May take OTC immune booster supplementation that contains Vitamin C, D, E, zinc, B vitamins.  Recommend to take Mucinex as needed during the day and use throat lozenges such as Ricola. Use dextromethorphan for cough only at night to allow the body to expel the pathogen during the day.  Take 5-10 deep breaths intermittently throughout the day and move the body as tolerated to aid in respiration.  Patient was encouraged to seek treatment in the ER or urgent care for worsening symptoms, fever greater than 100.5, wheezes, shortness of breath, dyspnea, or syncope.    - POCT Rapid Strep A  - CULTURE THROAT; Future  - amoxicillin (AMOXIL) 500 MG Cap; Take 2 Capsules by mouth every day for 10 days.  Dispense: 20 Capsule; Refill: 0    2. Flu-like symptoms  Patient presentation suspicious for Covid and Influenza. Both test negative in office. Will send for Covid-PCR. Recommend supportive measures mentioned above.   - POCT Influenza A/B  - POCT SARS-COV Antigen PHANI Manual Result  - SARS-CoV-2, PCR  (In-House); Future    Medical Decision Making/Course:  In the course of preparing for this visit with review of the pertinent past medical history, recent and past clinic visits, current medications, and performing chart, immunization, medical history and medication reconciliation, and in the further course of obtaining the current history pertinent to the clinic visit today, performing an exam and evaluation, ordering and independently evaluating labs, tests, and/or procedures, prescribing any recommended new medications as noted above, providing any pertinent counseling and education and recommending further coordination of care. This was discussed with patient in a shared-decision making conversation, and they understand and agreed with plan of care.    Return if symptoms worsen or fail to improve.    ANA Piña.   [facility]    Please note that this dictation was created using voice recognition software. I have made every reasonable attempt to correct obvious errors, but I expect that there are errors of grammar and possibly content that I did not discover before finalizing the note.

## 2022-08-03 NOTE — PATIENT INSTRUCTIONS
My recommendations for an upper respiratory infection:  - Use a humidifier, especially at night. Cold or warm water humidifiers have the same effect.  - Nicola Med squeeze bottle sinus rinses or plain nasal saline twice a day.  - Hot tea + honey + fresh lemon juice  - Honey by itself has been shown to help provide cough relief  - Frequent hand washing, rest, hydration  - OTC meds as recommended today during your visit    Follow up with primary care provider. Urgently for worsening symptoms, persistent fevers, facial swelling, visual changes, weakness, elevated heart rate, stiff neck, prolonged cough, persistent wheezing, leg swelling, or any other concerns. Seek emergency medical care immediately for: Trouble breathing, persistent pain or pressure in the chest, confusion, inability to wake or stay awake, bluish lips or face, persistent tachycardia (fast heart rate), prolonged dizziness, persistent high grade fevers.

## 2022-08-04 DIAGNOSIS — R68.89 FLU-LIKE SYMPTOMS: ICD-10-CM

## 2022-08-04 LAB
COVID ORDER STATUS COVID19: NORMAL
SARS-COV-2 RNA RESP QL NAA+PROBE: NOTDETECTED
SPECIMEN SOURCE: NORMAL

## 2022-08-06 LAB
BACTERIA SPEC RESP CULT: NORMAL
SIGNIFICANT IND 70042: NORMAL
SITE SITE: NORMAL
SOURCE SOURCE: NORMAL

## 2022-09-30 ENCOUNTER — TELEPHONE (OUTPATIENT)
Dept: SCHEDULING | Facility: IMAGING CENTER | Age: 37
End: 2022-09-30
Payer: COMMERCIAL

## 2022-10-03 ENCOUNTER — OFFICE VISIT (OUTPATIENT)
Dept: MEDICAL GROUP | Facility: IMAGING CENTER | Age: 37
End: 2022-10-03
Payer: COMMERCIAL

## 2022-10-03 VITALS
HEIGHT: 64 IN | OXYGEN SATURATION: 98 % | HEART RATE: 73 BPM | SYSTOLIC BLOOD PRESSURE: 104 MMHG | DIASTOLIC BLOOD PRESSURE: 78 MMHG | BODY MASS INDEX: 33.29 KG/M2 | TEMPERATURE: 97.9 F | WEIGHT: 195 LBS

## 2022-10-03 DIAGNOSIS — Z13.21 ENCOUNTER FOR VITAMIN DEFICIENCY SCREENING: ICD-10-CM

## 2022-10-03 DIAGNOSIS — Z87.42 HISTORY OF OVARIAN CYST: ICD-10-CM

## 2022-10-03 DIAGNOSIS — G43.119 INTRACTABLE MIGRAINE WITH AURA WITHOUT STATUS MIGRAINOSUS: ICD-10-CM

## 2022-10-03 DIAGNOSIS — Z13.79 GENETIC TESTING: ICD-10-CM

## 2022-10-03 DIAGNOSIS — Z13.1 DIABETES MELLITUS SCREENING: ICD-10-CM

## 2022-10-03 DIAGNOSIS — Z76.89 ENCOUNTER TO ESTABLISH CARE: ICD-10-CM

## 2022-10-03 DIAGNOSIS — Z13.0 SCREENING FOR DEFICIENCY ANEMIA: ICD-10-CM

## 2022-10-03 DIAGNOSIS — K50.00 CROHN'S DISEASE OF SMALL INTESTINE WITHOUT COMPLICATION (HCC): ICD-10-CM

## 2022-10-03 DIAGNOSIS — N92.6 IRREGULAR MENSES: ICD-10-CM

## 2022-10-03 DIAGNOSIS — E66.9 OBESITY (BMI 30-39.9): ICD-10-CM

## 2022-10-03 DIAGNOSIS — Z23 NEED FOR VACCINATION: ICD-10-CM

## 2022-10-03 DIAGNOSIS — Z13.220 SCREENING CHOLESTEROL LEVEL: ICD-10-CM

## 2022-10-03 DIAGNOSIS — Z13.29 SCREENING FOR THYROID DISORDER: ICD-10-CM

## 2022-10-03 PROBLEM — M25.561 ACUTE PAIN OF RIGHT KNEE: Status: RESOLVED | Noted: 2020-03-06 | Resolved: 2022-10-03

## 2022-10-03 PROBLEM — J32.9 RECURRENT SINUSITIS: Status: RESOLVED | Noted: 2020-01-14 | Resolved: 2022-10-03

## 2022-10-03 PROCEDURE — 90471 IMMUNIZATION ADMIN: CPT | Performed by: PHYSICIAN ASSISTANT

## 2022-10-03 PROCEDURE — 99214 OFFICE O/P EST MOD 30 MIN: CPT | Mod: 25 | Performed by: PHYSICIAN ASSISTANT

## 2022-10-03 PROCEDURE — 90686 IIV4 VACC NO PRSV 0.5 ML IM: CPT | Performed by: PHYSICIAN ASSISTANT

## 2022-10-03 RX ORDER — TOPIRAMATE SPINKLE 25 MG/1
25 CAPSULE ORAL 2 TIMES DAILY
Qty: 60 CAPSULE | Refills: 0 | Status: SHIPPED | OUTPATIENT
Start: 2022-10-03 | End: 2022-10-13 | Stop reason: SDUPTHER

## 2022-10-03 ASSESSMENT — FIBROSIS 4 INDEX: FIB4 SCORE: 0.63

## 2022-10-03 NOTE — ASSESSMENT & PLAN NOTE
Chronic, uncontrolled. 3 migraines within the past 2 weeks. Previously one every 2 months. Typically last 6-48 hours. Takes ibuprofen. No current migraine, but when she does she has photophobia and aura. +nausea. Mild phonophobia.   Cannot tolerate Zomig and Imitrex  Birth control for ovarian cysts until 8/2021

## 2022-10-03 NOTE — PROGRESS NOTES
Subjective:     CC:   Chief Complaint   Patient presents with    [facility]    Migraine       HPI:   Magaly presents today to discuss:    Intractable migraine with aura without status migrainosus  Chronic, uncontrolled. 3 migraines within the past 2 weeks. Previously one every 2 months. Typically last 6-48 hours. Takes ibuprofen. No current migraine, but when she does she has photophobia and aura. +nausea. Mild phonophobia.   Cannot tolerate Zomig and Imitrex  Birth control for ovarian cysts until 2021    Crohn's disease of small intestine without complication (HCC)  Chronic, controlled and stable. Managed by GI consultants - Dr. Pompa  On Remicade since , last c-scope .    History of ovarian cyst  History of ovarian cysts and irregular menses.  Currently off birth control.  No previous work-up for PCOS.    Ob-Gyn/ History:    Patient has GYN provider: Nathalie Mora  /Para:  , 1 son - wife gave birth  No LMP recorded.  Last pap smear: 2021  History of abnormal pap smears: yes, negative colposcopy  Currently sexually active: yes, with wife  H/O STD: no  Periods regular  Cramping is moderate-severe    Past Medical History:   Diagnosis Date    Cancer (HCC)     Crohn's disease (HCC)     Hip pain, bilateral     Hypertension     IBD (inflammatory bowel disease)     Kidney stone  to 2015    4x (mostly on right side).     Migraines     MRSA infection     sinus infection    Ovarian cyst     age 18, burst    Recurrent sinusitis 2020    -Patient prefers antibiotic treatment at this time.  We have had an antibiotic stewardship conversation.  Clindamycin is the next step given that she failed Augmentin and has confirmed MRSA on nasal swab.  However she has Crohn's disease and not comfortable prescribing this due to increased risk of C. difficile infection.  We will now try Bactrim and doxycycline for 10 days. -Patient aware of most     Family History   Problem Relation Age of Onset     Breast Cancer Mother 57        BRCA negative    Other Mother         MS, fibroids/hysterectomy    GI Disease Maternal Aunt         Crohn's    Cancer Maternal Grandmother         uterine or fibrioids?, breast, skin, lung    Lung Disease Maternal Grandmother         emphysema    Psychiatric Illness Maternal Grandmother         suicide    GI Disease Maternal Grandfather         Crohn's     Past Surgical History:   Procedure Laterality Date    LUMBAR TRANSFORAMINAL EPIDURAL STEROID INJECTION Right 5/17/2021    Procedure: RIGHT L4-5 transforaminal epidural steroid injection with sedation;  Surgeon: Godfrey Vigil M.D.;  [facility];  Service: Pain Management    DENTAL EXTRACTION(S)       Social History     Tobacco Use    Smoking status: Never    Smokeless tobacco: Never   Vaping Use    Vaping Use: Never used   Substance Use Topics    Alcohol use: Yes     Comment: once a week    Drug use: Yes     Frequency: 1.0 times per week     Types: Marijuana     Comment: past hallucinogens, smoking (vape)     Social History     Social History Narrative    Not on file     Current Outpatient Medications Ordered in Epic   Medication Sig Dispense Refill    topiramate (TOPAMAX) 25 MG capsule Take 1 Capsule by mouth 2 times a day. 60 Capsule 0    Acetaminophen (TYLENOL PO) Take  by mouth.      IBUPROFEN PO Take  by mouth.      inFLIXimab (REMICADE) 100 MG Recon Soln Infuse  into a venous catheter.      Adapalene 0.3 % Gel       Omega-3 Fatty Acids (FISH OIL) 1000 MG Cap capsule Take 1,000 mg by mouth every day.      therapeutic multivitamin-minerals (THERAGRAN-M) Tab Take 1 Tab by mouth every day.      Probiotic Product (PROBIOTIC-10 PO) Take  by mouth.       No current Epic-ordered facility-administered medications on file.     Hydrocortisone sod succinate, Sumatriptan, Infliximab, Cedarwood oil, Grass extracts [gramineae pollens], Lidocaine, Poison oak extract [extract of poison oak], Pollen extract,  "Prochlorperazine, Promethazine, Zolmitriptan, and Azathioprine    Health Maintenance: Completed    ROS: see hpi  Gen: no fevers/chills  Pulm: no sob, no cough  CV: no chest pain, no palpitations, no edema  GI: no nausea/vomiting, no diarrhea  Skin: no rash    Objective:   Exam:  /78 (BP Location: Left arm, Patient Position: Sitting, BP Cuff Size: Adult long)   Pulse 73   Temp 36.6 °C (97.9 °F) (Temporal)   Ht 1.626 m (5' 4\")   Wt 88.5 kg (195 lb)   SpO2 98%   BMI 33.47 kg/m²    Body mass index is 33.47 kg/m².    Gen: Alert and oriented, No apparent distress.  HEENT: Head atraumatic, normocephalic. Pupils equal and round.  Neck: Neck is supple without lymphadenopathy.   Lungs: Normal effort, CTA bilaterally, no wheezes, rhonchi, or rales  CV: Regular rate and rhythm. No murmurs, rubs, or gallops.  ABD: +BS. Non-tender, non-distended. No rebound, rigidity, or guarding.  Ext: No clubbing, cyanosis, edema.    Assessment & Plan:     36 y.o. female with the following -     1. Encounter to establish care  PMH/PSH/FH/Social history reviewed.  Vaccinations discussed.  Previous records and labs reviewed.    2. Intractable migraine with aura without status migrainosus  Chronic, uncontrolled.  We will start Topamax 25 mg daily and titrate up to twice a day after 1 week.     For migraine prophylaxis, please take over the counter:  Magnesium Oxide 400 mg tablet daily  Riboflavin 400 mg capsule daily  CoQ10 100 mg twice a day    - Referral to Neurology  - topiramate (TOPAMAX) 25 MG capsule; Take 1 Capsule by mouth 2 times a day.  Dispense: 60 Capsule; Refill: 0    3. Crohn's disease of small intestine without complication (HCC)  Chronic, controlled and stable. Continue current regimen.  Follow-up with GI as scheduled.  - CBC WITH DIFFERENTIAL; Future    4. Irregular menses  With history of ovarian cyst, rule out PCOS.  - ANTI-MULLERIAN HORMONE([facility]); Future  - DHEA; Future  - ESTRADIOL; Future  - FSH/LH; Future  - " TESTOSTERONE, FREE AND TOTAL; Future  - PROLACTIN; Future  - PROGESTERONE; Future    5. History of ovarian cyst  - ANTI-MULLERIAN HORMONE(AMH); Future  - DHEA; Future  - ESTRADIOL; Future  - FSH/LH; Future  - TESTOSTERONE, FREE AND TOTAL; Future  - PROLACTIN; Future  - PROGESTERONE; Future    6. Obesity (BMI 30-39.9)  - Patient identified as having weight management issue.  Appropriate orders and counseling given.  - Comp Metabolic Panel; Future  - TSH WITH REFLEX TO FT4; Future  - Lipid Profile; Future  - HEMOGLOBIN A1C; Future    7. Genetic testing  - Referral to Genetic Research Studies    8. Screening for thyroid disorder  - TSH WITH REFLEX TO FT4; Future    9. Screening cholesterol level  - Lipid Profile; Future    10. Diabetes mellitus screening  - Comp Metabolic Panel; Future  - HEMOGLOBIN A1C; Future    11. Encounter for vitamin deficiency screening  - VITAMIN B12; Future  - VITAMIN D,25 HYDROXY (DEFICIENCY); Future    12. Screening for deficiency anemia  - CBC WITH DIFFERENTIAL; Future  - VITAMIN B12; Future    13. Need for vaccination  - INFLUENZA VACCINE QUAD INJ (PF)      Return in about 1 month (around 11/3/2022) for Follow-up.    Lena Randhawa PA-C ([place])  Physician Assistant Certified  [facility]    Please note that this dictation was created using voice recognition software. I have made every reasonable attempt to correct obvious errors, but I expect that there are errors of grammar and possibly content that I did not discover before finalizing the note.

## 2022-10-03 NOTE — ASSESSMENT & PLAN NOTE
History of ovarian cysts and irregular menses.  Currently off birth control.  No previous work-up for PCOS.

## 2022-10-03 NOTE — ASSESSMENT & PLAN NOTE
Chronic, controlled and stable. Managed by GI consultants - Dr. Pompa  On Remicade since 2011, last c-scope 2019.

## 2022-10-03 NOTE — PATIENT INSTRUCTIONS
For migraine prophylaxis, please take over the counter:    Magnesium Oxide 400 mg tablet daily  Riboflavin 400 mg capsule daily  CoQ10 100 mg twice a day

## 2022-10-04 ENCOUNTER — HOSPITAL ENCOUNTER (OUTPATIENT)
Dept: LAB | Facility: MEDICAL CENTER | Age: 37
End: 2022-10-04
Attending: PHYSICIAN ASSISTANT
Payer: COMMERCIAL

## 2022-10-04 DIAGNOSIS — Z13.220 SCREENING CHOLESTEROL LEVEL: ICD-10-CM

## 2022-10-04 DIAGNOSIS — N92.6 IRREGULAR MENSES: ICD-10-CM

## 2022-10-04 DIAGNOSIS — Z13.0 SCREENING FOR DEFICIENCY ANEMIA: ICD-10-CM

## 2022-10-04 DIAGNOSIS — Z13.21 ENCOUNTER FOR VITAMIN DEFICIENCY SCREENING: ICD-10-CM

## 2022-10-04 DIAGNOSIS — E66.9 OBESITY (BMI 30-39.9): ICD-10-CM

## 2022-10-04 DIAGNOSIS — K50.00 CROHN'S DISEASE OF SMALL INTESTINE WITHOUT COMPLICATION (HCC): ICD-10-CM

## 2022-10-04 DIAGNOSIS — Z87.42 HISTORY OF OVARIAN CYST: ICD-10-CM

## 2022-10-04 DIAGNOSIS — Z13.29 SCREENING FOR THYROID DISORDER: ICD-10-CM

## 2022-10-04 DIAGNOSIS — Z13.1 DIABETES MELLITUS SCREENING: ICD-10-CM

## 2022-10-04 LAB
25(OH)D3 SERPL-MCNC: 29 NG/ML (ref 30–100)
ALBUMIN SERPL BCP-MCNC: 4.1 G/DL (ref 3.2–4.9)
ALBUMIN/GLOB SERPL: 1.6 G/DL
ALP SERPL-CCNC: 65 U/L (ref 30–99)
ALT SERPL-CCNC: 17 U/L (ref 2–50)
ANION GAP SERPL CALC-SCNC: 10 MMOL/L (ref 7–16)
AST SERPL-CCNC: 16 U/L (ref 12–45)
BASOPHILS # BLD AUTO: 1 % (ref 0–1.8)
BASOPHILS # BLD: 0.08 K/UL (ref 0–0.12)
BILIRUB SERPL-MCNC: 0.4 MG/DL (ref 0.1–1.5)
BUN SERPL-MCNC: 11 MG/DL (ref 8–22)
CALCIUM SERPL-MCNC: 8.8 MG/DL (ref 8.5–10.5)
CHLORIDE SERPL-SCNC: 105 MMOL/L (ref 96–112)
CHOLEST SERPL-MCNC: 184 MG/DL (ref 100–199)
CO2 SERPL-SCNC: 23 MMOL/L (ref 20–33)
CREAT SERPL-MCNC: 0.67 MG/DL (ref 0.5–1.4)
EOSINOPHIL # BLD AUTO: 0.29 K/UL (ref 0–0.51)
EOSINOPHIL NFR BLD: 3.5 % (ref 0–6.9)
ERYTHROCYTE [DISTWIDTH] IN BLOOD BY AUTOMATED COUNT: 39.7 FL (ref 35.9–50)
EST. AVERAGE GLUCOSE BLD GHB EST-MCNC: 88 MG/DL
ESTRADIOL SERPL-MCNC: 81.4 PG/ML
FASTING STATUS PATIENT QL REPORTED: NORMAL
FSH SERPL-ACNC: 9.1 MIU/ML
GFR SERPLBLD CREATININE-BSD FMLA CKD-EPI: 117 ML/MIN/1.73 M 2
GLOBULIN SER CALC-MCNC: 2.5 G/DL (ref 1.9–3.5)
GLUCOSE SERPL-MCNC: 90 MG/DL (ref 65–99)
HBA1C MFR BLD: 4.7 % (ref 4–5.6)
HCT VFR BLD AUTO: 42.8 % (ref 37–47)
HDLC SERPL-MCNC: 52 MG/DL
HGB BLD-MCNC: 14.9 G/DL (ref 12–16)
IMM GRANULOCYTES # BLD AUTO: 0.03 K/UL (ref 0–0.11)
IMM GRANULOCYTES NFR BLD AUTO: 0.4 % (ref 0–0.9)
LDLC SERPL CALC-MCNC: 111 MG/DL
LH SERPL-ACNC: 19.1 IU/L
LYMPHOCYTES # BLD AUTO: 2.2 K/UL (ref 1–4.8)
LYMPHOCYTES NFR BLD: 26.6 % (ref 22–41)
MCH RBC QN AUTO: 32 PG (ref 27–33)
MCHC RBC AUTO-ENTMCNC: 34.8 G/DL (ref 33.6–35)
MCV RBC AUTO: 91.8 FL (ref 81.4–97.8)
MONOCYTES # BLD AUTO: 0.51 K/UL (ref 0–0.85)
MONOCYTES NFR BLD AUTO: 6.2 % (ref 0–13.4)
NEUTROPHILS # BLD AUTO: 5.17 K/UL (ref 2–7.15)
NEUTROPHILS NFR BLD: 62.3 % (ref 44–72)
NRBC # BLD AUTO: 0 K/UL
NRBC BLD-RTO: 0 /100 WBC
PLATELET # BLD AUTO: 313 K/UL (ref 164–446)
PMV BLD AUTO: 10.4 FL (ref 9–12.9)
POTASSIUM SERPL-SCNC: 4 MMOL/L (ref 3.6–5.5)
PROGEST SERPL-MCNC: 1.29 NG/ML
PROLACTIN SERPL-MCNC: 19.5 NG/ML (ref 2.8–26)
PROT SERPL-MCNC: 6.6 G/DL (ref 6–8.2)
RBC # BLD AUTO: 4.66 M/UL (ref 4.2–5.4)
SODIUM SERPL-SCNC: 138 MMOL/L (ref 135–145)
TRIGL SERPL-MCNC: 106 MG/DL (ref 0–149)
TSH SERPL DL<=0.005 MIU/L-ACNC: 2.02 UIU/ML (ref 0.38–5.33)
VIT B12 SERPL-MCNC: 579 PG/ML (ref 211–911)
WBC # BLD AUTO: 8.3 K/UL (ref 4.8–10.8)

## 2022-10-04 PROCEDURE — 83036 HEMOGLOBIN GLYCOSYLATED A1C: CPT

## 2022-10-04 PROCEDURE — 82306 VITAMIN D 25 HYDROXY: CPT

## 2022-10-04 PROCEDURE — 84270 ASSAY OF SEX HORMONE GLOBUL: CPT

## 2022-10-04 PROCEDURE — 82607 VITAMIN B-12: CPT

## 2022-10-04 PROCEDURE — 85025 COMPLETE CBC W/AUTO DIFF WBC: CPT

## 2022-10-04 PROCEDURE — 83001 ASSAY OF GONADOTROPIN (FSH): CPT

## 2022-10-04 PROCEDURE — 83520 IMMUNOASSAY QUANT NOS NONAB: CPT

## 2022-10-04 PROCEDURE — 82670 ASSAY OF TOTAL ESTRADIOL: CPT

## 2022-10-04 PROCEDURE — 80053 COMPREHEN METABOLIC PANEL: CPT

## 2022-10-04 PROCEDURE — 82626 DEHYDROEPIANDROSTERONE: CPT

## 2022-10-04 PROCEDURE — 84402 ASSAY OF FREE TESTOSTERONE: CPT

## 2022-10-04 PROCEDURE — 80061 LIPID PANEL: CPT

## 2022-10-04 PROCEDURE — 84403 ASSAY OF TOTAL TESTOSTERONE: CPT

## 2022-10-04 PROCEDURE — 83002 ASSAY OF GONADOTROPIN (LH): CPT

## 2022-10-04 PROCEDURE — 84443 ASSAY THYROID STIM HORMONE: CPT

## 2022-10-04 PROCEDURE — 84144 ASSAY OF PROGESTERONE: CPT

## 2022-10-04 PROCEDURE — 84146 ASSAY OF PROLACTIN: CPT

## 2022-10-04 PROCEDURE — 36415 COLL VENOUS BLD VENIPUNCTURE: CPT

## 2022-10-08 LAB — MIS SERPL-MCNC: 2.53 NG/ML (ref 0.18–11.71)

## 2022-10-10 LAB
DHEA SERPL-MCNC: 9.11 NG/ML (ref 1.33–7.78)
SHBG SERPL-SCNC: 52 NMOL/L (ref 25–122)
TESTOST FREE SERPL-MCNC: 4.7 PG/ML (ref 1.3–9.2)
TESTOST SERPL-MCNC: 37 NG/DL (ref 9–55)

## 2022-10-13 ENCOUNTER — OFFICE VISIT (OUTPATIENT)
Dept: MEDICAL GROUP | Facility: IMAGING CENTER | Age: 37
End: 2022-10-13
Payer: COMMERCIAL

## 2022-10-13 VITALS
BODY MASS INDEX: 33.32 KG/M2 | OXYGEN SATURATION: 98 % | SYSTOLIC BLOOD PRESSURE: 120 MMHG | TEMPERATURE: 96.9 F | WEIGHT: 195.2 LBS | RESPIRATION RATE: 17 BRPM | DIASTOLIC BLOOD PRESSURE: 74 MMHG | HEIGHT: 64 IN | HEART RATE: 63 BPM

## 2022-10-13 DIAGNOSIS — E28.2 PCOS (POLYCYSTIC OVARIAN SYNDROME): ICD-10-CM

## 2022-10-13 DIAGNOSIS — G43.119 INTRACTABLE MIGRAINE WITH AURA WITHOUT STATUS MIGRAINOSUS: ICD-10-CM

## 2022-10-13 PROCEDURE — 99214 OFFICE O/P EST MOD 30 MIN: CPT | Performed by: PHYSICIAN ASSISTANT

## 2022-10-13 RX ORDER — TOPIRAMATE SPINKLE 25 MG/1
25 CAPSULE ORAL 2 TIMES DAILY
Qty: 60 CAPSULE | Refills: 0 | Status: SHIPPED | OUTPATIENT
Start: 2022-10-13 | End: 2022-12-01

## 2022-10-13 RX ORDER — SPIRONOLACTONE 50 MG/1
50 TABLET, FILM COATED ORAL DAILY
Qty: 90 TABLET | Refills: 0 | Status: SHIPPED | OUTPATIENT
Start: 2022-10-13 | End: 2023-01-09

## 2022-10-13 RX ORDER — NORETHINDRONE ACETATE AND ETHINYL ESTRADIOL, ETHINYL ESTRADIOL AND FERROUS FUMARATE 1MG-10(24)
1 KIT ORAL DAILY
Qty: 28 TABLET | Refills: 11 | Status: SHIPPED | OUTPATIENT
Start: 2022-10-13 | End: 2023-01-17

## 2022-10-13 RX ORDER — VITAMIN B COMPLEX
2000 TABLET ORAL DAILY
COMMUNITY

## 2022-10-13 ASSESSMENT — FIBROSIS 4 INDEX: FIB4 SCORE: 0.43

## 2022-10-13 ASSESSMENT — PAIN SCALES - GENERAL: PAINLEVEL: NO PAIN

## 2022-10-13 NOTE — ASSESSMENT & PLAN NOTE
Patient's labs consistent with PCOS, history of hirsutism, difficulty losing weight, irregular menses, elevated anti-müllerian hormone.  Blood sugar and lipids within normal limits.

## 2022-10-13 NOTE — PATIENT INSTRUCTIONS
It was a pleasure meeting with you today at [facility]!    Your medical history/records and medications were reviewed today.     Please review my practice information below:    If you have any prescription refill requests, please send them via [employer]onJaxt or discuss with your provider at the start of your office visits. Please allow 3-5 business days for lab and testing review and you will be contacted via MyLikes with those results, or if advised to make a follow up appointment regarding those results, then please do so.     Once resulted, your lab/test/imaging results will show up automatically in your MyChart. Please wait for my interpretation and recommendations prior to viewing your results to avoid any unnecessary confusion or misinterpretation. I will address all of the lab values that I interpret as abnormal and message you accordingly on your MyChart. I will always send you a message about your results even if they are normal. If you do not hear back from me within 5-7 business days after completing your tests, then please send me a message on FonJaxt so I can obtain your results (especially if you went to an outside lab or imaging center - [facility], [facility], etc).     If you have any additional questions or concerns beyond my interpretation of your results, please make an appointment with me to discuss in further detail.    Please only use the MyLikes messaging system for questions regarding your most recent appointment or if advised to use otherwise (glucose or blood pressure reporting).     If you have any new problems or concerns, you must make an appointment to discuss. This includes any referral requests, lab requests (unless advised to notify me for pre-appt labs), medication side effects, or request for medication adjustments.     Please arrive 15 minutes prior to your appointment time to complete your check-in and intake with the medical assistant.      Thank you,    Lena Helm) Edis  ZANDRA  Physician Assistant Certified  [facility]    -----------------------------------------------------------------    Attn: Patients of [facility]:    In an effort to continue to provide excellent and efficient care to our patients, it is vital that we continue to use our resources appropriately. With that, this is a reminder that [employer] is used for prescription refill requests, test results, virtual visits, and chart review only.     Any new questions, concerns/conditions, lab/imaging requests, medication adjustments, new prescriptions, or referral requests do require an appointment (virtually or in person), unless discussed otherwise at your most recent appointment.     Thank you for your understanding,    [facility]

## 2022-10-13 NOTE — PROGRESS NOTES
Subjective:     CC:   Chief Complaint   Patient presents with    Follow-Up    Lab Results     10/04/2022       HPI:   Magaly presents today to discuss:    PCOS (polycystic ovarian syndrome)  Patient's labs consistent with PCOS, history of hirsutism, difficulty losing weight, irregular menses, elevated anti-müllerian hormone.  Blood sugar and lipids within normal limits.    Intractable migraine with aura without status migrainosus  Patient states that since starting Topamax she has not had a single migraine.  She is overall very happy with her current regimen.  Now on the twice daily dosing.  No side effects.      Past Medical History:   Diagnosis Date    Cancer (HCC)     Crohn's disease (HCC) 20111    Hip pain, bilateral     Hypertension     IBD (inflammatory bowel disease)     Kidney stone 2008 to 2015    4x (mostly on right side).     Migraines 1998    MRSA infection     sinus infection    Ovarian cyst     age 18, burst    Recurrent sinusitis 1/14/2020    -Patient prefers antibiotic treatment at this time.  We have had an antibiotic stewardship conversation.  Clindamycin is the next step given that she failed Augmentin and has confirmed MRSA on nasal swab.  However she has Crohn's disease and not comfortable prescribing this due to increased risk of C. difficile infection.  We will now try Bactrim and doxycycline for 10 days. -Patient aware of most     Family History   Problem Relation Age of Onset    Breast Cancer Mother 57        BRCA negative    Other Mother         MS, fibroids/hysterectomy    GI Disease Maternal Aunt         Crohn's    Cancer Maternal Grandmother         uterine or fibrioids?, breast, skin, lung    Lung Disease Maternal Grandmother         emphysema    Psychiatric Illness Maternal Grandmother         suicide    GI Disease Maternal Grandfather         Crohn's     Past Surgical History:   Procedure Laterality Date    LUMBAR TRANSFORAMINAL EPIDURAL STEROID INJECTION Right 5/17/2021    Procedure:  RIGHT L4-5 transforaminal epidural steroid injection with sedation;  Surgeon: Godfrey Vigil M.D.;  [facility];  Service: Pain Management    DENTAL EXTRACTION(S)       Social History     Tobacco Use    Smoking status: Never    Smokeless tobacco: Never   Vaping Use    Vaping Use: Never used   Substance Use Topics    Alcohol use: Yes     Comment: once a week    Drug use: Yes     Frequency: 1.0 times per week     Types: Marijuana     Comment: past hallucinogens, smoking (vape)     Social History     Social History Narrative    Not on file     Current Outpatient Medications Ordered in Epic   Medication Sig Dispense Refill    vitamin D3 (CHOLECALCIFEROL) 1000 Unit (25 mcg) Tab Take 2,000 Units by mouth every day.      Norethin-Eth Estrad-Fe Biphas (LO LOESTRIN FE) 1 MG-10 MCG / 10 MCG Tab Take 1 Tablet by mouth every day. 28 Tablet 11    spironolactone (ALDACTONE) 50 MG Tab Take 1 Tablet by mouth every day. 90 Tablet 0    topiramate (TOPAMAX) 25 MG capsule Take 1 Capsule by mouth 2 times a day. 60 Capsule 0    Acetaminophen (TYLENOL PO) Take  by mouth.      IBUPROFEN PO Take  by mouth.      inFLIXimab (REMICADE) 100 MG Recon Soln Infuse  into a venous catheter.      Adapalene 0.3 % Gel       Omega-3 Fatty Acids (FISH OIL) 1000 MG Cap capsule Take 1,000 mg by mouth every day.      therapeutic multivitamin-minerals (THERAGRAN-M) Tab Take 1 Tab by mouth every day.      Probiotic Product (PROBIOTIC-10 PO) Take  by mouth.       No current Epic-ordered facility-administered medications on file.     Hydrocortisone sod succinate, Sumatriptan, Infliximab, Cedarwood oil, Grass extracts [gramineae pollens], Lidocaine, Poison oak extract [extract of poison oak], Pollen extract, Prochlorperazine, Promethazine, Zolmitriptan, and Azathioprine    Health Maintenance: Completed    ROS: see hpi  Gen: no fevers/chills  Pulm: no sob, no cough  CV: no chest pain, no palpitations, no edema  GI: no  "nausea/vomiting, no diarrhea  Skin: no rash    Objective:   Exam:  /74 (BP Location: Left arm, Patient Position: Sitting, BP Cuff Size: Adult)   Pulse 63   Temp 36.1 °C (96.9 °F) (Temporal)   Resp 17   Ht 1.626 m (5' 4\")   Wt 88.5 kg (195 lb 3.2 oz)   SpO2 98%   BMI 33.51 kg/m²    Body mass index is 33.51 kg/m².    Gen: Alert and oriented, No apparent distress.  HEENT: Head atraumatic, normocephalic. Pupils equal and round.  Ext: No clubbing, cyanosis, edema.    Assessment & Plan:     37 y.o. female with the following -     1. PCOS (polycystic ovarian syndrome)  Chronic, will start spironolactone due to symptoms of hirsutism.  Side effect such as renal insufficiency and elevated potassium discussed with patient.  Make sure to stay well-hydrated.  We will check BMP in 3 months.  Patient also wishes to restart Lo-Loestrin.  Aware of the increased risk of cardiovascular disease and CVA with long-term OCP use, especially with a history of migraine with aura.  I will have her start spironolactone for this first month and if no improvement in PCOS symptoms may then start low Loestrin.  - Norethin-Eth Estrad-Fe Biphas (LO LOESTRIN FE) 1 MG-10 MCG / 10 MCG Tab; Take 1 Tablet by mouth every day.  Dispense: 28 Tablet; Refill: 11  - spironolactone (ALDACTONE) 50 MG Tab; Take 1 Tablet by mouth every day.  Dispense: 90 Tablet; Refill: 0  - Basic Metabolic Panel; Future    2. Intractable migraine with aura without status migrainosus  Chronic, improved with Topamax and the regimen of:   Magnesium Oxide 400 mg tablet daily  Riboflavin 400 mg capsule daily  CoQ10 100 mg twice a day    Reassess in 3 months.  - topiramate (TOPAMAX) 25 MG capsule; Take 1 Capsule by mouth 2 times a day.  Dispense: 60 Capsule; Refill: 0    Return in about 3 months (around 1/13/2023) for Follow-up.    Lena Randhawa PA-C ([place])  Physician Assistant Certified  [facility]    Please note that this dictation was created using voice " recognition software. I have made every reasonable attempt to correct obvious errors, but I expect that there are errors of grammar and possibly content that I did not discover before finalizing the note.

## 2022-10-13 NOTE — ASSESSMENT & PLAN NOTE
Patient states that since starting Topamax she has not had a single migraine.  She is overall very happy with her current regimen.  Now on the twice daily dosing.  No side effects.

## 2022-10-14 ENCOUNTER — RESEARCH ENCOUNTER (OUTPATIENT)
Dept: RESEARCH | Facility: WORKSITE | Age: 37
End: 2022-10-14
Attending: PHYSICIAN ASSISTANT
Payer: COMMERCIAL

## 2022-10-14 ENCOUNTER — TELEPHONE (OUTPATIENT)
Dept: HEALTH INFORMATION MANAGEMENT | Facility: OTHER | Age: 37
End: 2022-10-14

## 2022-10-14 DIAGNOSIS — Z00.6 RESEARCH STUDY PATIENT: Primary | ICD-10-CM

## 2022-11-14 LAB
APOB+LDLR+PCSK9 GENE MUT ANL BLD/T: NOT DETECTED
BRCA1+BRCA2 DEL+DUP + FULL MUT ANL BLD/T: NOT DETECTED
MLH1+MSH2+MSH6+PMS2 GN DEL+DUP+FUL M: NOT DETECTED

## 2022-12-20 ENCOUNTER — OFFICE VISIT (OUTPATIENT)
Dept: MEDICAL GROUP | Facility: IMAGING CENTER | Age: 37
End: 2022-12-20
Payer: COMMERCIAL

## 2022-12-20 VITALS
RESPIRATION RATE: 20 BRPM | BODY MASS INDEX: 34.21 KG/M2 | TEMPERATURE: 97.7 F | HEART RATE: 84 BPM | HEIGHT: 64 IN | OXYGEN SATURATION: 97 % | WEIGHT: 200.4 LBS | SYSTOLIC BLOOD PRESSURE: 98 MMHG | DIASTOLIC BLOOD PRESSURE: 66 MMHG

## 2022-12-20 DIAGNOSIS — J40 BRONCHITIS: ICD-10-CM

## 2022-12-20 DIAGNOSIS — J98.01 BRONCHOSPASM: ICD-10-CM

## 2022-12-20 PROBLEM — R05.1 ACUTE COUGH: Status: ACTIVE | Noted: 2022-12-20

## 2022-12-20 PROCEDURE — 94640 AIRWAY INHALATION TREATMENT: CPT | Performed by: PHYSICIAN ASSISTANT

## 2022-12-20 PROCEDURE — 99213 OFFICE O/P EST LOW 20 MIN: CPT | Performed by: PHYSICIAN ASSISTANT

## 2022-12-20 RX ORDER — ALBUTEROL SULFATE 90 UG/1
2 AEROSOL, METERED RESPIRATORY (INHALATION) EVERY 4 HOURS PRN
Qty: 1 EACH | Refills: 0 | Status: SHIPPED | OUTPATIENT
Start: 2022-12-20 | End: 2024-02-13

## 2022-12-20 RX ORDER — CODEINE PHOSPHATE AND GUAIFENESIN 10; 100 MG/5ML; MG/5ML
5 SOLUTION ORAL EVERY 6 HOURS PRN
Qty: 60 ML | Refills: 0 | Status: SHIPPED | OUTPATIENT
Start: 2022-12-20 | End: 2022-12-23

## 2022-12-20 RX ORDER — IPRATROPIUM BROMIDE AND ALBUTEROL SULFATE 2.5; .5 MG/3ML; MG/3ML
3 SOLUTION RESPIRATORY (INHALATION) ONCE
Status: COMPLETED | OUTPATIENT
Start: 2022-12-20 | End: 2022-12-20

## 2022-12-20 RX ORDER — METHYLPREDNISOLONE 4 MG/1
TABLET ORAL
Qty: 21 TABLET | Refills: 0 | Status: SHIPPED | OUTPATIENT
Start: 2022-12-20 | End: 2023-01-17

## 2022-12-20 RX ADMIN — IPRATROPIUM BROMIDE AND ALBUTEROL SULFATE 3 ML: 2.5; .5 SOLUTION RESPIRATORY (INHALATION) at 09:19

## 2022-12-20 ASSESSMENT — FIBROSIS 4 INDEX: FIB4 SCORE: 0.43

## 2022-12-20 NOTE — PROGRESS NOTES
Subjective:     CC:   Chief Complaint   Patient presents with    Cough     Pt report Persistin about 3 weeks headache  chest pressure    Sinusitis     congestion       HPI:   Magaly presents today to discuss:    Acute cough  Pt admits to persistent cough with coughing fits x 3 weeks. Has been taking nyquil and mucinex. Cough is productive. C/O sob and wheeze. Admits to fatigue. No fever.       Past Medical History:   Diagnosis Date    Cancer (HCC)     Crohn's disease (HCC) 20111    Hip pain, bilateral     Hypertension     IBD (inflammatory bowel disease)     Kidney stone 2008 to 2015    4x (mostly on right side).     Migraines 1998    MRSA infection     sinus infection    Ovarian cyst     age 18, burst    Recurrent sinusitis 1/14/2020    -Patient prefers antibiotic treatment at this time.  We have had an antibiotic stewardship conversation.  Clindamycin is the next step given that she failed Augmentin and has confirmed MRSA on nasal swab.  However she has Crohn's disease and not comfortable prescribing this due to increased risk of C. difficile infection.  We will now try Bactrim and doxycycline for 10 days. -Patient aware of most     Family History   Problem Relation Age of Onset    Breast Cancer Mother 57        BRCA negative    Other Mother         MS, fibroids/hysterectomy    GI Disease Maternal Aunt         Crohn's    Cancer Maternal Grandmother         uterine or fibrioids?, breast, skin, lung    Lung Disease Maternal Grandmother         emphysema    Psychiatric Illness Maternal Grandmother         suicide    GI Disease Maternal Grandfather         Crohn's     Past Surgical History:   Procedure Laterality Date    LUMBAR TRANSFORAMINAL EPIDURAL STEROID INJECTION Right 5/17/2021    Procedure: RIGHT L4-5 transforaminal epidural steroid injection with sedation;  Surgeon: Godfrey Vigil M.D.;  [facility];  Service: Pain Management    DENTAL EXTRACTION(S)       Social History     Tobacco  Use    Smoking status: Never    Smokeless tobacco: Never   Vaping Use    Vaping Use: Never used   Substance Use Topics    Alcohol use: Yes     Comment: once a week    Drug use: Yes     Frequency: 1.0 times per week     Types: Marijuana     Comment: past hallucinogens, smoking (vape)     Social History     Social History Narrative    Not on file     Current Outpatient Medications Ordered in Epic   Medication Sig Dispense Refill    methylPREDNISolone (MEDROL DOSEPAK) 4 MG Tablet Therapy Pack As directed on the packaging label. 21 Tablet 0    albuterol 108 (90 Base) MCG/ACT Aero Soln inhalation aerosol Inhale 2 Puffs every four hours as needed for Shortness of Breath. 1 Each 0    guaifenesin-codeine (ROBITUSSIN AC) Solution oral solution Take 5 mL by mouth every 6 hours as needed for Cough for up to 3 days. 60 mL 0    topiramate (TOPAMAX) 25 MG capsule TAKE 1 CAPSULE BY MOUTH TWICE A  Capsule 0    vitamin D3 (CHOLECALCIFEROL) 1000 Unit (25 mcg) Tab Take 2,000 Units by mouth every day.      Norethin-Eth Estrad-Fe Biphas (LO LOESTRIN FE) 1 MG-10 MCG / 10 MCG Tab Take 1 Tablet by mouth every day. 28 Tablet 11    spironolactone (ALDACTONE) 50 MG Tab Take 1 Tablet by mouth every day. 90 Tablet 0    Acetaminophen (TYLENOL PO) Take  by mouth.      IBUPROFEN PO Take  by mouth.      inFLIXimab (REMICADE) 100 MG Recon Soln Infuse  into a venous catheter.      Adapalene 0.3 % Gel       Omega-3 Fatty Acids (FISH OIL) 1000 MG Cap capsule Take 1,000 mg by mouth every day.      therapeutic multivitamin-minerals (THERAGRAN-M) Tab Take 1 Tab by mouth every day.      Probiotic Product (PROBIOTIC-10 PO) Take  by mouth.       No current Epic-ordered facility-administered medications on file.     Hydrocortisone sod succinate, Sumatriptan, Infliximab, Cedarwood oil, Grass extracts [gramineae pollens], Lidocaine, Poison oak extract [extract of poison oak], Pollen extract, Prochlorperazine, Promethazine, Zolmitriptan, and  "Azathioprine    PMH/PSH/FH/Social history reviewed.  Vaccinations discussed.  Previous records and labs reviewed. Discussed age appropriate anticipatory guidance.    ROS: see hpi  Gen: no fevers/chills  CV: no chest pain, no palpitations, no edema  GI: no nausea/vomiting, no diarrhea  Skin: no rash    Objective:   Exam:  BP (!) 98/66 (BP Location: Left arm, Patient Position: Standing, BP Cuff Size: Adult)   Pulse 84   Temp 36.5 °C (97.7 °F) (Temporal)   Resp 20   Ht 1.626 m (5' 4\")   Wt 90.9 kg (200 lb 6.4 oz)   SpO2 97%   BMI 34.40 kg/m²    Body mass index is 34.4 kg/m².    Gen: Alert and oriented, No apparent distress.  HEENT: Head atraumatic, normocephalic. Pupils equal and round.  Neck: Neck is supple without lymphadenopathy.   Lungs: Normal effort, bronchospasm noted with deep inspiration. Substernal wheeze noted.   CV: Regular rate and rhythm. No murmurs, rubs, or gallops.  ABD: +BS. Non-tender, non-distended. No rebound, rigidity, or guarding.  Ext: No clubbing, cyanosis, edema.    Assessment & Plan:     37 y.o. female with the following -     1. Bronchitis  My recommendations for an upper respiratory infection:  - Use a humidifier, especially at night. Cold or warm water humidifiers have the same effect.  - Nicola Med squeeze bottle sinus rinses or plain nasal saline twice a day.  - Hot tea + honey + fresh lemon juice  - Honey by itself has been shown to help provide cough relief  - Frequent hand washing, rest, hydration  - OTC meds as recommended today during your visit  - Side effects of steroids and albuterol including jitteriness and increased heart rate.  - ipratropium-albuterol (DUONEB) nebulizer solution  - methylPREDNISolone (MEDROL DOSEPAK) 4 MG Tablet Therapy Pack; As directed on the packaging label.  Dispense: 21 Tablet; Refill: 0  - albuterol 108 (90 Base) MCG/ACT Aero Soln inhalation aerosol; Inhale 2 Puffs every four hours as needed for Shortness of Breath.  Dispense: 1 Each; Refill: 0  - " guaifenesin-codeine (ROBITUSSIN AC) Solution oral solution; Take 5 mL by mouth every 6 hours as needed for Cough for up to 3 days.  Dispense: 60 mL; Refill: 0  - DX-CHEST-2 VIEWS; Future if no improvement after completion of steroids or if symptoms worsen in the interim.    2. Bronchospasm  - methylPREDNISolone (MEDROL DOSEPAK) 4 MG Tablet Therapy Pack; As directed on the packaging label.  Dispense: 21 Tablet; Refill: 0  - albuterol 108 (90 Base) MCG/ACT Aero Soln inhalation aerosol; Inhale 2 Puffs every four hours as needed for Shortness of Breath.  Dispense: 1 Each; Refill: 0  - guaifenesin-codeine (ROBITUSSIN AC) Solution oral solution; Take 5 mL by mouth every 6 hours as needed for Cough for up to 3 days.  Dispense: 60 mL; Refill: 0  - DX-CHEST-2 VIEWS; Future    Return if symptoms worsen or fail to improve.    Lena Randhawa PA-C ([place])  Physician Assistant Certified  [facility]    Please note that this dictation was created using voice recognition software. I have made every reasonable attempt to correct obvious errors, but I expect that there are errors of grammar and possibly content that I did not discover before finalizing the note.

## 2022-12-20 NOTE — ASSESSMENT & PLAN NOTE
Pt admits to persistent cough with coughing fits x 3 weeks. Has been taking nyquil and mucinex. Cough is productive. C/O sob and wheeze. Admits to fatigue. No fever.

## 2022-12-20 NOTE — PATIENT INSTRUCTIONS
My recommendations for an upper respiratory infection:  - Use a humidifier, especially at night. Cold or warm water humidifiers have the same effect.  - Nicola Med squeeze bottle sinus rinses or plain nasal saline twice a day.  - Hot tea + honey + fresh lemon juice  - Honey by itself has been shown to help provide cough relief  - Frequent hand washing, rest, hydration  - OTC meds as recommended today during your visit

## 2022-12-21 DIAGNOSIS — J40 BRONCHITIS: ICD-10-CM

## 2022-12-21 DIAGNOSIS — J98.01 BRONCHOSPASM: ICD-10-CM

## 2022-12-21 NOTE — TELEPHONE ENCOUNTER
Received request via: Pharmacy    Was the patient seen in the last year in this department? Yes    Does the patient have an active prescription (recently filled or refills available) for medication(s) requested? No    Does the patient have halfway Plus and need 100 day supply (blood pressure, diabetes and cholesterol meds only)? Patient does not have SCP    Pharmacy comment: Medication on backorder need alternative.

## 2022-12-22 RX ORDER — CODEINE PHOSPHATE AND GUAIFENESIN 10; 100 MG/5ML; MG/5ML
5 SOLUTION ORAL EVERY 6 HOURS PRN
Qty: 60 ML | Refills: 0 | OUTPATIENT
Start: 2022-12-22 | End: 2022-12-25

## 2023-01-13 ENCOUNTER — HOSPITAL ENCOUNTER (OUTPATIENT)
Dept: LAB | Facility: MEDICAL CENTER | Age: 38
End: 2023-01-13
Attending: PHYSICIAN ASSISTANT
Payer: COMMERCIAL

## 2023-01-13 LAB
ANION GAP SERPL CALC-SCNC: 10 MMOL/L (ref 7–16)
BASOPHILS # BLD AUTO: 0.9 % (ref 0–1.8)
BASOPHILS # BLD: 0.08 K/UL (ref 0–0.12)
BUN SERPL-MCNC: 11 MG/DL (ref 8–22)
CALCIUM SERPL-MCNC: 9 MG/DL (ref 8.5–10.5)
CHLORIDE SERPL-SCNC: 105 MMOL/L (ref 96–112)
CO2 SERPL-SCNC: 22 MMOL/L (ref 20–33)
CREAT SERPL-MCNC: 0.86 MG/DL (ref 0.5–1.4)
CRP SERPL HS-MCNC: <0.3 MG/DL (ref 0–0.75)
EOSINOPHIL # BLD AUTO: 0.19 K/UL (ref 0–0.51)
EOSINOPHIL NFR BLD: 2.1 % (ref 0–6.9)
ERYTHROCYTE [DISTWIDTH] IN BLOOD BY AUTOMATED COUNT: 40.6 FL (ref 35.9–50)
GFR SERPLBLD CREATININE-BSD FMLA CKD-EPI: 90 ML/MIN/1.73 M 2
GLUCOSE SERPL-MCNC: 126 MG/DL (ref 65–99)
HCT VFR BLD AUTO: 43.4 % (ref 37–47)
HGB BLD-MCNC: 14.9 G/DL (ref 12–16)
IMM GRANULOCYTES # BLD AUTO: 0.03 K/UL (ref 0–0.11)
IMM GRANULOCYTES NFR BLD AUTO: 0.3 % (ref 0–0.9)
LYMPHOCYTES # BLD AUTO: 2.43 K/UL (ref 1–4.8)
LYMPHOCYTES NFR BLD: 26.8 % (ref 22–41)
MCH RBC QN AUTO: 31.7 PG (ref 27–33)
MCHC RBC AUTO-ENTMCNC: 34.3 G/DL (ref 33.6–35)
MCV RBC AUTO: 92.3 FL (ref 81.4–97.8)
MONOCYTES # BLD AUTO: 0.42 K/UL (ref 0–0.85)
MONOCYTES NFR BLD AUTO: 4.6 % (ref 0–13.4)
NEUTROPHILS # BLD AUTO: 5.91 K/UL (ref 2–7.15)
NEUTROPHILS NFR BLD: 65.3 % (ref 44–72)
NRBC # BLD AUTO: 0 K/UL
NRBC BLD-RTO: 0 /100 WBC
PLATELET # BLD AUTO: 343 K/UL (ref 164–446)
PMV BLD AUTO: 9.9 FL (ref 9–12.9)
POTASSIUM SERPL-SCNC: 3.8 MMOL/L (ref 3.6–5.5)
RBC # BLD AUTO: 4.7 M/UL (ref 4.2–5.4)
SODIUM SERPL-SCNC: 137 MMOL/L (ref 135–145)
WBC # BLD AUTO: 9.1 K/UL (ref 4.8–10.8)

## 2023-01-13 PROCEDURE — 80048 BASIC METABOLIC PNL TOTAL CA: CPT

## 2023-01-13 PROCEDURE — 85025 COMPLETE CBC W/AUTO DIFF WBC: CPT

## 2023-01-13 PROCEDURE — 36415 COLL VENOUS BLD VENIPUNCTURE: CPT

## 2023-01-13 PROCEDURE — 86140 C-REACTIVE PROTEIN: CPT

## 2023-01-17 ENCOUNTER — TELEMEDICINE (OUTPATIENT)
Dept: MEDICAL GROUP | Facility: IMAGING CENTER | Age: 38
End: 2023-01-17
Payer: COMMERCIAL

## 2023-01-17 VITALS — WEIGHT: 200 LBS | BODY MASS INDEX: 34.15 KG/M2 | HEIGHT: 64 IN

## 2023-01-17 DIAGNOSIS — E28.2 PCOS (POLYCYSTIC OVARIAN SYNDROME): ICD-10-CM

## 2023-01-17 DIAGNOSIS — G43.119 INTRACTABLE MIGRAINE WITH AURA WITHOUT STATUS MIGRAINOSUS: ICD-10-CM

## 2023-01-17 PROBLEM — R05.1 ACUTE COUGH: Status: RESOLVED | Noted: 2022-12-20 | Resolved: 2023-01-17

## 2023-01-17 PROCEDURE — 99214 OFFICE O/P EST MOD 30 MIN: CPT | Mod: 95 | Performed by: PHYSICIAN ASSISTANT

## 2023-01-17 ASSESSMENT — PATIENT HEALTH QUESTIONNAIRE - PHQ9: CLINICAL INTERPRETATION OF PHQ2 SCORE: 0

## 2023-01-17 ASSESSMENT — FIBROSIS 4 INDEX: FIB4 SCORE: 0.4

## 2023-01-17 NOTE — PATIENT INSTRUCTIONS
Neurology [facility] NEUROLOGY  [address], [address]  [place] [address]  Phone: [phone]  Fax: [phone]

## 2023-01-17 NOTE — ASSESSMENT & PLAN NOTE
Has only had one migraine since being started on Topamax.  Wishes to continue this medication.  No side effects.  Has not made an appointment yet with neurology to establish care.

## 2023-01-17 NOTE — ASSESSMENT & PLAN NOTE
Has noticed less pelvic cramping and improvements with her skin since being on Spironolactone. No side effects. No high potassium. Hasn't started Lo-Loestrin yet. Does not need OCP for birth control.

## 2023-01-17 NOTE — PROGRESS NOTES
"Virtual Visit: Established Patient   This visit was conducted via Zoom using secure and encrypted videoconferencing technology. The patient was in a private location in the state of [place].    The patient's identity was confirmed and verbal consent was obtained for this virtual visit.    Subjective:   CC:   Chief Complaint   Patient presents with    Follow-Up     PCOS and migraines       Magaly Lynch is a 37 y.o. female presenting for evaluation and management of:    Intractable migraine with aura without status migrainosus  Has only had one migraine since being started on Topamax.  Wishes to continue this medication.  No side effects.  Has not made an appointment yet with neurology to establish care.    PCOS (polycystic ovarian syndrome)  Has noticed less pelvic cramping and improvements with her skin since being on Spironolactone. No side effects. No high potassium. Hasn't started Lo-Loestrin yet. Does not need OCP for birth control.    Depression Screening    Little interest or pleasure in doing things?  0 - not at all  Feeling down, depressed , or hopeless? 0 - not at all  Patient Health Questionnaire Score: 0      ROS   Denies any recent fevers or chills. No nausea or vomiting. No chest pains or shortness of breath.     Allergies   Allergen Reactions    Hydrocortisone Sod Succinate      Patient notes that it \"made her pass out\". Use solu-medrol for infusion premedication  Patient notes that it \"made her pass out\". Use solu-medrol for infusion premedication    Sumatriptan Shortness of Breath    Infliximab      15min into infusion at 10cc/hr, pt reported chest tightness/ ticklish feeling and cough. Infusion stopped. O2 supplement/Benadryl /albuterol administered. Monitored for about 2hrs and discharged in stable condition. VSS throughout the treatment.     Cedarwood Oil     Grass Extracts [Gramineae Pollens]     Lidocaine      Nasal spray causes fainting (dental shots were okay)    Poison Oak Extract " [Extract Of Poison Oak]     Pollen Extract     Prochlorperazine      distonic    Promethazine      distonic    Zolmitriptan      sleepy  sleepy    Azathioprine Itching and Rash       Current medicines (including changes today)  Current Outpatient Medications   Medication Sig Dispense Refill    spironolactone (ALDACTONE) 50 MG Tab TAKE 1 TABLET BY MOUTH EVERY DAY 90 Tablet 0    albuterol 108 (90 Base) MCG/ACT Aero Soln inhalation aerosol Inhale 2 Puffs every four hours as needed for Shortness of Breath. 1 Each 0    topiramate (TOPAMAX) 25 MG capsule TAKE 1 CAPSULE BY MOUTH TWICE A  Capsule 0    vitamin D3 (CHOLECALCIFEROL) 1000 Unit (25 mcg) Tab Take 2,000 Units by mouth every day.      Acetaminophen (TYLENOL PO) Take  by mouth.      IBUPROFEN PO Take  by mouth.      inFLIXimab (REMICADE) 100 MG Recon Soln Infuse  into a venous catheter.      Adapalene 0.3 % Gel       Omega-3 Fatty Acids (FISH OIL) 1000 MG Cap capsule Take 1,000 mg by mouth every day.      therapeutic multivitamin-minerals (THERAGRAN-M) Tab Take 1 Tab by mouth every day.      Probiotic Product (PROBIOTIC-10 PO) Take  by mouth.       No current facility-administered medications for this visit.       Patient Active Problem List    Diagnosis Date Noted    PCOS (polycystic ovarian syndrome) 10/13/2022    History of ovarian cyst 10/03/2022    Obesity (BMI 30-39.9) 10/03/2022    Irregular menses 10/03/2022    Crohn's disease of small intestine without complication (HCC) 10/06/2020    Fibroid 01/28/2020    Nephrolithiasis 01/14/2020    Intractable migraine with aura without status migrainosus 06/17/2019       Family History   Problem Relation Age of Onset    Breast Cancer Mother 57        BRCA negative    Other Mother         MS, fibroids/hysterectomy    GI Disease Maternal Aunt         Crohn's    Cancer Maternal Grandmother         uterine or fibrioids?, breast, skin, lung    Lung Disease Maternal Grandmother         emphysema    Psychiatric Illness  "Maternal Grandmother         suicide    GI Disease Maternal Grandfather         Crohn's       She  has a past medical history of Cancer (HCC), Crohn's disease (HCC) (20111), Hip pain, bilateral, Hypertension, IBD (inflammatory bowel disease), Kidney stone (2008 to 2015), Migraines (1998), MRSA infection, Ovarian cyst, and Recurrent sinusitis (1/14/2020).    She has no past medical history of Addisons disease (HCC), Adrenal disorder (HCC), Allergy, Anemia, Anxiety, Arrhythmia, Arthritis, Asthma, Blood transfusion without reported diagnosis, Cataract, CHF (congestive heart failure) ([facility]), Clotting disorder (HCC), COPD (chronic obstructive pulmonary disease) (HCC), Cushings syndrome (HCC), Depression, Diabetes (HCC), Diabetic neuropathy (HCC), GERD (gastroesophageal reflux disease), Glaucoma, Goiter, Head ache, Heart attack (HCC), Heart murmur, HIV (human immunodeficiency virus infection) ([facility]), Hyperlipidemia, Muscle disorder, Osteoporosis, Parathyroid disorder (HCC), Pituitary disease (HCC), Pulmonary emphysema (HCC), Seizure (HCC), Sickle cell disease (HCC), Stroke (HCC), Substance abuse (HCC), Thyroid disease, Tuberculosis, or Urinary tract infection.  She  has a past surgical history that includes dental extraction(s) and lumbar transforaminal epidural steroid injection (Right, 5/17/2021).         Objective:   Ht 1.626 m (5' 4\")   Wt 90.7 kg (200 lb)   BMI 34.33 kg/m²   RR 12    Physical Exam:  Constitutional: Alert, no distress, well-groomed.  Skin: No rashes in visible areas.  Eye: Round. Conjunctiva clear, lids normal. No icterus.   ENMT: Lips pink without lesions, good dentition, moist mucous membranes. Phonation normal.  Neck: No masses, no thyromegaly. Moves freely without pain.  Respiratory: Unlabored respiratory effort, no cough or audible wheeze  Psych: Alert and oriented x3, normal affect and mood.     Assessment and Plan:   The following treatment plan was discussed:     1. PCOS (polycystic ovarian " syndrome)  Chronic, controlled and stable. Continue current regimen.  Recheck labs in 4 to 6 months due to spironolactone use.  Continue to hold birth control.  - Basic Metabolic Panel; Future    2. Intractable migraine with aura without status migrainosus  Chronic, controlled and stable. Continue current regimen.  No current symptoms.  Continue Topamax.  Schedule appointment with neurology to establish care.      Follow-up: Return in about 6 months (around 7/17/2023) for Annual physical, Follow-up labs/tests.         Lena Randhawa PA-C ([place])  Physician Assistant Certified  [facility]    Please note that this dictation was created using voice recognition software. I have made every reasonable attempt to correct obvious errors, but I expect that there are errors of grammar and possibly content that I did not discover before finalizing the note.

## 2023-01-18 ENCOUNTER — TELEMEDICINE (OUTPATIENT)
Dept: MEDICAL GROUP | Facility: IMAGING CENTER | Age: 38
End: 2023-01-18
Payer: COMMERCIAL

## 2023-01-18 VITALS — BODY MASS INDEX: 34.33 KG/M2 | HEIGHT: 64 IN

## 2023-01-18 DIAGNOSIS — U07.1 COVID-19: ICD-10-CM

## 2023-01-18 PROCEDURE — 99214 OFFICE O/P EST MOD 30 MIN: CPT | Mod: 95 | Performed by: CLINICAL NURSE SPECIALIST

## 2023-01-18 RX ORDER — BENZONATATE 100 MG/1
100 CAPSULE ORAL NIGHTLY PRN
Qty: 30 CAPSULE | Refills: 0 | Status: SHIPPED | OUTPATIENT
Start: 2023-01-18 | End: 2024-02-13

## 2023-01-18 ASSESSMENT — PAIN SCALES - GENERAL: PAINLEVEL: NO PAIN

## 2023-01-18 NOTE — PROGRESS NOTES
"Telemedicine: Established Patient   This evaluation was conducted via Zoom using secure and encrypted videoconferencing technology. The patient was in their home in the [facility].    The patient's identity was confirmed and verbal consent was obtained for this virtual visit.    Subjective:   CC:   Chief Complaint   Patient presents with    Cough     x3days    Nasal Congestion    Body Aches    Other     Tested positive for covid this am        HPI:  Symptoms started 2 days ago. Home test for COVID-19 positive today.  Currently she is experiencing cough, congestion, chills, heat, heavy lungs.  Mild shortness of breath.  Loss of appetite.  Mild headache. No fevers since starting Remicade.  Had bronchitis one month ago and felt like never fully recovered.     ROS  No rash, increased n/v/d, change in taste or smell, sore throat.     Allergies   Allergen Reactions    Hydrocortisone Sod Succinate      Patient notes that it \"made her pass out\". Use solu-medrol for infusion premedication  Patient notes that it \"made her pass out\". Use solu-medrol for infusion premedication    Sumatriptan Shortness of Breath    Infliximab      15min into infusion at 10cc/hr, pt reported chest tightness/ ticklish feeling and cough. Infusion stopped. O2 supplement/Benadryl /albuterol administered. Monitored for about 2hrs and discharged in stable condition. VSS throughout the treatment.     Cedarwood Oil     Grass Extracts [Gramineae Pollens]     Lidocaine      Nasal spray causes fainting (dental shots were okay)    Poison Stollings Extract [Extract Of Poison Stollings]     Pollen Extract     Prochlorperazine      distonic    Promethazine      distonic    Zolmitriptan      sleepy  sleepy    Azathioprine Itching and Rash       Current medicines (including changes today)  Current Outpatient Medications   Medication Sig Dispense Refill    benzonatate (TESSALON) 100 MG Cap Take 1 Capsule by mouth at bedtime as needed for Cough. 30 Capsule 0    " Nirmatrelvir&Ritonavir 300/100 20 x 150 MG & 10 x 100MG Tablet Therapy Pack Take 300 mg nirmatrelvir (two 150 mg tablets) with 100 mg ritonavir (one 100 mg tablet) by mouth, with all three tablets taken together twice daily for 5 days. 30 Each 0    spironolactone (ALDACTONE) 50 MG Tab TAKE 1 TABLET BY MOUTH EVERY DAY 90 Tablet 0    albuterol 108 (90 Base) MCG/ACT Aero Soln inhalation aerosol Inhale 2 Puffs every four hours as needed for Shortness of Breath. 1 Each 0    topiramate (TOPAMAX) 25 MG capsule TAKE 1 CAPSULE BY MOUTH TWICE A  Capsule 0    vitamin D3 (CHOLECALCIFEROL) 1000 Unit (25 mcg) Tab Take 2,000 Units by mouth every day.      Acetaminophen (TYLENOL PO) Take  by mouth.      IBUPROFEN PO Take  by mouth.      inFLIXimab (REMICADE) 100 MG Recon Soln Infuse  into a venous catheter.      Adapalene 0.3 % Gel       Omega-3 Fatty Acids (FISH OIL) 1000 MG Cap capsule Take 1,000 mg by mouth every day.      therapeutic multivitamin-minerals (THERAGRAN-M) Tab Take 1 Tab by mouth every day.      Probiotic Product (PROBIOTIC-10 PO) Take  by mouth.       No current facility-administered medications for this visit.       Patient Active Problem List    Diagnosis Date Noted    PCOS (polycystic ovarian syndrome) 10/13/2022    History of ovarian cyst 10/03/2022    Obesity (BMI 30-39.9) 10/03/2022    Irregular menses 10/03/2022    Crohn's disease of small intestine without complication (HCC) 10/06/2020    Fibroid 01/28/2020    Nephrolithiasis 01/14/2020    Intractable migraine with aura without status migrainosus 06/17/2019         Family History   Problem Relation Age of Onset    Breast Cancer Mother 57        BRCA negative    Other Mother         MS, fibroids/hysterectomy    GI Disease Maternal Aunt         Crohn's    Cancer Maternal Grandmother         uterine or fibrioids?, breast, skin, lung    Lung Disease Maternal Grandmother         emphysema    Psychiatric Illness Maternal Grandmother         suicide    GI  Disease Maternal Grandfather         Crohn's       She  has a past medical history of Cancer (HCC), Crohn's disease (HCC) (20111), Hip pain, bilateral, Hypertension, IBD (inflammatory bowel disease), Kidney stone (2008 to 2015), Migraines (1998), MRSA infection, Ovarian cyst, and Recurrent sinusitis (1/14/2020).    She has no past medical history of Addisons disease (HCC), Adrenal disorder (HCC), Allergy, Anemia, Anxiety, Arrhythmia, Arthritis, Asthma, Blood transfusion without reported diagnosis, Cataract, CHF (congestive heart failure) ([facility]), Clotting disorder (HCC), COPD (chronic obstructive pulmonary disease) (HCC), Cushings syndrome (HCC), Depression, Diabetes (HCC), Diabetic neuropathy (HCC), GERD (gastroesophageal reflux disease), Glaucoma, Goiter, Head ache, Heart attack (HCC), Heart murmur, HIV (human immunodeficiency virus infection) ([facility]), Hyperlipidemia, Muscle disorder, Osteoporosis, Parathyroid disorder (HCC), Pituitary disease (HCC), Pulmonary emphysema (HCC), Seizure ([facility]), Sickle cell disease (HCC), Stroke ([facility]), Substance abuse (HCC), Thyroid disease, Tuberculosis, or Urinary tract infection.  She  has a past surgical history that includes dental extraction(s) and lumbar transforaminal epidural steroid injection (Right, 5/17/2021).       Objective:     Physical Exam     Vitals obtained by patient:  There were no vitals filed for this visit.     Constitutional: Alert, no distress, well-groomed.  Skin: No rashes in visible areas.  Eye: Round. Conjunctiva clear, lids normal. No icterus.   ENMT: Lips pink without lesions, good dentition, moist mucous membranes. Phonation normal.  Neck: No masses, no thyromegaly. Moves freely without pain.  CV: Pulse not reported by patient  Respiratory: Unlabored respiratory effort, no cough or audible wheeze  Psych: Alert and oriented x4, normal affect and mood.     Assessment and Plan:   The following treatment plan was discussed:     1. COVID-19  For nasal  congestion, take hot, steamy showers and use saline nasal spray or a neti pot as needed to clear congestion.      To soothe the throat, drink warm, herbal tea such as Throat Coat or ginger with honey and lemon.     For pain, take ibuprofen or Tylenol as needed according to package instructions. Do not take more than 3000mg Tylenol or 1200mg ibuprofen in 24 hours.      For cough, take Mucinex as needed during the day and use throat lozenges such as Ricola.  Use dextromethorphan for cough only at night to allow the body to expel the pathogen during the day.  Use Tessalon Perles as needed at night for cough suppression.     For shortness of breath, use albuterol rescue inhaler.    Hydrate well.  Take 5-10 deep breaths intermittently throughout the day and move the body as tolerated to aid in respiration.    Patient was informed that Paxlovid is under emergency authorization only.  Side effects were reviewed including hypertension, headache, bitter taste, diarrhea, renal impairment, Amaral-Ravi syndrome, toxic epidermal necrolysis, hepatitis, hypersensitivity reaction and angioedema.  Mary would like to move forward with the medication.      - benzonatate (TESSALON) 100 MG Cap; Take 1 Capsule by mouth at bedtime as needed for Cough.  Dispense: 30 Capsule; Refill: 0  - Nirmatrelvir&Ritonavir 300/100 20 x 150 MG & 10 x 100MG Tablet Therapy Pack; Take 300 mg nirmatrelvir (two 150 mg tablets) with 100 mg ritonavir (one 100 mg tablet) by mouth, with all three tablets taken together twice daily for 5 days.  Dispense: 30 Each; Refill: 0      Follow-up: Return if symptoms worsen or fail to improve.

## 2023-03-01 DIAGNOSIS — G43.119 INTRACTABLE MIGRAINE WITH AURA WITHOUT STATUS MIGRAINOSUS: ICD-10-CM

## 2023-03-02 RX ORDER — TOPIRAMATE SPINKLE 25 MG/1
CAPSULE ORAL
Qty: 180 CAPSULE | Refills: 0 | Status: SHIPPED | OUTPATIENT
Start: 2023-03-02 | End: 2023-06-02

## 2023-03-07 ENCOUNTER — OFFICE VISIT (OUTPATIENT)
Dept: MEDICAL GROUP | Facility: IMAGING CENTER | Age: 38
End: 2023-03-07
Payer: COMMERCIAL

## 2023-03-07 VITALS
WEIGHT: 200 LBS | TEMPERATURE: 98.2 F | BODY MASS INDEX: 34.15 KG/M2 | HEART RATE: 84 BPM | SYSTOLIC BLOOD PRESSURE: 108 MMHG | HEIGHT: 64 IN | DIASTOLIC BLOOD PRESSURE: 78 MMHG | OXYGEN SATURATION: 96 %

## 2023-03-07 DIAGNOSIS — R05.1 ACUTE COUGH: ICD-10-CM

## 2023-03-07 DIAGNOSIS — J40 BRONCHITIS: ICD-10-CM

## 2023-03-07 DIAGNOSIS — J02.9 SORE THROAT: ICD-10-CM

## 2023-03-07 LAB
FLUAV+FLUBV AG SPEC QL IA: NEGATIVE
INT CON NEG: NORMAL
INT CON NEG: NORMAL
INT CON POS: NORMAL
INT CON POS: NORMAL
S PYO AG THROAT QL: NEGATIVE

## 2023-03-07 PROCEDURE — 87804 INFLUENZA ASSAY W/OPTIC: CPT | Performed by: PHYSICIAN ASSISTANT

## 2023-03-07 PROCEDURE — 99214 OFFICE O/P EST MOD 30 MIN: CPT | Mod: 25 | Performed by: PHYSICIAN ASSISTANT

## 2023-03-07 PROCEDURE — 94640 AIRWAY INHALATION TREATMENT: CPT | Performed by: PHYSICIAN ASSISTANT

## 2023-03-07 PROCEDURE — 87880 STREP A ASSAY W/OPTIC: CPT | Performed by: PHYSICIAN ASSISTANT

## 2023-03-07 RX ORDER — DOXYCYCLINE HYCLATE 100 MG
100 TABLET ORAL 2 TIMES DAILY
Qty: 20 TABLET | Refills: 0 | Status: SHIPPED | OUTPATIENT
Start: 2023-03-07 | End: 2023-03-17

## 2023-03-07 RX ORDER — METHYLPREDNISOLONE 4 MG/1
TABLET ORAL
Qty: 21 TABLET | Refills: 0 | Status: SHIPPED | OUTPATIENT
Start: 2023-03-07 | End: 2023-03-30

## 2023-03-07 RX ORDER — DEXAMETHASONE SODIUM PHOSPHATE 4 MG/ML
4 INJECTION, SOLUTION INTRA-ARTICULAR; INTRALESIONAL; INTRAMUSCULAR; INTRAVENOUS; SOFT TISSUE ONCE
Status: COMPLETED | OUTPATIENT
Start: 2023-03-07 | End: 2023-03-07

## 2023-03-07 RX ORDER — ALBUTEROL SULFATE 2.5 MG/3ML
2.5 SOLUTION RESPIRATORY (INHALATION) ONCE
Status: COMPLETED | OUTPATIENT
Start: 2023-03-07 | End: 2023-03-07

## 2023-03-07 RX ADMIN — DEXAMETHASONE SODIUM PHOSPHATE 4 MG: 4 INJECTION, SOLUTION INTRA-ARTICULAR; INTRALESIONAL; INTRAMUSCULAR; INTRAVENOUS; SOFT TISSUE at 13:41

## 2023-03-07 RX ADMIN — ALBUTEROL SULFATE 2.5 MG: 2.5 SOLUTION RESPIRATORY (INHALATION) at 13:37

## 2023-03-07 ASSESSMENT — FIBROSIS 4 INDEX: FIB4 SCORE: 0.4

## 2023-03-07 NOTE — PROGRESS NOTES
Subjective:     CC:   Chief Complaint   Patient presents with    Cough     Started over 1 week ago, cough, sore throat, right ear ache, runny nose, congestion. Negative home covid tests. Taken OTC without much alleviation. Inhaler mildly helping cough.       HPI:   Magaly presents today to discuss:    Acute cough  Pt c/o cough, chest congestion, sore throat, and ear pain x 1 week. Admits to coughing fits throughout the day and night. Has had chills, but no fever. No improvement with Mucinex.    Past Medical History:   Diagnosis Date    Cancer (HCC)     Crohn's disease (HCC) 20111    Hip pain, bilateral     Hypertension     IBD (inflammatory bowel disease)     Kidney stone 2008 to 2015    4x (mostly on right side).     Migraines 1998    MRSA infection     sinus infection    Ovarian cyst     age 18, burst    Recurrent sinusitis 1/14/2020    -Patient prefers antibiotic treatment at this time.  We have had an antibiotic stewardship conversation.  Clindamycin is the next step given that she failed Augmentin and has confirmed MRSA on nasal swab.  However she has Crohn's disease and not comfortable prescribing this due to increased risk of C. difficile infection.  We will now try Bactrim and doxycycline for 10 days. -Patient aware of most     Family History   Problem Relation Age of Onset    Breast Cancer Mother 57        BRCA negative    Other Mother         MS, fibroids/hysterectomy    GI Disease Maternal Aunt         Crohn's    Cancer Maternal Grandmother         uterine or fibrioids?, breast, skin, lung    Lung Disease Maternal Grandmother         emphysema    Psychiatric Illness Maternal Grandmother         suicide    GI Disease Maternal Grandfather         Crohn's     Past Surgical History:   Procedure Laterality Date    LUMBAR TRANSFORAMINAL EPIDURAL STEROID INJECTION Right 5/17/2021    Procedure: RIGHT L4-5 transforaminal epidural steroid injection with sedation;  Surgeon: Godfrey Vigil M.D.;  [facility];  Service: Pain Management    DENTAL EXTRACTION(S)       Social History     Tobacco Use    Smoking status: Never    Smokeless tobacco: Never   Vaping Use    Vaping Use: Never used   Substance Use Topics    Alcohol use: Yes     Comment: once a week    Drug use: Yes     Frequency: 1.0 times per week     Types: Marijuana     Comment: past hallucinogens, smoking (vape)     Social History     Social History Narrative    Not on file     Current Outpatient Medications Ordered in Epic   Medication Sig Dispense Refill    doxycycline (VIBRAMYCIN) 100 MG Tab Take 1 Tablet by mouth 2 times a day for 10 days. 20 Tablet 0    methylPREDNISolone (MEDROL DOSEPAK) 4 MG Tablet Therapy Pack As directed on the packaging label. 21 Tablet 0    topiramate (TOPAMAX) 25 MG capsule TAKE 1 CAPSULE BY MOUTH TWICE A  Capsule 0    benzonatate (TESSALON) 100 MG Cap Take 1 Capsule by mouth at bedtime as needed for Cough. 30 Capsule 0    spironolactone (ALDACTONE) 50 MG Tab TAKE 1 TABLET BY MOUTH EVERY DAY 90 Tablet 0    albuterol 108 (90 Base) MCG/ACT Aero Soln inhalation aerosol Inhale 2 Puffs every four hours as needed for Shortness of Breath. 1 Each 0    vitamin D3 (CHOLECALCIFEROL) 1000 Unit (25 mcg) Tab Take 2,000 Units by mouth every day.      Acetaminophen (TYLENOL PO) Take  by mouth.      IBUPROFEN PO Take  by mouth.      inFLIXimab (REMICADE) 100 MG Recon Soln Infuse  into a venous catheter.      Adapalene 0.3 % Gel       Omega-3 Fatty Acids (FISH OIL) 1000 MG Cap capsule Take 1,000 mg by mouth every day.      therapeutic multivitamin-minerals (THERAGRAN-M) Tab Take 1 Tab by mouth every day.      Probiotic Product (PROBIOTIC-10 PO) Take  by mouth.       No current Epic-ordered facility-administered medications on file.     Hydrocortisone sod succinate, Sumatriptan, Infliximab, Cedarwood oil, Grass extracts [gramineae pollens], Lidocaine, Poison oak extract [extract of poison oak], Pollen extract,  "Prochlorperazine, Promethazine, Zolmitriptan, and Azathioprine    PMH/PSH/FH/Social history reviewed.  Vaccinations discussed.  Previous records and labs reviewed. Discussed age appropriate anticipatory guidance.    ROS: see hpi  Gen: no fevers  CV: no chest pain, no palpitations, no edema  GI: no nausea/vomiting, no diarrhea  Skin: no rash    Objective:   Exam:  /78 (BP Location: Left arm, Patient Position: Sitting, BP Cuff Size: Adult)   Pulse 84   Temp 36.8 °C (98.2 °F) (Temporal)   Ht 1.626 m (5' 4\")   Wt 90.7 kg (200 lb)   SpO2 96%   BMI 34.33 kg/m²    Body mass index is 34.33 kg/m².    Gen: Alert and oriented, No apparent distress.  HEENT: Head atraumatic, normocephalic. Pupils equal and round. B/L Tms pearly gray. B/L nasal turbinate hypertrophy and erythema. +posterior pharynx erythema.   Neck: Neck is supple without lymphadenopathy.   Lungs: Normal effort, substernal wheeze.  CV: Regular rate and rhythm. No murmurs, rubs, or gallops.  Ext: No clubbing, cyanosis, edema.    Assessment & Plan:     37 y.o. female with the following -     1. Bronchitis  My recommendations for an upper respiratory infection:  - Use a humidifier, especially at night. Cold or warm water humidifiers have the same effect.  - Nicola Med squeeze bottle sinus rinses or plain nasal saline twice a day.  - Hot tea + honey + fresh lemon juice  - Honey by itself has been shown to help provide cough relief  - Frequent hand washing, rest, hydration  - OTC meds as recommended today during your visit  - Albuterol 1 to 2 puffs every 4-6 hours  - doxycycline (VIBRAMYCIN) 100 MG Tab; Take 1 Tablet by mouth 2 times a day for 10 days.  Dispense: 20 Tablet; Refill: 0  - methylPREDNISolone (MEDROL DOSEPAK) 4 MG Tablet Therapy Pack; As directed on the packaging label.  Dispense: 21 Tablet; Refill: 0    2. Sore throat  - POCT Rapid Strep A    3. Acute cough  - POCT Influenza A/B  - albuterol (PROVENTIL) 2.5mg/3ml nebulizer solution 2.5 mg  - " dexamethasone (DECADRON) injection 4 mg  - dexamethasone (DECADRON) injection 4 mg    Return if symptoms worsen or fail to improve.    Lena Randhawa PA-C ([facility])  Physician Assistant Certified  [facility]    Please note that this dictation was created using voice recognition software. I have made every reasonable attempt to correct obvious errors, but I expect that there are errors of grammar and possibly content that I did not discover before finalizing the note.

## 2023-03-07 NOTE — PATIENT INSTRUCTIONS
It was a pleasure meeting with you today at [facility]!    Your medical history/records and medications were reviewed today.     UPDATE on MyChart Results: If you have blood work, and/or imaging studies, or any other test or procedure completed, you will have access to results as soon as they become available in MyChart. Recently, these results will be available for review at the same time that your provider is able to see results!    This will likely mean you will see a result before your provider has had a chance to review and discuss with you.  Some results or care notes may be hard to understand and may be serious in nature.    We look at every result and your provider will contact you to explain what they mean and discuss appropriate next steps. Please allow for at least 72 business hours for chart and result review.     We prefer that you wait for your care team to contact you with your results.  Often, your provider will discuss your results with you at your next appointment. We look forward to continuing to partner with you in your care.    Please review my practice information below:    If you have any prescription refill requests, please send them via Mygeni or discuss with your provider at the start of your office visits. Please allow 3-5 business days for lab and testing review and you will be contacted via Mygeni with those results, or if advised to make a follow up appointment regarding those results, then please do so.     Once resulted, your lab/test/imaging results will show up automatically in your MyChart. Please wait for my interpretation and recommendations prior to viewing your results to avoid any unnecessary confusion or misinterpretation. I will address all of the lab values that I interpret as abnormal and message you accordingly on your MyChart. I will always send you a message about your results even if they are normal. If you do not hear back from me within 5-7 business  days after completing your tests, then please send me a message on Melty so I can obtain your results (especially if you went to an outside lab or imaging center - [facility], [facility], etc).     If you have any additional questions or concerns beyond my interpretation of your results, please make an appointment with me to discuss in further detail.    Please only use the Melty messaging system for questions regarding your most recent appointment or if advised to use otherwise (glucose or blood pressure reporting).     If you have any new problems or concerns, you must make an appointment to discuss. This includes any referral requests, lab requests (unless advised to notify me for pre-appt labs), medication side effects, or request for medication adjustments.     Please arrive 15 minutes prior to your appointment time to complete your check-in and intake with the medical assistant.      Thank you,    Lena Randhawa PA-C ([place])  Physician Assistant Certified  [facility]    -----------------------------------------------------------------    Attn: Patients of [facility]:    In an effort to continue to provide excellent and efficient care to our patients, it is vital that we continue to use our resources appropriately. With that, this is a reminder that Melty is used for prescription refill requests, test results, virtual visits, and chart review only.     Any new questions, concerns/conditions, lab/imaging requests, medication adjustments, new prescriptions, or referral requests do require an appointment (virtually or in person), unless discussed otherwise at your most recent appointment.     Thank you for your understanding,    [facility]

## 2023-03-07 NOTE — ASSESSMENT & PLAN NOTE
Pt c/o cough, chest congestion, sore throat, and ear pain x 1 week. Admits to coughing fits throughout the day and night. Has had chills, but no fever. No improvement with Mucinex.

## 2023-03-27 DIAGNOSIS — J40 BRONCHITIS: ICD-10-CM

## 2023-03-29 ENCOUNTER — HOSPITAL ENCOUNTER (OUTPATIENT)
Dept: RADIOLOGY | Facility: MEDICAL CENTER | Age: 38
End: 2023-03-29
Attending: PHYSICIAN ASSISTANT
Payer: COMMERCIAL

## 2023-03-29 DIAGNOSIS — J40 BRONCHITIS: ICD-10-CM

## 2023-03-29 PROCEDURE — 71046 X-RAY EXAM CHEST 2 VIEWS: CPT

## 2023-03-30 ENCOUNTER — OFFICE VISIT (OUTPATIENT)
Dept: MEDICAL GROUP | Facility: IMAGING CENTER | Age: 38
End: 2023-03-30
Payer: COMMERCIAL

## 2023-03-30 VITALS
HEART RATE: 103 BPM | HEIGHT: 64 IN | TEMPERATURE: 97.5 F | WEIGHT: 197.4 LBS | SYSTOLIC BLOOD PRESSURE: 104 MMHG | BODY MASS INDEX: 33.7 KG/M2 | DIASTOLIC BLOOD PRESSURE: 72 MMHG | OXYGEN SATURATION: 95 %

## 2023-03-30 DIAGNOSIS — J40 BRONCHITIS: ICD-10-CM

## 2023-03-30 DIAGNOSIS — J98.01 BRONCHOSPASM: ICD-10-CM

## 2023-03-30 DIAGNOSIS — J06.9 RECURRENT URI (UPPER RESPIRATORY INFECTION): ICD-10-CM

## 2023-03-30 LAB
EXTERNAL QUALITY CONTROL: NORMAL
FLUAV+FLUBV AG SPEC QL IA: NEGATIVE
INT CON NEG: NORMAL
INT CON NEG: NORMAL
INT CON POS: NORMAL
INT CON POS: NORMAL
SARS-COV+SARS-COV-2 AG RESP QL IA.RAPID: NEGATIVE

## 2023-03-30 PROCEDURE — 99214 OFFICE O/P EST MOD 30 MIN: CPT | Mod: 25 | Performed by: PHYSICIAN ASSISTANT

## 2023-03-30 PROCEDURE — 94640 AIRWAY INHALATION TREATMENT: CPT | Performed by: PHYSICIAN ASSISTANT

## 2023-03-30 PROCEDURE — 87426 SARSCOV CORONAVIRUS AG IA: CPT | Performed by: PHYSICIAN ASSISTANT

## 2023-03-30 PROCEDURE — 87804 INFLUENZA ASSAY W/OPTIC: CPT | Performed by: PHYSICIAN ASSISTANT

## 2023-03-30 RX ORDER — PREDNISONE 20 MG/1
20 TABLET ORAL 2 TIMES DAILY
Qty: 10 TABLET | Refills: 0 | Status: SHIPPED | OUTPATIENT
Start: 2023-03-30 | End: 2023-04-04

## 2023-03-30 RX ORDER — ALBUTEROL SULFATE 2.5 MG/3ML
2.5 SOLUTION RESPIRATORY (INHALATION) ONCE
Status: COMPLETED | OUTPATIENT
Start: 2023-03-30 | End: 2023-03-30

## 2023-03-30 RX ORDER — DEXAMETHASONE SODIUM PHOSPHATE 4 MG/ML
4 INJECTION, SOLUTION INTRA-ARTICULAR; INTRALESIONAL; INTRAMUSCULAR; INTRAVENOUS; SOFT TISSUE ONCE
Status: COMPLETED | OUTPATIENT
Start: 2023-03-30 | End: 2023-03-30

## 2023-03-30 RX ORDER — BUDESONIDE AND FORMOTEROL FUMARATE DIHYDRATE 80; 4.5 UG/1; UG/1
2 AEROSOL RESPIRATORY (INHALATION) 2 TIMES DAILY
Qty: 10.2 G | Refills: 0 | Status: SHIPPED | OUTPATIENT
Start: 2023-03-30 | End: 2023-04-03

## 2023-03-30 RX ORDER — CEFDINIR 300 MG/1
300 CAPSULE ORAL 2 TIMES DAILY
Qty: 20 CAPSULE | Refills: 0 | Status: SHIPPED | OUTPATIENT
Start: 2023-03-30 | End: 2023-04-09

## 2023-03-30 RX ORDER — IPRATROPIUM BROMIDE AND ALBUTEROL SULFATE 2.5; .5 MG/3ML; MG/3ML
3 SOLUTION RESPIRATORY (INHALATION) ONCE
Status: DISCONTINUED | OUTPATIENT
Start: 2023-03-30 | End: 2023-03-30

## 2023-03-30 RX ADMIN — DEXAMETHASONE SODIUM PHOSPHATE 4 MG: 4 INJECTION, SOLUTION INTRA-ARTICULAR; INTRALESIONAL; INTRAMUSCULAR; INTRAVENOUS; SOFT TISSUE at 09:32

## 2023-03-30 RX ADMIN — Medication 0.5 MG: at 09:33

## 2023-03-30 RX ADMIN — ALBUTEROL SULFATE 2.5 MG: 2.5 SOLUTION RESPIRATORY (INHALATION) at 09:33

## 2023-03-30 RX ADMIN — DEXAMETHASONE SODIUM PHOSPHATE 4 MG: 4 INJECTION, SOLUTION INTRA-ARTICULAR; INTRALESIONAL; INTRAMUSCULAR; INTRAVENOUS; SOFT TISSUE at 09:33

## 2023-03-30 ASSESSMENT — FIBROSIS 4 INDEX: FIB4 SCORE: 0.4

## 2023-03-30 NOTE — PROGRESS NOTES
Subjective:     CC:   Chief Complaint   Patient presents with    Cough     Cough, sore throat, ear ache, congestion. Sx resolved from last sickness and now she is feeling sick again       HPI:   Magaly presents today to discuss:    Acute cough  Pt admits to recurrent chest congestion, cough, sore throat x 4 days. Had a previous URI that resolved 2 weeks prior.  States that her symptoms are worse now than her previous infection.  Cough throughout the day and worse at night.  Mucinex and albuterol are not helping.  No fever, but has felt chills.  Has done several COVID test at home -all of which have been negative.      Past Medical History:   Diagnosis Date    Cancer (HCC)     Crohn's disease (HCC) 20111    Hip pain, bilateral     Hypertension     IBD (inflammatory bowel disease)     Kidney stone 2008 to 2015    4x (mostly on right side).     Migraines 1998    MRSA infection     sinus infection    Ovarian cyst     age 18, burst    Recurrent sinusitis 1/14/2020    -Patient prefers antibiotic treatment at this time.  We have had an antibiotic stewardship conversation.  Clindamycin is the next step given that she failed Augmentin and has confirmed MRSA on nasal swab.  However she has Crohn's disease and not comfortable prescribing this due to increased risk of C. difficile infection.  We will now try Bactrim and doxycycline for 10 days. -Patient aware of most     Family History   Problem Relation Age of Onset    Breast Cancer Mother 57        BRCA negative    Other Mother         MS, fibroids/hysterectomy    GI Disease Maternal Aunt         Crohn's    Cancer Maternal Grandmother         uterine or fibrioids?, breast, skin, lung    Lung Disease Maternal Grandmother         emphysema    Psychiatric Illness Maternal Grandmother         suicide    GI Disease Maternal Grandfather         Crohn's     Past Surgical History:   Procedure Laterality Date    LUMBAR TRANSFORAMINAL EPIDURAL STEROID INJECTION Right 5/17/2021     Procedure: RIGHT L4-5 transforaminal epidural steroid injection with sedation;  Surgeon: Godfrey Vigil M.D.;  [facility];  Service: Pain Management    DENTAL EXTRACTION(S)       Social History     Tobacco Use    Smoking status: Never    Smokeless tobacco: Never   Vaping Use    Vaping Use: Never used   Substance Use Topics    Alcohol use: Yes     Comment: once a week    Drug use: Yes     Frequency: 1.0 times per week     Types: Marijuana     Comment: past hallucinogens, smoking (vape)     Social History     Social History Narrative    Not on file     Current Outpatient Medications Ordered in Epic   Medication Sig Dispense Refill    budesonide-formoterol (SYMBICORT) 80-4.5 MCG/ACT Aerosol Inhale 2 Puffs 2 times a day. 10.2 g 0    cefdinir (OMNICEF) 300 MG Cap Take 1 Capsule by mouth 2 times a day for 10 days. 20 Capsule 0    predniSONE (DELTASONE) 20 MG Tab Take 1 Tablet by mouth 2 times a day for 5 days. 10 Tablet 0    topiramate (TOPAMAX) 25 MG capsule TAKE 1 CAPSULE BY MOUTH TWICE A  Capsule 0    benzonatate (TESSALON) 100 MG Cap Take 1 Capsule by mouth at bedtime as needed for Cough. 30 Capsule 0    spironolactone (ALDACTONE) 50 MG Tab TAKE 1 TABLET BY MOUTH EVERY DAY 90 Tablet 0    albuterol 108 (90 Base) MCG/ACT Aero Soln inhalation aerosol Inhale 2 Puffs every four hours as needed for Shortness of Breath. 1 Each 0    vitamin D3 (CHOLECALCIFEROL) 1000 Unit (25 mcg) Tab Take 2,000 Units by mouth every day.      Acetaminophen (TYLENOL PO) Take  by mouth.      IBUPROFEN PO Take  by mouth.      inFLIXimab (REMICADE) 100 MG Recon Soln Infuse  into a venous catheter.      Adapalene 0.3 % Gel       Omega-3 Fatty Acids (FISH OIL) 1000 MG Cap capsule Take 1,000 mg by mouth every day.      therapeutic multivitamin-minerals (THERAGRAN-M) Tab Take 1 Tab by mouth every day.      Probiotic Product (PROBIOTIC-10 PO) Take  by mouth.       No current Epic-ordered facility-administered  "medications on file.     Azathioprine, Hydrocortisone sod succinate, Sumatriptan, Infliximab, Lidocaine, Prochlorperazine, Promethazine, Zolmitriptan, Cedarwood oil, Grass extracts [gramineae pollens], Poison oak extract [extract of poison oak], and Pollen extract    PMH/PSH/FH/Social history reviewed.  Vaccinations discussed.  Previous records and labs reviewed. Discussed age appropriate anticipatory guidance.    ROS: see hpi  Gen: no fevers/chills  Pulm: no sob, no cough  CV: no chest pain, no palpitations, no edema  GI: no nausea/vomiting, no diarrhea  Skin: no rash    Objective:   Exam:  /72   Pulse (!) 103   Temp 36.4 °C (97.5 °F) (Temporal)   Ht 1.626 m (5' 4\")   Wt 89.5 kg (197 lb 6.4 oz)   LMP 03/20/2023 (Approximate)   SpO2 95%   BMI 33.88 kg/m²    Body mass index is 33.88 kg/m².    Gen: Alert and oriented, No apparent distress.  HEENT: Head atraumatic, normocephalic. Pupils equal and round.  Bilateral nasal turbinate hypertrophy with clear rhinorrhea.  Bilateral TMs pearly gray.  Posterior pharynx not erythematous.  Neck: Neck is supple without lymphadenopathy.   Lungs: Normal effort, CTA bilaterally, no wheezes, rhonchi, or rales.  Frequent bronchospasm, improved with DuoNeb.  CV: Regular rate and rhythm. No murmurs, rubs, or gallops.  Ext: No clubbing, cyanosis, edema.    Assessment & Plan:     37 y.o. female with the following -     1. Bronchitis  My recommendations for an upper respiratory infection:  - Use a humidifier, especially at night. Cold or warm water humidifiers have the same effect.  Warm steamy showers.  - Nicola Med squeeze bottle sinus rinses or plain nasal saline twice a day.  - Hot tea + honey + fresh lemon juice  - Honey by itself has been shown to help provide cough relief  - Frequent hand washing, rest, hydration  - OTC meds as recommended today during your visit -Vicks vapor rub, Mucinex, Flonase  - budesonide-formoterol (SYMBICORT) 80-4.5 MCG/ACT Aerosol; Inhale 2 Puffs " 2 times a day.  Dispense: 10.2 g; Refill: 0.  Rinse mouth out after use.  - cefdinir (OMNICEF) 300 MG Cap; Take 1 Capsule by mouth 2 times a day for 10 days.  Dispense: 20 Capsule; Refill: 0  - predniSONE (DELTASONE) 20 MG Tab; Take 1 Tablet by mouth 2 times a day for 5 days.  Dispense: 10 Tablet; Refill: 0.  May cause insomnia, anxiety, increased hunger, jitteriness.  - albuterol (PROVENTIL) 2.5mg/3ml nebulizer solution 2.5 mg  - ipratropium (ATROVENT) 0.02 % nebulizer solution 0.25 mg    2. Bronchospasm  - dexamethasone (DECADRON) injection 4 mg  - dexamethasone (DECADRON) injection 4 mg  - POCT Influenza A/B  - POCT SARS-COV Antigen PHANI (Symptomatic only)  - Referral to Pulmonary and Sleep Medicine    3. Recurrent URI (upper respiratory infection)  - Referral to Pulmonary and Sleep Medicine    Return if symptoms worsen or fail to improve.    My total time spent caring for the patient on the day of the encounter was 36 minutes.   This does not include time spent on separately billable procedures/tests.    Lena Randhawa PA-C ([place])  Physician Assistant Certified  [facility]    Please note that this dictation was created using voice recognition software. I have made every reasonable attempt to correct obvious errors, but I expect that there are errors of grammar and possibly content that I did not discover before finalizing the note.

## 2023-03-30 NOTE — PATIENT INSTRUCTIONS
It was a pleasure meeting with you today at [facility]!    Your medical history/records and medications were reviewed today.     UPDATE on MyChart Results: If you have blood work, and/or imaging studies, or any other test or procedure completed, you will have access to results as soon as they become available in MyChart. Recently, these results will be available for review at the same time that your provider is able to see results!    This will likely mean you will see a result before your provider has had a chance to review and discuss with you.  Some results or care notes may be hard to understand and may be serious in nature.    We look at every result and your provider will contact you to explain what they mean and discuss appropriate next steps. Please allow for at least 72 business hours for chart and result review.     We prefer that you wait for your care team to contact you with your results.  Often, your provider will discuss your results with you at your next appointment. We look forward to continuing to partner with you in your care.    Please review my practice information below:    If you have any prescription refill requests, please send them via [employer] or discuss with your provider at the start of your office visits. Please allow 3-5 business days for lab and testing review and you will be contacted via [employer] with those results, or if advised to make a follow up appointment regarding those results, then please do so.     Once resulted, your lab/test/imaging results will show up automatically in your MyChart. Please wait for my interpretation and recommendations prior to viewing your results to avoid any unnecessary confusion or misinterpretation. I will address all of the lab values that I interpret as abnormal and message you accordingly on your MyChart. I will always send you a message about your results even if they are normal. If you do not hear back from me within 5-7 business  days after completing your tests, then please send me a message on [employer] so I can obtain your results (especially if you went to an outside lab or imaging center - [facility], [facility], etc).     If you have any additional questions or concerns beyond my interpretation of your results, please make an appointment with me to discuss in further detail.    Please only use the [employer] messaging system for questions regarding your most recent appointment or if advised to use otherwise (glucose or blood pressure reporting).     If you have any new problems or concerns, you must make an appointment to discuss. This includes any referral requests, lab requests (unless advised to notify me for pre-appt labs), medication side effects, or request for medication adjustments.     Please arrive 15 minutes prior to your appointment time to complete your check-in and intake with the medical assistant.      Thank you,    Lena Randhawa PA-C ([place])  Physician Assistant Certified  [facility]    -----------------------------------------------------------------    Attn: Patients of [facility]:    In an effort to continue to provide excellent and efficient care to our patients, it is vital that we continue to use our resources appropriately. With that, this is a reminder that [employer] is used for prescription refill requests, test results, virtual visits, and chart review only.     Any new questions, concerns/conditions, lab/imaging requests, medication adjustments, new prescriptions, or referral requests do require an appointment (virtually or in person), unless discussed otherwise at your most recent appointment.     Thank you for your understanding,    [facility]

## 2023-04-03 ENCOUNTER — OFFICE VISIT (OUTPATIENT)
Dept: SLEEP MEDICINE | Facility: MEDICAL CENTER | Age: 38
End: 2023-04-03
Attending: PHYSICIAN ASSISTANT
Payer: COMMERCIAL

## 2023-04-03 VITALS
RESPIRATION RATE: 18 BRPM | SYSTOLIC BLOOD PRESSURE: 124 MMHG | OXYGEN SATURATION: 97 % | HEIGHT: 64 IN | BODY MASS INDEX: 33.29 KG/M2 | DIASTOLIC BLOOD PRESSURE: 86 MMHG | WEIGHT: 195 LBS | HEART RATE: 75 BPM

## 2023-04-03 DIAGNOSIS — K52.9 IBD (INFLAMMATORY BOWEL DISEASE): ICD-10-CM

## 2023-04-03 DIAGNOSIS — R05.3 CHRONIC COUGH: ICD-10-CM

## 2023-04-03 DIAGNOSIS — D84.9 IMMUNOSUPPRESSION (HCC): ICD-10-CM

## 2023-04-03 PROCEDURE — 99212 OFFICE O/P EST SF 10 MIN: CPT | Performed by: INTERNAL MEDICINE

## 2023-04-03 PROCEDURE — 99204 OFFICE O/P NEW MOD 45 MIN: CPT | Performed by: INTERNAL MEDICINE

## 2023-04-03 PROCEDURE — 99212 OFFICE O/P EST SF 10 MIN: CPT | Performed by: PHYSICIAN ASSISTANT

## 2023-04-03 RX ORDER — PREDNISONE 10 MG/1
TABLET ORAL
Qty: 30 TABLET | Refills: 0 | Status: SHIPPED | OUTPATIENT
Start: 2023-04-03 | End: 2023-05-15

## 2023-04-03 RX ORDER — BUDESONIDE AND FORMOTEROL FUMARATE DIHYDRATE 160; 4.5 UG/1; UG/1
2 AEROSOL RESPIRATORY (INHALATION) 2 TIMES DAILY
Qty: 1 EACH | Refills: 6 | Status: SHIPPED | OUTPATIENT
Start: 2023-04-03

## 2023-04-03 ASSESSMENT — ENCOUNTER SYMPTOMS
HEADACHES: 0
PALPITATIONS: 0
FEVER: 0
CONSTIPATION: 0
HEARTBURN: 0
BACK PAIN: 0
CLAUDICATION: 0
COUGH: 1
NAUSEA: 0
EYE PAIN: 0
FOCAL WEAKNESS: 0
DIARRHEA: 0
PND: 0
DIAPHORESIS: 0
SPEECH CHANGE: 0
DOUBLE VISION: 0
DIZZINESS: 0
SINUS PAIN: 0
WHEEZING: 0
BLURRED VISION: 0
SPUTUM PRODUCTION: 0
ORTHOPNEA: 0
VOMITING: 0
CHILLS: 0
WEAKNESS: 0
TREMORS: 0
NECK PAIN: 0
MYALGIAS: 0
PHOTOPHOBIA: 0
EYE REDNESS: 0
STRIDOR: 0
FALLS: 0
DEPRESSION: 0
WEIGHT LOSS: 0
ABDOMINAL PAIN: 0
SORE THROAT: 0
SHORTNESS OF BREATH: 0
HEMOPTYSIS: 0
EYE DISCHARGE: 0

## 2023-04-03 ASSESSMENT — FIBROSIS 4 INDEX: FIB4 SCORE: 0.4

## 2023-04-03 NOTE — PROGRESS NOTES
Chief Complaint   Patient presents with    [facility]     Referral Lena RandhawaANDRADE DX Bronchospasm  J06.9 (ICD-10-CM) - Recurrent URI (upper respiratory infection)           HPI: This patient is a 37 y.o. female presenting for evaluation of cough.  The patient's past medical history significant for inflammatory bowel disease, specifically Crohn's on Remicade therapy and a history of methotrexate but not currently on this, PCOS and migraines.  She has mild eczema but denies environmental allergies.  No history of asthma or pulmonary disease.  She is a lifelong non-smoker.  She uses edible marijuana for Crohn's related pain but does not smoke marijuana.  Family history is notable for multiple family members with Crohn's disease and a mother with multiple sclerosis.  The patient's recent respiratory history began in November when she developed sinus congestion, ear pain and nasal drainage which developed into cough.  Cough persisted for a month in December she was treated with a Medrol Dosepak and doxycycline with improvement in symptoms.  She then suffered from COVID-19 in January 23 was treated with Paxlovid but symptoms were mild and resolved quickly.  She was asymptomatic until early March when she again developed sinus congestion with right-sided ear pain, sore throat which then developed into cough.  She was treated again with Medrol Dosepak and doxycycline at the end of March however 4 days after stopping Medrol and doxycycline her cough returned this time worse.  She is currently on prednisone 20 mg twice daily for the past 3 days.  No antibiotics.  Cough is dry, severe and keeps her awake at night.  She denies associated postnasal drip stating that sinus issues are less this time around.  No gastroesophageal reflux symptoms.  No fevers.  A chest x-ray from March 29 was clear.  She was started on Symbicort last week 80 which she does feel in combination with oral steroids appears to be helping  somewhat.    Past Medical History:   Diagnosis Date    Cancer (HCC)     Crohn's disease (HCC) 20111    Hip pain, bilateral     Hypertension     IBD (inflammatory bowel disease)     Kidney stone 2008 to 2015    4x (mostly on right side).     Migraines 1998    MRSA infection     sinus infection    Ovarian cyst     age 18, burst    Recurrent sinusitis 1/14/2020    -Patient prefers antibiotic treatment at this time.  We have had an antibiotic stewardship conversation.  Clindamycin is the next step given that she failed Augmentin and has confirmed MRSA on nasal swab.  However she has Crohn's disease and not comfortable prescribing this due to increased risk of C. difficile infection.  We will now try Bactrim and doxycycline for 10 days. -Patient aware of most       Social History     Socioeconomic History    Marital status:      Spouse name: Not on file    Number of children: Not on file    Years of education: Not on file    Highest education level: Not on file   Occupational History    Occupation: pharmceutical      Comment: Prior job   Tobacco Use    Smoking status: Never     Passive exposure: Never    Smokeless tobacco: Never   Vaping Use    Vaping Use: Never used   Substance and Sexual Activity    Alcohol use: Yes     Comment: once a week    Drug use: Yes     Frequency: 1.0 times per week     Types: Marijuana, Oral     Comment: past hallucinogens, smoking (vape)    Sexual activity: Yes     Partners: Female   Other Topics Concern     Service No    Blood Transfusions No    Caffeine Concern No    Occupational Exposure No    Hobby Hazards No    Sleep Concern No    Stress Concern No    Weight Concern No    Special Diet Yes    Back Care No    Exercise Yes    Bike Helmet No    Seat Belt Yes    Self-Exams No   Social History Narrative    Not on file     Social Determinants of Health     Financial Resource Strain: Not on file   Food Insecurity: Not on file   Transportation Needs: Not on file   Physical  Activity: Not on file   Stress: Not on file   Social Connections: Not on file   Intimate Partner Violence: Not on file   Housing Stability: Not on file       Family History   Problem Relation Age of Onset    Breast Cancer Mother 57        BRCA negative    Other Mother         MS, fibroids/hysterectomy    GI Disease Maternal Aunt         Crohn's    Cancer Maternal Grandmother         uterine or fibrioids?, breast, skin, lung    Lung Disease Maternal Grandmother         emphysema    Psychiatric Illness Maternal Grandmother         suicide    GI Disease Maternal Grandfather         Crohn's       Current Outpatient Medications on File Prior to Visit   Medication Sig Dispense Refill    cefdinir (OMNICEF) 300 MG Cap Take 1 Capsule by mouth 2 times a day for 10 days. 20 Capsule 0    predniSONE (DELTASONE) 20 MG Tab Take 1 Tablet by mouth 2 times a day for 5 days. 10 Tablet 0    topiramate (TOPAMAX) 25 MG capsule TAKE 1 CAPSULE BY MOUTH TWICE A  Capsule 0    benzonatate (TESSALON) 100 MG Cap Take 1 Capsule by mouth at bedtime as needed for Cough. 30 Capsule 0    spironolactone (ALDACTONE) 50 MG Tab TAKE 1 TABLET BY MOUTH EVERY DAY 90 Tablet 0    albuterol 108 (90 Base) MCG/ACT Aero Soln inhalation aerosol Inhale 2 Puffs every four hours as needed for Shortness of Breath. 1 Each 0    vitamin D3 (CHOLECALCIFEROL) 1000 Unit (25 mcg) Tab Take 2,000 Units by mouth every day.      Acetaminophen (TYLENOL PO) Take  by mouth.      IBUPROFEN PO Take  by mouth.      inFLIXimab (REMICADE) 100 MG Recon Soln Infuse  into a venous catheter.      Adapalene 0.3 % Gel       Omega-3 Fatty Acids (FISH OIL) 1000 MG Cap capsule Take 1,000 mg by mouth every day.      therapeutic multivitamin-minerals (THERAGRAN-M) Tab Take 1 Tab by mouth every day.      Probiotic Product (PROBIOTIC-10 PO) Take  by mouth.       No current facility-administered medications on file prior to visit.       Allergies: Azathioprine, Hydrocortisone sod succinate,  "Sumatriptan, Infliximab, Lidocaine, Prochlorperazine, Promethazine, Zolmitriptan, Cedarwood oil, Grass extracts [gramineae pollens], Poison oak extract [extract of poison oak], and Pollen extract    ROS:   Review of Systems   Constitutional:  Negative for chills, diaphoresis, fever, malaise/fatigue and weight loss.   HENT:  Negative for congestion, ear discharge, ear pain, hearing loss, nosebleeds, sinus pain, sore throat and tinnitus.    Eyes:  Negative for blurred vision, double vision, photophobia, pain, discharge and redness.   Respiratory:  Positive for cough. Negative for hemoptysis, sputum production, shortness of breath, wheezing and stridor.    Cardiovascular:  Negative for chest pain, palpitations, orthopnea, claudication, leg swelling and PND.   Gastrointestinal:  Negative for abdominal pain, constipation, diarrhea, heartburn, nausea and vomiting.   Genitourinary:  Negative for dysuria and urgency.   Musculoskeletal:  Negative for back pain, falls, joint pain, myalgias and neck pain.   Skin:  Negative for itching and rash.   Neurological:  Negative for dizziness, tremors, speech change, focal weakness, weakness and headaches.   Endo/Heme/Allergies:  Negative for environmental allergies.   Psychiatric/Behavioral:  Negative for depression.      /86 (BP Location: Right arm, Patient Position: Sitting, BP Cuff Size: Adult)   Pulse 75   Resp 18   Ht 1.626 m (5' 4\")   Wt 88.5 kg (195 lb)   SpO2 97%     Physical Exam:  Physical Exam  Constitutional:       General: She is not in acute distress.     Appearance: Normal appearance. She is well-developed and normal weight.   HENT:      Head: Normocephalic and atraumatic.      Right Ear: External ear normal.      Left Ear: External ear normal.      Nose: Nose normal. No congestion.      Mouth/Throat:      Mouth: Mucous membranes are moist.      Pharynx: Oropharynx is clear. No oropharyngeal exudate.   Eyes:      General: No scleral icterus.     Extraocular " Movements: Extraocular movements intact.      Conjunctiva/sclera: Conjunctivae normal.      Pupils: Pupils are equal, round, and reactive to light.   Neck:      Vascular: No JVD.      Trachea: No tracheal deviation.   Cardiovascular:      Rate and Rhythm: Normal rate and regular rhythm.      Heart sounds: Normal heart sounds. No murmur heard.    No friction rub. No gallop.   Pulmonary:      Effort: Pulmonary effort is normal. No accessory muscle usage or respiratory distress.      Breath sounds: Normal breath sounds. No wheezing or rales.   Abdominal:      General: There is no distension.      Palpations: Abdomen is soft.      Tenderness: There is no abdominal tenderness.   Musculoskeletal:         General: No tenderness or deformity. Normal range of motion.      Cervical back: Normal range of motion and neck supple.      Right lower leg: No edema.      Left lower leg: No edema.   Lymphadenopathy:      Cervical: No cervical adenopathy.   Skin:     General: Skin is warm and dry.      Findings: No rash.      Nails: There is no clubbing.   Neurological:      Mental Status: She is alert and oriented to person, place, and time.      Cranial Nerves: No cranial nerve deficit.      Gait: Gait normal.   Psychiatric:         Behavior: Behavior normal.       PFTs as reviewed by me personally: None    Imaging as reviewed by me personally: as per hPI    Assessment:  1. Chronic cough  budesonide-formoterol (SYMBICORT) 160-4.5 MCG/ACT Aerosol    predniSONE (DELTASONE) 10 MG Tab    CT-CHEST (THORAX) W/O      2. Immunosuppression (HCC)        3. IBD (inflammatory bowel disease)  CT-CHEST (THORAX) W/O          Plan:  This is more subacute and has been recurrent since November.  I suspect there may be correlation to her inflammatory bowel disease or possibly immunosuppression.  Rather than stopping steroids after 40 mg for 5 days, we will taper them and increase her inhaled corticosteroid dose.  In the meantime we will obtain CT chest  given underlying immunosuppression and consider evaluation by ENT.  This does put patient at risk for opportunistic infections.  See discussion above.  CT chest.  Patient reports bowel issues are well controlled and denies symptoms of reflux.  But is on immunosuppression as per above.  Return in about 4 weeks (around 5/1/2023) for any MD 4-6 weeks with CT.

## 2023-04-10 ENCOUNTER — HOSPITAL ENCOUNTER (OUTPATIENT)
Dept: RADIOLOGY | Facility: MEDICAL CENTER | Age: 38
End: 2023-04-10
Attending: INTERNAL MEDICINE
Payer: COMMERCIAL

## 2023-04-10 DIAGNOSIS — K52.9 IBD (INFLAMMATORY BOWEL DISEASE): ICD-10-CM

## 2023-04-10 DIAGNOSIS — R05.3 CHRONIC COUGH: ICD-10-CM

## 2023-04-10 PROCEDURE — 71250 CT THORAX DX C-: CPT

## 2023-04-11 ENCOUNTER — PATIENT MESSAGE (OUTPATIENT)
Dept: SLEEP MEDICINE | Facility: MEDICAL CENTER | Age: 38
End: 2023-04-11
Payer: COMMERCIAL

## 2023-04-11 DIAGNOSIS — R05.3 CHRONIC COUGH: ICD-10-CM

## 2023-04-11 DIAGNOSIS — R93.89 ABNORMAL CT OF THE CHEST: ICD-10-CM

## 2023-04-11 DIAGNOSIS — E28.2 PCOS (POLYCYSTIC OVARIAN SYNDROME): ICD-10-CM

## 2023-04-11 RX ORDER — SPIRONOLACTONE 50 MG/1
50 TABLET, FILM COATED ORAL DAILY
Qty: 90 TABLET | Refills: 0 | Status: SHIPPED | OUTPATIENT
Start: 2023-04-11 | End: 2023-07-13

## 2023-04-14 DIAGNOSIS — R05.3 CHRONIC COUGH: ICD-10-CM

## 2023-04-14 DIAGNOSIS — D84.9 IMMUNOSUPPRESSION (HCC): ICD-10-CM

## 2023-04-25 ENCOUNTER — APPOINTMENT (OUTPATIENT)
Dept: ADMISSIONS | Facility: MEDICAL CENTER | Age: 38
End: 2023-04-25
Attending: OTOLARYNGOLOGY
Payer: COMMERCIAL

## 2023-05-12 ENCOUNTER — PRE-ADMISSION TESTING (OUTPATIENT)
Dept: ADMISSIONS | Facility: MEDICAL CENTER | Age: 38
End: 2023-05-12
Attending: OTOLARYNGOLOGY
Payer: COMMERCIAL

## 2023-05-15 ENCOUNTER — OFFICE VISIT (OUTPATIENT)
Dept: SLEEP MEDICINE | Facility: MEDICAL CENTER | Age: 38
End: 2023-05-15
Attending: INTERNAL MEDICINE
Payer: COMMERCIAL

## 2023-05-15 VITALS
HEART RATE: 82 BPM | WEIGHT: 200 LBS | BODY MASS INDEX: 34.15 KG/M2 | HEIGHT: 64 IN | DIASTOLIC BLOOD PRESSURE: 86 MMHG | OXYGEN SATURATION: 96 % | RESPIRATION RATE: 16 BRPM | SYSTOLIC BLOOD PRESSURE: 124 MMHG

## 2023-05-15 DIAGNOSIS — K52.9 IBD (INFLAMMATORY BOWEL DISEASE): ICD-10-CM

## 2023-05-15 DIAGNOSIS — R93.89 ABNORMAL CT OF THE CHEST: ICD-10-CM

## 2023-05-15 DIAGNOSIS — R05.3 CHRONIC COUGH: ICD-10-CM

## 2023-05-15 PROCEDURE — 99212 OFFICE O/P EST SF 10 MIN: CPT | Performed by: INTERNAL MEDICINE

## 2023-05-15 PROCEDURE — 99214 OFFICE O/P EST MOD 30 MIN: CPT | Performed by: INTERNAL MEDICINE

## 2023-05-15 PROCEDURE — 3074F SYST BP LT 130 MM HG: CPT | Performed by: INTERNAL MEDICINE

## 2023-05-15 PROCEDURE — 3079F DIAST BP 80-89 MM HG: CPT | Performed by: INTERNAL MEDICINE

## 2023-05-15 PROCEDURE — 1126F AMNT PAIN NOTED NONE PRSNT: CPT | Performed by: INTERNAL MEDICINE

## 2023-05-15 ASSESSMENT — ENCOUNTER SYMPTOMS
BACK PAIN: 0
STRIDOR: 0
NECK PAIN: 0
MYALGIAS: 0
CONSTIPATION: 0
WEAKNESS: 0
WHEEZING: 0
PND: 0
FOCAL WEAKNESS: 0
EYE PAIN: 0
SPEECH CHANGE: 0
DOUBLE VISION: 0
DIARRHEA: 0
PALPITATIONS: 0
SINUS PAIN: 0
SPUTUM PRODUCTION: 0
HEADACHES: 0
SORE THROAT: 0
PHOTOPHOBIA: 0
EYE REDNESS: 0
NAUSEA: 0
ORTHOPNEA: 0
HEMOPTYSIS: 0
FEVER: 0
HEARTBURN: 0
DIZZINESS: 0
EYE DISCHARGE: 0
CLAUDICATION: 0
DIAPHORESIS: 0
CHILLS: 0
SHORTNESS OF BREATH: 0
WEIGHT LOSS: 0
BLURRED VISION: 0
FALLS: 0
COUGH: 1
DEPRESSION: 0
TREMORS: 0
ABDOMINAL PAIN: 0
VOMITING: 0

## 2023-05-15 ASSESSMENT — FIBROSIS 4 INDEX: FIB4 SCORE: 0.4

## 2023-05-15 NOTE — PROGRESS NOTES
Chief Complaint   Patient presents with    Cough     LAST SEEN 04/03/2023 WITH DR. LOO    Results     CHEST CT 04/11/2023         HPI: This patient is a 37 y.o. female whom is followed in our clinic for chronic cough last seen by me on 4/3/23.  The patient's past medical history significant for inflammatory bowel disease, specifically Crohn's on Remicade therapy and a history of methotrexate but not currently on this, PCOS and migraines.  She has mild eczema but denies environmental allergies.  No history of asthma or pulmonary disease.  She is a lifelong non-smoker.  She uses edible marijuana for Crohn's related pain but does not smoke marijuana.  Family history is notable for multiple family members with Crohn's disease and a mother with multiple sclerosis.  The patient's recent respiratory history began in November when she developed sinus congestion, ear pain and nasal drainage which developed into cough.  Cough persisted for a month in December she was treated with a Medrol Dosepak and doxycycline with improvement in symptoms.  She then suffered from COVID-19 in January 23 was treated with Paxlovid but symptoms were mild and resolved quickly.  She was asymptomatic until early March when she again developed sinus congestion with right-sided ear pain, sore throat which then developed into cough.  She was treated again with Medrol Dosepak and doxycycline at the end of March however 4 days after stopping Medrol and doxycycline her cough returned this time worse.  She was on another steroid burst during our last visit and we extended it into a taper and increased ICS dose with symbicort 160. CT chest from 4/11/23 showed what looked like resolving pna in LLL. Her cough has improved significantly but does persist. She saw ENT with Dr. Shin and CT sinuses was unremarkable  however her adenoids were inflammed and are felt to likely be the source of her recurrent cough. She is scheduled to have adenoidectomy on 5/18.  No new concerns.     Past Medical History:   Diagnosis Date    Cancer (HCC)     Crohn's disease (HCC) 20111    Hip pain, bilateral     IBD (inflammatory bowel disease)     Kidney stone 2008 to 2015    4x (mostly on right side).     Migraines 1998    MRSA infection     sinus infection    Ovarian cyst     age 18, burst    PCOS (polycystic ovarian syndrome)     Takes spironolactone (ALDACTONE) 50 mg daily.    Pneumonia see CT scan    Recurrent sinusitis 01/14/2020    -Patient prefers antibiotic treatment at this time.  We have had an antibiotic stewardship conversation.  Clindamycin is the next step given that she failed Augmentin and has confirmed MRSA on nasal swab.  However she has Crohn's disease and not comfortable prescribing this due to increased risk of C. difficile infection.  We will now try Bactrim and doxycycline for 10 days. -Patient aware of most    Syncope due to orthostatic hypotension        Social History     Socioeconomic History    Marital status:      Spouse name: Not on file    Number of children: Not on file    Years of education: Not on file    Highest education level: Not on file   Occupational History    Occupation: pharmceutical      Comment: Prior job   Tobacco Use    Smoking status: Never     Passive exposure: Never    Smokeless tobacco: Never   Vaping Use    Vaping Use: Never used   Substance and Sexual Activity    Alcohol use: Yes     Alcohol/week: 1.2 oz     Types: 2 Shots of liquor per week     Comment: once a week    Drug use: Not Currently     Frequency: 1.0 times per week     Types: Oral     Comment: past hallucinogens, smoking (vape)    Sexual activity: Yes     Partners: Female   Other Topics Concern     Service No    Blood Transfusions No    Caffeine Concern No    Occupational Exposure No    Hobby Hazards No    Sleep Concern No    Stress Concern No    Weight Concern No    Special Diet Yes    Back Care No    Exercise Yes    Bike Helmet No    Seat Belt Yes     Self-Exams No   Social History Narrative    Not on file     Social Determinants of Health     Financial Resource Strain: Not on file   Food Insecurity: Not on file   Transportation Needs: Not on file   Physical Activity: Not on file   Stress: Not on file   Social Connections: Not on file   Intimate Partner Violence: Not on file   Housing Stability: Not on file       Family History   Problem Relation Age of Onset    Breast Cancer Mother         BRCA negative    Other Mother         MS, fibroids/hysterectomy    GI Disease Maternal Aunt         Crohn's    Cancer Maternal Grandmother         uterine or fibrioids?, breast, skin, lung    Lung Disease Maternal Grandmother         emphysema    Psychiatric Illness Maternal Grandmother         suicide    GI Disease Maternal Grandfather         Crohn's       Current Outpatient Medications on File Prior to Visit   Medication Sig Dispense Refill    spironolactone (ALDACTONE) 50 MG Tab TAKE 1 TABLET BY MOUTH EVERY DAY 90 Tablet 0    topiramate (TOPAMAX) 25 MG capsule TAKE 1 CAPSULE BY MOUTH TWICE A  Capsule 0    benzonatate (TESSALON) 100 MG Cap Take 1 Capsule by mouth at bedtime as needed for Cough. 30 Capsule 0    albuterol 108 (90 Base) MCG/ACT Aero Soln inhalation aerosol Inhale 2 Puffs every four hours as needed for Shortness of Breath. 1 Each 0    vitamin D3 (CHOLECALCIFEROL) 1000 Unit (25 mcg) Tab Take 2,000 Units by mouth every day.      Acetaminophen (TYLENOL PO) Take  by mouth.      IBUPROFEN PO Take  by mouth as needed.      inFLIXimab (REMICADE) 100 MG Recon Soln Infuse  into a venous catheter. Every 8 weeks.      Adapalene 0.3 % Gel       Omega-3 Fatty Acids (FISH OIL) 1000 MG Cap capsule Take 1,000 mg by mouth every day.      therapeutic multivitamin-minerals (THERAGRAN-M) Tab Take 1 Tab by mouth every day.      Probiotic Product (PROBIOTIC-10 PO) Take  by mouth.      budesonide-formoterol (SYMBICORT) 160-4.5 MCG/ACT Aerosol Inhale 2 Puffs 2 times a day. Use  "spacer. Rinse mouth after each use. 1 Each 6     No current facility-administered medications on file prior to visit.       Azathioprine, Hydrocortisone sod succinate, Sumatriptan, Infliximab, Lidocaine, Prochlorperazine, Promethazine, Zolmitriptan, Cedarwood oil, Grass extracts [gramineae pollens], Poison oak extract [extract of poison oak], and Pollen extract      ROS:   Review of Systems   Constitutional:  Negative for chills, diaphoresis, fever, malaise/fatigue and weight loss.   HENT:  Negative for congestion, ear discharge, ear pain, hearing loss, nosebleeds, sinus pain, sore throat and tinnitus.    Eyes:  Negative for blurred vision, double vision, photophobia, pain, discharge and redness.   Respiratory:  Positive for cough. Negative for hemoptysis, sputum production, shortness of breath, wheezing and stridor.    Cardiovascular:  Negative for chest pain, palpitations, orthopnea, claudication, leg swelling and PND.   Gastrointestinal:  Negative for abdominal pain, constipation, diarrhea, heartburn, nausea and vomiting.   Genitourinary:  Negative for dysuria and urgency.   Musculoskeletal:  Negative for back pain, falls, joint pain, myalgias and neck pain.   Skin:  Negative for itching and rash.   Neurological:  Negative for dizziness, tremors, speech change, focal weakness, weakness and headaches.   Endo/Heme/Allergies:  Negative for environmental allergies.   Psychiatric/Behavioral:  Negative for depression.        /86 (BP Location: Left arm, Patient Position: Sitting, BP Cuff Size: Large adult)   Pulse 82   Resp 16   Ht 1.626 m (5' 4\")   Wt 90.7 kg (200 lb)   SpO2 96%   Physical Exam  Constitutional:       General: She is not in acute distress.     Appearance: Normal appearance. She is well-developed and normal weight.   HENT:      Head: Normocephalic and atraumatic.      Right Ear: External ear normal.      Left Ear: External ear normal.      Nose: Nose normal. No congestion.      Mouth/Throat: "      Mouth: Mucous membranes are moist.      Pharynx: Oropharynx is clear. No oropharyngeal exudate.   Eyes:      General: No scleral icterus.     Extraocular Movements: Extraocular movements intact.      Conjunctiva/sclera: Conjunctivae normal.      Pupils: Pupils are equal, round, and reactive to light.   Neck:      Vascular: No JVD.      Trachea: No tracheal deviation.   Cardiovascular:      Rate and Rhythm: Normal rate and regular rhythm.      Heart sounds: Normal heart sounds. No murmur heard.     No friction rub. No gallop.   Pulmonary:      Effort: Pulmonary effort is normal. No accessory muscle usage or respiratory distress.      Breath sounds: Normal breath sounds. No wheezing or rales.   Abdominal:      General: There is no distension.      Palpations: Abdomen is soft.      Tenderness: There is no abdominal tenderness.   Musculoskeletal:         General: No tenderness or deformity. Normal range of motion.      Cervical back: Normal range of motion and neck supple.      Right lower leg: No edema.      Left lower leg: No edema.   Lymphadenopathy:      Cervical: No cervical adenopathy.   Skin:     General: Skin is warm and dry.      Findings: No rash.      Nails: There is no clubbing.   Neurological:      Mental Status: She is alert and oriented to person, place, and time.      Cranial Nerves: No cranial nerve deficit.      Gait: Gait normal.   Psychiatric:         Behavior: Behavior normal.       PFTs as reviewed by me personally: NA    Imaging as reviewed by me personally:  as per hpI    Assessment:  1. Abnormal CT of the chest  PULMONARY FUNCTION TESTS -Test requested: Complete Pulmonary Function Test      2. Chronic cough  PULMONARY FUNCTION TESTS -Test requested: Complete Pulmonary Function Test      3. IBD (inflammatory bowel disease)  PULMONARY FUNCTION TESTS -Test requested: Complete Pulmonary Function Test          Plan:  Sxs are improving and suspect that her current sxs are originating in upper  airway. We will repeat a CT 4 weeks after her surgery and plan on PFT at time of f/u.   Presumed upper airway in origin and inflammatory/infectious in a pt on immunosuppression. Appreciate ENT input and assistance. We will continue ICS/LABA for now and reassess post-operative  On chronic immunosuppression. No bronchiectasis or ILD on CT.   Return in about 5 months (around 10/15/2023) for PFT at time of f/u .

## 2023-05-18 ENCOUNTER — ANESTHESIA EVENT (OUTPATIENT)
Dept: SURGERY | Facility: MEDICAL CENTER | Age: 38
End: 2023-05-18
Payer: COMMERCIAL

## 2023-05-18 ENCOUNTER — HOSPITAL ENCOUNTER (OUTPATIENT)
Facility: MEDICAL CENTER | Age: 38
End: 2023-05-18
Attending: OTOLARYNGOLOGY | Admitting: OTOLARYNGOLOGY
Payer: COMMERCIAL

## 2023-05-18 ENCOUNTER — ANESTHESIA (OUTPATIENT)
Dept: SURGERY | Facility: MEDICAL CENTER | Age: 38
End: 2023-05-18
Payer: COMMERCIAL

## 2023-05-18 VITALS
OXYGEN SATURATION: 94 % | RESPIRATION RATE: 20 BRPM | SYSTOLIC BLOOD PRESSURE: 106 MMHG | HEART RATE: 81 BPM | WEIGHT: 198.41 LBS | DIASTOLIC BLOOD PRESSURE: 65 MMHG | HEIGHT: 64 IN | TEMPERATURE: 97 F | BODY MASS INDEX: 33.87 KG/M2

## 2023-05-18 DIAGNOSIS — J35.02 ADENOIDITIS, CHRONIC: ICD-10-CM

## 2023-05-18 LAB
ANION GAP SERPL CALC-SCNC: 13 MMOL/L (ref 7–16)
BUN SERPL-MCNC: 13 MG/DL (ref 8–22)
CALCIUM SERPL-MCNC: 8.8 MG/DL (ref 8.5–10.5)
CHLORIDE SERPL-SCNC: 110 MMOL/L (ref 96–112)
CO2 SERPL-SCNC: 18 MMOL/L (ref 20–33)
CREAT SERPL-MCNC: 0.79 MG/DL (ref 0.5–1.4)
GFR SERPLBLD CREATININE-BSD FMLA CKD-EPI: 100 ML/MIN/1.73 M 2
GLUCOSE SERPL-MCNC: 88 MG/DL (ref 65–99)
GRAM STN SPEC: NORMAL
PATHOLOGY CONSULT NOTE: NORMAL
POTASSIUM SERPL-SCNC: 3.9 MMOL/L (ref 3.6–5.5)
SIGNIFICANT IND 70042: NORMAL
SITE SITE: NORMAL
SODIUM SERPL-SCNC: 141 MMOL/L (ref 135–145)
SOURCE SOURCE: NORMAL

## 2023-05-18 PROCEDURE — 00170 ANES INTRAORAL PX NOS: CPT | Performed by: ANESTHESIOLOGY

## 2023-05-18 PROCEDURE — 87070 CULTURE OTHR SPECIMN AEROBIC: CPT

## 2023-05-18 PROCEDURE — 88342 IMHCHEM/IMCYTCHM 1ST ANTB: CPT

## 2023-05-18 PROCEDURE — 160027 HCHG SURGERY MINUTES - 1ST 30 MINS LEVEL 2: Performed by: OTOLARYNGOLOGY

## 2023-05-18 PROCEDURE — 700105 HCHG RX REV CODE 258: Performed by: OTOLARYNGOLOGY

## 2023-05-18 PROCEDURE — 88341 IMHCHEM/IMCYTCHM EA ADD ANTB: CPT

## 2023-05-18 PROCEDURE — 87205 SMEAR GRAM STAIN: CPT

## 2023-05-18 PROCEDURE — 160025 RECOVERY II MINUTES (STATS): Performed by: OTOLARYNGOLOGY

## 2023-05-18 PROCEDURE — 87077 CULTURE AEROBIC IDENTIFY: CPT

## 2023-05-18 PROCEDURE — 88305 TISSUE EXAM BY PATHOLOGIST: CPT

## 2023-05-18 PROCEDURE — 87076 CULTURE ANAEROBE IDENT EACH: CPT

## 2023-05-18 PROCEDURE — 700101 HCHG RX REV CODE 250: Performed by: ANESTHESIOLOGY

## 2023-05-18 PROCEDURE — 160009 HCHG ANES TIME/MIN: Performed by: OTOLARYNGOLOGY

## 2023-05-18 PROCEDURE — 160035 HCHG PACU - 1ST 60 MINS PHASE I: Performed by: OTOLARYNGOLOGY

## 2023-05-18 PROCEDURE — 160046 HCHG PACU - 1ST 60 MINS PHASE II: Performed by: OTOLARYNGOLOGY

## 2023-05-18 PROCEDURE — 88360 TUMOR IMMUNOHISTOCHEM/MANUAL: CPT

## 2023-05-18 PROCEDURE — 160038 HCHG SURGERY MINUTES - EA ADDL 1 MIN LEVEL 2: Performed by: OTOLARYNGOLOGY

## 2023-05-18 PROCEDURE — 700102 HCHG RX REV CODE 250 W/ 637 OVERRIDE(OP): Performed by: ANESTHESIOLOGY

## 2023-05-18 PROCEDURE — 700111 HCHG RX REV CODE 636 W/ 250 OVERRIDE (IP): Performed by: ANESTHESIOLOGY

## 2023-05-18 PROCEDURE — A9270 NON-COVERED ITEM OR SERVICE: HCPCS | Performed by: ANESTHESIOLOGY

## 2023-05-18 PROCEDURE — 160002 HCHG RECOVERY MINUTES (STAT): Performed by: OTOLARYNGOLOGY

## 2023-05-18 PROCEDURE — 87075 CULTR BACTERIA EXCEPT BLOOD: CPT

## 2023-05-18 PROCEDURE — 36415 COLL VENOUS BLD VENIPUNCTURE: CPT

## 2023-05-18 PROCEDURE — 160048 HCHG OR STATISTICAL LEVEL 1-5: Performed by: OTOLARYNGOLOGY

## 2023-05-18 PROCEDURE — 80048 BASIC METABOLIC PNL TOTAL CA: CPT

## 2023-05-18 RX ORDER — MIDAZOLAM HYDROCHLORIDE 1 MG/ML
INJECTION INTRAMUSCULAR; INTRAVENOUS PRN
Status: DISCONTINUED | OUTPATIENT
Start: 2023-05-18 | End: 2023-05-18 | Stop reason: SURG

## 2023-05-18 RX ORDER — HYDROMORPHONE HYDROCHLORIDE 1 MG/ML
0.4 INJECTION, SOLUTION INTRAMUSCULAR; INTRAVENOUS; SUBCUTANEOUS
Status: DISCONTINUED | OUTPATIENT
Start: 2023-05-18 | End: 2023-05-18 | Stop reason: HOSPADM

## 2023-05-18 RX ORDER — LIDOCAINE HYDROCHLORIDE 20 MG/ML
INJECTION, SOLUTION EPIDURAL; INFILTRATION; INTRACAUDAL; PERINEURAL PRN
Status: DISCONTINUED | OUTPATIENT
Start: 2023-05-18 | End: 2023-05-18 | Stop reason: SURG

## 2023-05-18 RX ORDER — ONDANSETRON 2 MG/ML
4 INJECTION INTRAMUSCULAR; INTRAVENOUS
Status: DISCONTINUED | OUTPATIENT
Start: 2023-05-18 | End: 2023-05-18 | Stop reason: HOSPADM

## 2023-05-18 RX ORDER — ONDANSETRON 2 MG/ML
INJECTION INTRAMUSCULAR; INTRAVENOUS PRN
Status: DISCONTINUED | OUTPATIENT
Start: 2023-05-18 | End: 2023-05-18 | Stop reason: SURG

## 2023-05-18 RX ORDER — DIPHENHYDRAMINE HYDROCHLORIDE 50 MG/ML
12.5 INJECTION INTRAMUSCULAR; INTRAVENOUS
Status: DISCONTINUED | OUTPATIENT
Start: 2023-05-18 | End: 2023-05-18 | Stop reason: HOSPADM

## 2023-05-18 RX ORDER — CELECOXIB 200 MG/1
200 CAPSULE ORAL ONCE
Status: COMPLETED | OUTPATIENT
Start: 2023-05-18 | End: 2023-05-18

## 2023-05-18 RX ORDER — OXYCODONE HCL 5 MG/5 ML
5 SOLUTION, ORAL ORAL
Status: DISCONTINUED | OUTPATIENT
Start: 2023-05-18 | End: 2023-05-18 | Stop reason: HOSPADM

## 2023-05-18 RX ORDER — OXYCODONE HCL 5 MG/5 ML
10 SOLUTION, ORAL ORAL
Status: DISCONTINUED | OUTPATIENT
Start: 2023-05-18 | End: 2023-05-18 | Stop reason: HOSPADM

## 2023-05-18 RX ORDER — HALOPERIDOL 5 MG/ML
1 INJECTION INTRAMUSCULAR
Status: DISCONTINUED | OUTPATIENT
Start: 2023-05-18 | End: 2023-05-18 | Stop reason: HOSPADM

## 2023-05-18 RX ORDER — AMOXICILLIN AND CLAVULANATE POTASSIUM 875; 125 MG/1; MG/1
1 TABLET, FILM COATED ORAL 2 TIMES DAILY
Qty: 20 TABLET | Refills: 0 | Status: SHIPPED | OUTPATIENT
Start: 2023-05-18 | End: 2023-05-28

## 2023-05-18 RX ORDER — HYDROMORPHONE HYDROCHLORIDE 1 MG/ML
0.2 INJECTION, SOLUTION INTRAMUSCULAR; INTRAVENOUS; SUBCUTANEOUS
Status: DISCONTINUED | OUTPATIENT
Start: 2023-05-18 | End: 2023-05-18 | Stop reason: HOSPADM

## 2023-05-18 RX ORDER — DEXAMETHASONE SODIUM PHOSPHATE 4 MG/ML
INJECTION, SOLUTION INTRA-ARTICULAR; INTRALESIONAL; INTRAMUSCULAR; INTRAVENOUS; SOFT TISSUE PRN
Status: DISCONTINUED | OUTPATIENT
Start: 2023-05-18 | End: 2023-05-18 | Stop reason: SURG

## 2023-05-18 RX ORDER — SODIUM CHLORIDE, SODIUM LACTATE, POTASSIUM CHLORIDE, CALCIUM CHLORIDE 600; 310; 30; 20 MG/100ML; MG/100ML; MG/100ML; MG/100ML
INJECTION, SOLUTION INTRAVENOUS CONTINUOUS
Status: DISCONTINUED | OUTPATIENT
Start: 2023-05-18 | End: 2023-05-18 | Stop reason: HOSPADM

## 2023-05-18 RX ORDER — ROCURONIUM BROMIDE 10 MG/ML
INJECTION, SOLUTION INTRAVENOUS PRN
Status: DISCONTINUED | OUTPATIENT
Start: 2023-05-18 | End: 2023-05-18 | Stop reason: SURG

## 2023-05-18 RX ORDER — MEPERIDINE HYDROCHLORIDE 25 MG/ML
6.25 INJECTION INTRAMUSCULAR; INTRAVENOUS; SUBCUTANEOUS
Status: DISCONTINUED | OUTPATIENT
Start: 2023-05-18 | End: 2023-05-18 | Stop reason: HOSPADM

## 2023-05-18 RX ORDER — OXYMETAZOLINE HYDROCHLORIDE 0.05 G/100ML
SPRAY NASAL
Status: DISCONTINUED
Start: 2023-05-18 | End: 2023-05-18 | Stop reason: HOSPADM

## 2023-05-18 RX ORDER — CEFAZOLIN SODIUM 1 G/3ML
INJECTION, POWDER, FOR SOLUTION INTRAMUSCULAR; INTRAVENOUS PRN
Status: DISCONTINUED | OUTPATIENT
Start: 2023-05-18 | End: 2023-05-18 | Stop reason: SURG

## 2023-05-18 RX ORDER — ACETAMINOPHEN 500 MG
1000 TABLET ORAL ONCE
Status: COMPLETED | OUTPATIENT
Start: 2023-05-18 | End: 2023-05-18

## 2023-05-18 RX ORDER — HYDROMORPHONE HYDROCHLORIDE 1 MG/ML
0.1 INJECTION, SOLUTION INTRAMUSCULAR; INTRAVENOUS; SUBCUTANEOUS
Status: DISCONTINUED | OUTPATIENT
Start: 2023-05-18 | End: 2023-05-18 | Stop reason: HOSPADM

## 2023-05-18 RX ADMIN — LIDOCAINE HYDROCHLORIDE 100 MG: 20 INJECTION, SOLUTION EPIDURAL; INFILTRATION; INTRACAUDAL at 07:31

## 2023-05-18 RX ADMIN — MIDAZOLAM 2 MG: 1 INJECTION, SOLUTION INTRAMUSCULAR; INTRAVENOUS at 07:27

## 2023-05-18 RX ADMIN — PROPOFOL 200 MG: 10 INJECTION, EMULSION INTRAVENOUS at 07:31

## 2023-05-18 RX ADMIN — SUGAMMADEX 200 MG: 100 INJECTION, SOLUTION INTRAVENOUS at 07:58

## 2023-05-18 RX ADMIN — ONDANSETRON 4 MG: 2 INJECTION INTRAMUSCULAR; INTRAVENOUS at 07:58

## 2023-05-18 RX ADMIN — SODIUM CHLORIDE, POTASSIUM CHLORIDE, SODIUM LACTATE AND CALCIUM CHLORIDE: 600; 310; 30; 20 INJECTION, SOLUTION INTRAVENOUS at 06:48

## 2023-05-18 RX ADMIN — CEFAZOLIN 2 G: 1 INJECTION, POWDER, FOR SOLUTION INTRAMUSCULAR; INTRAVENOUS at 07:38

## 2023-05-18 RX ADMIN — ACETAMINOPHEN 1000 MG: 500 TABLET, FILM COATED ORAL at 07:06

## 2023-05-18 RX ADMIN — CELECOXIB 200 MG: 200 CAPSULE ORAL at 07:06

## 2023-05-18 RX ADMIN — DEXAMETHASONE SODIUM PHOSPHATE 8 MG: 4 INJECTION INTRA-ARTICULAR; INTRALESIONAL; INTRAMUSCULAR; INTRAVENOUS; SOFT TISSUE at 07:40

## 2023-05-18 RX ADMIN — FENTANYL CITRATE 50 MCG: 50 INJECTION, SOLUTION INTRAMUSCULAR; INTRAVENOUS at 07:40

## 2023-05-18 RX ADMIN — FENTANYL CITRATE 50 MCG: 50 INJECTION, SOLUTION INTRAMUSCULAR; INTRAVENOUS at 07:31

## 2023-05-18 RX ADMIN — ROCURONIUM BROMIDE 50 MG: 50 INJECTION, SOLUTION INTRAVENOUS at 07:31

## 2023-05-18 ASSESSMENT — PAIN DESCRIPTION - PAIN TYPE
TYPE: SURGICAL PAIN

## 2023-05-18 ASSESSMENT — PAIN SCALES - GENERAL: PAIN_LEVEL: 2

## 2023-05-18 ASSESSMENT — FIBROSIS 4 INDEX: FIB4 SCORE: 0.4

## 2023-05-18 NOTE — DISCHARGE INSTRUCTIONS
Tonsillectomy/ Adenoidectomy Discharge Instructions    A yellowish-white membrane normally forms in the throat where the tonsils were removed. This may cause a bad odor while the throat heals. Drinking , eating and chewing gum will diminish the odor. You may also notice excess saliva(spit) in your mouth for several days following surgery.     Bleeding is a major concern after surgery and may be avoided by not gargling, hacking, coughing, clearing there throat and drinking alcoholic beverages. If bleeding occurs, suck on ice chips for several minutes. Small amounts of blood in your saliva (spit) is normal from time to time. However, if the bleeding continues despite sucking on ice chips call your physician immediately.    ACTIVITIES: Your need for sleep with be increased for 2 to 4 weeks post surgery. It is usually advisable to rest at home for one week following surgery.     BATHING: You may shower or bathe after leaving the hospital. Avoid overly hot or steamy showers or baths. Swimming may be resumed in 2 weeks.     Ice: Ice collars to the neck may relieve some of the discomfort but the pain may last up to the fifth to seventh post surgical day.     DRIVING: You may drive whenever you are off pain medications.    If any questions arise, call your provider.  If your provider is not available, please feel free to call the Surgical Center at [phone].    MEDICATIONS: Resume taking daily medication.  Take prescribed pain medication with food.  If no medication is prescribed, you may take non-aspirin pain medication if needed.  PAIN MEDICATION CAN BE VERY CONSTIPATING.  Take a stool softener or laxative such as senokot, pericolace, or milk of magnesia if needed.    Last pain medication given Tylenol and Celebrex (similar to Ibuprofen) at 7:06am    What to Expect Post Anesthesia    Rest and take it easy for the first 24 hours.  A responsible adult is recommended to remain with you during that time.  It is normal  to feel sleepy.  We encourage you to not do anything that requires balance, judgment or coordination.    FOR 24 HOURS DO NOT:  Drive, operate machinery or run household appliances.  Drink beer or alcoholic beverages.  Make important decisions or sign legal documents.    To avoid nausea, slowly advance diet as tolerated, avoiding spicy or greasy foods for the first day.  Add more substantial food to your diet according to your provider's instructions.  Babies can be fed formula or breast milk as soon as they are hungry.  INCREASE FLUIDS AND FIBER TO AVOID CONSTIPATION.    MILD FLU-LIKE SYMPTOMS ARE NORMAL.  YOU MAY EXPERIENCE GENERALIZED MUSCLE ACHES, THROAT IRRITATION, HEADACHE AND/OR SOME NAUSEA.

## 2023-05-18 NOTE — OR SURGEON
Immediate Post OP Note    PreOp Diagnosis: adenoiditis       PostOp Diagnosis: same       Procedure(s):  ADENOIDECTOMY - Wound Class: Clean Contaminated    Surgeon(s):  Harsh Shin M.D.    Anesthesiologist/Type of Anesthesia:  Anesthesiologist: Mara Silva M.D./General    Surgical Staff:  Circulator: Olga Gonzales R.N.  Scrub Person: Tj Briceno    Specimens removed if any:  ID Type Source Tests Collected by Time Destination   1 : nasopharynx  Other Other AEROBIC/ANAEROBIC CULTURE (SURGERY) Harsh Shin M.D. 5/18/2023  7:46 AM    A : nasopharynx rule out lymphoma Other Other PATHOLOGY SPECIMEN Harsh Shin M.D. 5/18/2023  7:45 AM        Estimated Blood Loss: 10cc    Findings: purulence on adenoid    Complications: none         5/18/2023 7:57 AM Harsh Shin M.D.

## 2023-05-18 NOTE — ANESTHESIA TIME REPORT
Anesthesia Start and Stop Event Times     Date Time Event    5/18/2023 0702 Ready for Procedure     0727 Anesthesia Start     0806 Anesthesia Stop        Responsible Staff  05/18/23    Name Role Begin End    Mara Silva M.D. Anesth 0727 0806        Overtime Reason:  no overtime (within assigned shift)    Comments:

## 2023-05-18 NOTE — OR NURSING
0622 Pt refused her HCG pregnancy test this am, see refusel consent on chart.  Patient signed.      0627 Dr. Silva updated and ok with it.. Dr. Cobian will also update Dr Shin.      0651 Dr. Silva at bedside

## 2023-05-18 NOTE — OP REPORT
DATE OF SERVICE:  05/18/2023     PREOPERATIVE DIAGNOSIS:  Adenoiditis.     POSTOPERATIVE DIAGNOSIS:  Adenoiditis.     PROCEDURE:  Adenoidectomy.     SURGEON:  Harsh Shin MD     ASSISTANT:  None.     ANESTHESIA:  General endotracheal.     ANESTHESIOLOGIST:  Mara Silva MD.     ESTIMATED BLOOD LOSS:  10 mL.     SPECIMENS:  1.  Nasopharyngeal culture.  2.  Nasopharyngeal tissue for pathology.     INDICATIONS:  A 37-year-old who had multiple episodes of recurrent sinusitis   symptoms.  CT scan in the office was normal.  Nasal endoscopy showed purulence   on the adenoid.  I have discussed the risks, benefits and alternatives, she   elected to undergo the above procedure.     FINDINGS:  The adenoid was mildly enlarged with purulence on it.  This was   cultured.  The adenoid was resected.     DESCRIPTION OF PROCEDURE:  The patient was brought to the operating room,   intubated by anesthesia, given preoperative antibiotics and draped in sterile   fashion.  Timeout was performed.     A #3 mouth gag was used to open the mouth.  She was placed in suspension on   towels.  There was no submucosal cleft.  I placed a red rubber catheter   through each nostril and suspended the soft palate.  Using a mirror, I   visualized the adenoidal tissue.  I then used an adenoid curette to resect it.    This was sent to pathology and fresh to rule out a lymphoma.  I also   cultured it using swabs.  I then suction ablated the remaining adenoidal   tissue on a setting of 30 coag.  Hemostasis was good.  Mouth gag was removed.    Teeth were in same postoperative as preoperative condition.  Counts were   correct.  The patient was awakened from anesthesia and taken to recovery room   in stable condition.  The patient should be discharged on Augmentin.           ______________________________  Harsh Shin MD    JCM/ALTAGRACIA    DD:  05/18/2023 08:00  DT:  05/18/2023 08:25    Job#:  [id number]

## 2023-05-18 NOTE — ANESTHESIA POSTPROCEDURE EVALUATION
Patient: Magaly Toure    Procedure Summary     Date: 05/18/23 Room / Location: [facility] ROOM 22 / SURGERY SAME DAY [facility]    Anesthesia Start: 0727 Anesthesia Stop: 0806    Procedure: ADENOIDECTOMY (Throat) Diagnosis: (adenoiditis)    Surgeons: Harsh Shin M.D. Responsible Provider: Mara Silva M.D.    Anesthesia Type: general ASA Status: 2          Final Anesthesia Type: general  Last vitals  BP   Blood Pressure: 106/65    Temp   36.1 °C (97 °F)    Pulse   81   Resp   20    SpO2   94 %      Anesthesia Post Evaluation    Patient location during evaluation: PACU  Patient participation: complete - patient participated  Level of consciousness: awake and alert  Pain score: 2    Airway patency: patent  Anesthetic complications: no  Cardiovascular status: hemodynamically stable  Respiratory status: acceptable  Hydration status: euvolemic    PONV: none          No notable events documented.     Nurse Pain Score: 3 (NPRS)

## 2023-05-18 NOTE — OR NURSING
0803 Patient arrived to PACU. Report received from anesthesia and OR RN. Patient on 15L of oxygen via mask. Placed on monitor. Patient sleeping, no drainage noted.     0810 Patient awake, on 6L of O2. Tolerating sips of water.     0830 Patients mother updated on recovery. Transitioned to room air. Declines any needs for pain or nausea.     0842 Transitioned to phase 2.     0932 Patient discharged with mother. Discharge education provided and questions answered. Educated on medications sent to pharmacy. PIV removed. All belongings gathered and sent with patient.

## 2023-05-18 NOTE — ANESTHESIA PREPROCEDURE EVALUATION
Case: [id number] Date/Time: 05/18/23 0715    Procedure: ADENOIDECTOMY (Throat)    Anesthesia type: General    Pre-op diagnosis: Chronic adenoiditis     Location: [facility] ROOM 22 / SURGERY SAME DAY [facility]    Surgeons: Harsh Shin M.D.      36yo female c h/o chronic bronchitis, chron's disease, occ marijuana use    Relevant Problems   NEURO   (positive) Intractable migraine with aura without status migrainosus      CARDIAC   (positive) Intractable migraine with aura without status migrainosus         (positive) Kidney stones       Physical Exam    Airway   Mallampati: I  TM distance: >3 FB  Neck ROM: full       Cardiovascular - normal exam  Rhythm: regular  Rate: normal  (-) murmur     Dental - normal exam           Pulmonary - normal exam  Breath sounds clear to auscultation     Abdominal    Neurological - normal exam                 Anesthesia Plan    ASA 2       Plan - general       Airway plan will be ETT          Induction: intravenous    Postoperative Plan: Postoperative administration of opioids is intended.    Pertinent diagnostic labs and testing reviewed    Informed Consent:    Anesthetic plan and risks discussed with patient.    Use of blood products discussed with: patient whom consented to blood products.

## 2023-05-20 LAB
BACTERIA WND AEROBE CULT: ABNORMAL
BACTERIA WND AEROBE CULT: ABNORMAL
GRAM STN SPEC: ABNORMAL
SIGNIFICANT IND 70042: ABNORMAL
SITE SITE: ABNORMAL
SOURCE SOURCE: ABNORMAL

## 2023-05-22 ENCOUNTER — APPOINTMENT (OUTPATIENT)
Dept: RADIOLOGY | Facility: MEDICAL CENTER | Age: 38
End: 2023-05-22
Attending: INTERNAL MEDICINE
Payer: COMMERCIAL

## 2023-05-25 LAB
BACTERIA SPEC ANAEROBE CULT: ABNORMAL
BACTERIA SPEC ANAEROBE CULT: ABNORMAL
SIGNIFICANT IND 70042: ABNORMAL
SITE SITE: ABNORMAL
SOURCE SOURCE: ABNORMAL

## 2023-06-02 DIAGNOSIS — G43.119 INTRACTABLE MIGRAINE WITH AURA WITHOUT STATUS MIGRAINOSUS: ICD-10-CM

## 2023-06-02 RX ORDER — TOPIRAMATE SPINKLE 25 MG/1
CAPSULE ORAL
Qty: 180 CAPSULE | Refills: 0 | Status: SHIPPED | OUTPATIENT
Start: 2023-06-02 | End: 2023-06-10 | Stop reason: SDUPTHER

## 2023-06-10 DIAGNOSIS — G43.119 INTRACTABLE MIGRAINE WITH AURA WITHOUT STATUS MIGRAINOSUS: ICD-10-CM

## 2023-06-12 RX ORDER — TOPIRAMATE SPINKLE 25 MG/1
25 CAPSULE ORAL 2 TIMES DAILY
Qty: 180 CAPSULE | Refills: 0 | Status: SHIPPED | OUTPATIENT
Start: 2023-06-12 | End: 2023-09-05

## 2023-07-13 DIAGNOSIS — E28.2 PCOS (POLYCYSTIC OVARIAN SYNDROME): ICD-10-CM

## 2023-07-13 RX ORDER — SPIRONOLACTONE 50 MG/1
50 TABLET, FILM COATED ORAL DAILY
Qty: 90 TABLET | Refills: 0 | Status: SHIPPED | OUTPATIENT
Start: 2023-07-13 | End: 2023-10-20

## 2023-09-04 DIAGNOSIS — G43.119 INTRACTABLE MIGRAINE WITH AURA WITHOUT STATUS MIGRAINOSUS: ICD-10-CM

## 2023-09-05 RX ORDER — TOPIRAMATE SPINKLE 25 MG/1
25 CAPSULE ORAL 2 TIMES DAILY
Qty: 180 CAPSULE | Refills: 0 | Status: SHIPPED | OUTPATIENT
Start: 2023-09-05 | End: 2023-09-17 | Stop reason: SDUPTHER

## 2023-09-17 DIAGNOSIS — G43.119 INTRACTABLE MIGRAINE WITH AURA WITHOUT STATUS MIGRAINOSUS: ICD-10-CM

## 2023-09-18 RX ORDER — TOPIRAMATE SPINKLE 25 MG/1
25 CAPSULE ORAL 2 TIMES DAILY
Qty: 180 CAPSULE | Refills: 0 | Status: SHIPPED | OUTPATIENT
Start: 2023-09-18 | End: 2023-12-19 | Stop reason: SDUPTHER

## 2023-10-09 ENCOUNTER — NON-PROVIDER VISIT (OUTPATIENT)
Dept: SLEEP MEDICINE | Facility: MEDICAL CENTER | Age: 38
End: 2023-10-09
Attending: INTERNAL MEDICINE
Payer: COMMERCIAL

## 2023-10-09 VITALS — BODY MASS INDEX: 34.15 KG/M2 | HEIGHT: 64 IN | WEIGHT: 200 LBS

## 2023-10-09 DIAGNOSIS — K52.9 IBD (INFLAMMATORY BOWEL DISEASE): ICD-10-CM

## 2023-10-09 DIAGNOSIS — R05.3 CHRONIC COUGH: ICD-10-CM

## 2023-10-09 DIAGNOSIS — R93.89 ABNORMAL CT OF THE CHEST: ICD-10-CM

## 2023-10-09 PROCEDURE — 94729 DIFFUSING CAPACITY: CPT | Mod: 26 | Performed by: INTERNAL MEDICINE

## 2023-10-09 PROCEDURE — 94729 DIFFUSING CAPACITY: CPT | Performed by: INTERNAL MEDICINE

## 2023-10-09 PROCEDURE — 94726 PLETHYSMOGRAPHY LUNG VOLUMES: CPT | Mod: 26 | Performed by: INTERNAL MEDICINE

## 2023-10-09 PROCEDURE — 94726 PLETHYSMOGRAPHY LUNG VOLUMES: CPT | Performed by: INTERNAL MEDICINE

## 2023-10-09 PROCEDURE — 94060 EVALUATION OF WHEEZING: CPT | Performed by: INTERNAL MEDICINE

## 2023-10-09 PROCEDURE — 94060 EVALUATION OF WHEEZING: CPT | Mod: 26 | Performed by: INTERNAL MEDICINE

## 2023-10-09 ASSESSMENT — PULMONARY FUNCTION TESTS
FEV1/FVC_PREDICTED: 83
FVC: 3.42
FEV1_LLN: 2.55
FEV1/FVC_PERCENT_CHANGE: 5
FEV1/FVC: 80
FEV1: 2.69
FEV1_PERCENT_CHANGE: 6
FEV1/FVC_PERCENT_PREDICTED: 97
FEV1/FVC_PERCENT_PREDICTED: 101
FEV1_PERCENT_PREDICTED: 94
FVC_PERCENT_PREDICTED: 91
FVC_PREDICTED: 3.68
FVC_PERCENT_PREDICTED: 92
FEV1_PREDICTED: 3.06
FEV1_PERCENT_PREDICTED: 88
FEV1/FVC_PERCENT_PREDICTED: 96
FEV1/FVC_PERCENT_LLN: 70
FVC_LLN: 3.07
FEV1/FVC: 80
FEV1/FVC: 85
FEV1/FVC_PERCENT_PREDICTED: 102
FEV1_PERCENT_CHANGE: 1
FEV1/FVC_PERCENT_PREDICTED: 83
FEV1/FVC: 84.8
FVC: 3.35
FEV1: 2.9
FEV1/FVC_PERCENT_CHANGE: 600

## 2023-10-09 ASSESSMENT — FIBROSIS 4 INDEX: FIB4 SCORE: 0.41

## 2023-10-09 NOTE — PROCEDURES
Technician: Caitie Hernandez RRT, CPFT  Good patient effort & cooperation.  The results of this test meet the ATS/ERS standards for acceptability & reproducibility.  Test was performed on the [employer] Body Plethysmograph-Elite DX system.  Predicted equations for Spirometry are GLI-2012, ITS for lung volumes, and GLI-2017 for DLCO.  The DLCO was uncorrected for Hgb.  A bronchodilator of Ventolin HFA -2puffs via spacer administered.  DLCO performed during dilation period. Patient C/O being claustrophobic, but was able to perform the Pleth maneuver without incident.    Interpretation;    Baseline spirometry shows normal airflows.  There is significant bronchodilator response based on absolute increase in FEV1.  Lung volumes are normal.  Diffusion capacity is normal.  Normal pulmonary function testing with normal flow volume loop.

## 2023-10-20 DIAGNOSIS — E28.2 PCOS (POLYCYSTIC OVARIAN SYNDROME): ICD-10-CM

## 2023-10-20 RX ORDER — SPIRONOLACTONE 50 MG/1
50 TABLET, FILM COATED ORAL DAILY
Qty: 90 TABLET | Refills: 0 | Status: SHIPPED | OUTPATIENT
Start: 2023-10-20 | End: 2024-01-22

## 2023-10-25 ENCOUNTER — APPOINTMENT (OUTPATIENT)
Dept: SLEEP MEDICINE | Facility: MEDICAL CENTER | Age: 38
End: 2023-10-25
Attending: INTERNAL MEDICINE
Payer: COMMERCIAL

## 2023-12-19 DIAGNOSIS — G43.119 INTRACTABLE MIGRAINE WITH AURA WITHOUT STATUS MIGRAINOSUS: ICD-10-CM

## 2023-12-21 RX ORDER — TOPIRAMATE SPINKLE 25 MG/1
25 CAPSULE ORAL 2 TIMES DAILY
Qty: 180 CAPSULE | Refills: 0 | Status: SHIPPED | OUTPATIENT
Start: 2023-12-21

## 2024-01-19 ENCOUNTER — OFFICE VISIT (OUTPATIENT)
Dept: MEDICAL GROUP | Facility: IMAGING CENTER | Age: 39
End: 2024-01-19
Payer: COMMERCIAL

## 2024-01-19 ENCOUNTER — HOSPITAL ENCOUNTER (OUTPATIENT)
Dept: RADIOLOGY | Facility: MEDICAL CENTER | Age: 39
End: 2024-01-19
Attending: PHYSICIAN ASSISTANT
Payer: COMMERCIAL

## 2024-01-19 VITALS
WEIGHT: 200.4 LBS | HEART RATE: 85 BPM | TEMPERATURE: 97.6 F | DIASTOLIC BLOOD PRESSURE: 74 MMHG | BODY MASS INDEX: 34.21 KG/M2 | SYSTOLIC BLOOD PRESSURE: 112 MMHG | HEIGHT: 64 IN | OXYGEN SATURATION: 97 %

## 2024-01-19 DIAGNOSIS — Z23 NEED FOR VACCINATION: ICD-10-CM

## 2024-01-19 DIAGNOSIS — F41.9 ANXIETY AND DEPRESSION: ICD-10-CM

## 2024-01-19 DIAGNOSIS — E28.2 PCOS (POLYCYSTIC OVARIAN SYNDROME): ICD-10-CM

## 2024-01-19 DIAGNOSIS — K50.00 CROHN'S DISEASE OF SMALL INTESTINE WITHOUT COMPLICATIONS (HCC): ICD-10-CM

## 2024-01-19 DIAGNOSIS — D21.9 FIBROID: ICD-10-CM

## 2024-01-19 DIAGNOSIS — F32.A ANXIETY AND DEPRESSION: ICD-10-CM

## 2024-01-19 DIAGNOSIS — K62.89 RECTAL PAIN: ICD-10-CM

## 2024-01-19 DIAGNOSIS — Z12.83 SKIN CANCER SCREENING: ICD-10-CM

## 2024-01-19 DIAGNOSIS — M53.3 COCCYX PAIN: ICD-10-CM

## 2024-01-19 DIAGNOSIS — G43.119 INTRACTABLE MIGRAINE WITH AURA WITHOUT STATUS MIGRAINOSUS: ICD-10-CM

## 2024-01-19 PROBLEM — R05.1 ACUTE COUGH: Status: RESOLVED | Noted: 2023-03-07 | Resolved: 2024-01-19

## 2024-01-19 PROCEDURE — 90472 IMMUNIZATION ADMIN EACH ADD: CPT | Performed by: PHYSICIAN ASSISTANT

## 2024-01-19 PROCEDURE — 90471 IMMUNIZATION ADMIN: CPT | Performed by: PHYSICIAN ASSISTANT

## 2024-01-19 PROCEDURE — 3078F DIAST BP <80 MM HG: CPT | Performed by: PHYSICIAN ASSISTANT

## 2024-01-19 PROCEDURE — 99214 OFFICE O/P EST MOD 30 MIN: CPT | Mod: 25 | Performed by: PHYSICIAN ASSISTANT

## 2024-01-19 PROCEDURE — 3074F SYST BP LT 130 MM HG: CPT | Performed by: PHYSICIAN ASSISTANT

## 2024-01-19 PROCEDURE — 72220 X-RAY EXAM SACRUM TAILBONE: CPT

## 2024-01-19 PROCEDURE — 90715 TDAP VACCINE 7 YRS/> IM: CPT | Performed by: PHYSICIAN ASSISTANT

## 2024-01-19 PROCEDURE — 90677 PCV20 VACCINE IM: CPT | Performed by: PHYSICIAN ASSISTANT

## 2024-01-19 RX ORDER — DULOXETIN HYDROCHLORIDE 30 MG/1
30 CAPSULE, DELAYED RELEASE ORAL DAILY
Qty: 30 CAPSULE | Refills: 0 | Status: SHIPPED | OUTPATIENT
Start: 2024-01-19 | End: 2024-02-06 | Stop reason: SDUPTHER

## 2024-01-19 ASSESSMENT — PATIENT HEALTH QUESTIONNAIRE - PHQ9: CLINICAL INTERPRETATION OF PHQ2 SCORE: 0

## 2024-01-19 ASSESSMENT — FIBROSIS 4 INDEX: FIB4 SCORE: 0.41

## 2024-01-19 NOTE — PROGRESS NOTES
Subjective:     CC:   Chief Complaint   Patient presents with    Referral Needed    Other     Bruised or broken tailbone, since November, can't sit normally, painful.    Medication Management     topamax       HPI:   Magaly presents today to discuss:    Coccyx pain  Patient states that she was at a conference 2 months ago where she had to sit for several hours on a hard metal folding chair.  Since then she has had pain along her tailbone.  Pain worse when sitting directly on the tailbone, sometimes has pain when bending forward.  Has chronic diarrhea related to Crohn's.  Taking Tylenol and as needed ibuprofen.  Ice helps with the pain.    Intractable migraine with aura without status migrainosus  Chronic, patient states that she has been having more frequent migraines.  Still on Topamax 25 mg twice a day.  Has also been having anxiety and depression.  Her therapist recommended meeting with a psychiatrist.    PCOS (polycystic ovarian syndrome)  Chronic, on spironolactone 50 mg daily.  Needs a new gynecologist referral.      Past Medical History:   Diagnosis Date    Anxiety and depression 1/19/2024    Cancer (HCC)     Crohn's disease (HCC) 20111    Hip pain, bilateral     IBD (inflammatory bowel disease)     Kidney stone 2008 to 2015    4x (mostly on right side).     Migraines 1998    MRSA infection     sinus infection    Ovarian cyst     age 18, burst    PCOS (polycystic ovarian syndrome)     Takes spironolactone (ALDACTONE) 50 mg daily.    Pneumonia see CT scan    Recurrent sinusitis 01/14/2020    -Patient prefers antibiotic treatment at this time.  We have had an antibiotic stewardship conversation.  Clindamycin is the next step given that she failed Augmentin and has confirmed MRSA on nasal swab.  However she has Crohn's disease and not comfortable prescribing this due to increased risk of C. difficile infection.  We will now try Bactrim and doxycycline for 10 days. -Patient aware of most    Syncope due to  orthostatic hypotension      Family History   Problem Relation Age of Onset    Breast Cancer Mother 57        BRCA negative    Other Mother         MS, fibroids/hysterectomy    GI Disease Maternal Aunt         Crohn's    Cancer Maternal Grandmother         uterine or fibrioids?, breast, skin, lung    Lung Disease Maternal Grandmother         emphysema    Psychiatric Illness Maternal Grandmother         suicide    GI Disease Maternal Grandfather         Crohn's     Past Surgical History:   Procedure Laterality Date    ADENOIDECTOMY N/A 5/18/2023    Procedure: ADENOIDECTOMY;  Surgeon: Harsh Shin M.D.;  Location: SURGERY SAME DAY [facility];  Service: Ent    LUMBAR TRANSFORAMINAL EPIDURAL STEROID INJECTION Right 05/17/2021    Procedure: RIGHT L4-5 transforaminal epidural steroid injection with sedation;  Surgeon: Godfrey Vigil M.D.;  Location: [facility];  Service: Pain Management    DENTAL EXTRACTION(S)      NO PERTINENT PAST SURGICAL HISTORY       Social History     Tobacco Use    Smoking status: Never     Passive exposure: Never    Smokeless tobacco: Never   Vaping Use    Vaping Use: Never used   Substance Use Topics    Alcohol use: Yes     Alcohol/week: 1.2 oz     Types: 2 Shots of liquor per week     Comment: once a week    Drug use: Not Currently     Frequency: 1.0 times per week     Types: Oral     Comment: past hallucinogens, smoking (vape)     Social History     Social History Narrative    Not on file     Current Outpatient Medications Ordered in Epic   Medication Sig Dispense Refill    DULoxetine (CYMBALTA) 30 MG Cap DR Particles Take 1 Capsule by mouth every day. 30 Capsule 0    topiramate (TOPAMAX) 25 MG capsule Take 1 Capsule by mouth 2 times a day. 180 Capsule 0    spironolactone (ALDACTONE) 50 MG Tab TAKE 1 TABLET BY MOUTH EVERY DAY 90 Tablet 0    budesonide-formoterol (SYMBICORT) 160-4.5 MCG/ACT Aerosol Inhale 2 Puffs 2 times a day. Use spacer. Rinse mouth after each use. 1 Each  "6    benzonatate (TESSALON) 100 MG Cap Take 1 Capsule by mouth at bedtime as needed for Cough. 30 Capsule 0    albuterol 108 (90 Base) MCG/ACT Aero Soln inhalation aerosol Inhale 2 Puffs every four hours as needed for Shortness of Breath. 1 Each 0    vitamin D3 (CHOLECALCIFEROL) 1000 Unit (25 mcg) Tab Take 2,000 Units by mouth every day.      Acetaminophen (TYLENOL PO) Take  by mouth.      IBUPROFEN PO Take  by mouth as needed.      inFLIXimab (REMICADE) 100 MG Recon Soln Infuse  into a venous catheter. Every 8 weeks.      Adapalene 0.3 % Gel       Omega-3 Fatty Acids (FISH OIL) 1000 MG Cap capsule Take 1,000 mg by mouth every day.      therapeutic multivitamin-minerals (THERAGRAN-M) Tab Take 1 Tab by mouth every day.      Probiotic Product (PROBIOTIC-10 PO) Take  by mouth.       No current Epic-ordered facility-administered medications on file.     Azathioprine, Hydrocortisone sod succinate, Sumatriptan, Infliximab, Lidocaine, Prochlorperazine, Promethazine, Zolmitriptan, Cedarwood oil, Grass extracts [gramineae pollens], Poison oak extract [extract of poison oak], and Pollen extract    PMH/PSH/FH/Social history reviewed.  Vaccinations discussed.  Previous records and labs reviewed. Discussed age appropriate anticipatory guidance.    ROS: see hpi  Gen: no fevers/chills  Pulm: no sob, no cough  CV: no chest pain, no palpitations, no edema  GI: no nausea/vomiting, no diarrhea  Skin: no rash    Objective:   Exam:  /74 (BP Location: Left arm, Patient Position: Sitting, BP Cuff Size: Adult)   Pulse 85   Temp 36.4 °C (97.6 °F) (Temporal)   Ht 1.626 m (5' 4\")   Wt 90.9 kg (200 lb 6.4 oz)   SpO2 97%   BMI 34.40 kg/m²    Body mass index is 34.4 kg/m².    Gen: Alert and oriented, No apparent distress.  HEENT: Head atraumatic, normocephalic. Pupils equal and round.  Neck: Neck is supple without lymphadenopathy.   Lungs: Normal effort, CTA bilaterally, no wheezes, rhonchi, or rales  CV: Regular rate and rhythm. No " murmurs, rubs, or gallops.  ABD: +BS. Non-tender, non-distended. No rebound, rigidity, or guarding.    No sacral, coccyx, or vertebral tenderness.  Ext: No clubbing, cyanosis, edema.    Assessment & Plan:     38 y.o. female with the following -     1. Coccyx pain  Check x-ray to assess bones, may need further imaging such as CT or MRI due to history of Crohn's, rule out fistula/abscess.  - DX-SACRUM AND COCCYX 2+; Future    2. Intractable migraine with aura without status migrainosus  Chronic, uncontrolled.  Continue Topamax.  Will add duloxetine due to anxiety and depression, may have some benefit with migraines.    3. Anxiety and depression  Chronic, uncontrolled.  Start Cymbalta 30 mg daily. Potential side effects include nausea, headache, diarrhea, decreased libido.  If you develop any of the side effects that last greater than 2 weeks or become severe, please notify my office and seek medical care.   - Referral to Psychiatry  - Referral to Psychology  - DULoxetine (CYMBALTA) 30 MG Cap DR Particles; Take 1 Capsule by mouth every day.  Dispense: 30 Capsule; Refill: 0    4. PCOS (polycystic ovarian syndrome)  - Referral to Gynecology    5. Fibroid  - Referral to Gynecology    6. Skin cancer screening  - Referral to Dermatology    7. Need for vaccination  - Pneumococcal Conjugate Vaccine 20-Valent (6 wks+)  - Tdap Vaccine =>8YO IM    Return in about 18 days (around 2/6/2024) for Annual physical.    Lena Randhawa PA-C ([place])  Physician Assistant Certified  [facility]    Please note that this dictation was created using voice recognition software. I have made every reasonable attempt to correct obvious errors, but I expect that there are errors of grammar and possibly content that I did not discover before finalizing the note.

## 2024-01-19 NOTE — ASSESSMENT & PLAN NOTE
Chronic, patient states that she has been having more frequent migraines.  Still on Topamax 25 mg twice a day.  Has also been having anxiety and depression.  Her therapist recommended meeting with a psychiatrist.   [facility]:  I discussed with the patient the risks of topical clascoterone including but not limited to erythema, scaling, itching, and stinging. Patient voiced their understanding.

## 2024-01-19 NOTE — PATIENT INSTRUCTIONS
It was a pleasure meeting with you today at [facility]!    Your medical history/records and medications were reviewed today.     UPDATE on MyChart Results: If you have blood work, and/or imaging studies, or any other test or procedure completed, you will have access to results as soon as they become available in MyChart. Recently, these results will be available for review at the same time that your provider is able to see results!    This will likely mean you will see a result before your provider has had a chance to review and discuss with you.  Some results or care notes may be hard to understand and may be serious in nature.    We look at every result and your provider will contact you to explain what they mean and discuss appropriate next steps. Please allow for at least 72 business hours for chart and result review.     We prefer that you wait for your care team to contact you with your results.  Often, your provider will discuss your results with you at your next appointment. We look forward to continuing to partner with you in your care.    Please review my practice information below:    If you have any prescription refill requests, please send them via [employer] or discuss with your provider at the start of your office visits. Please allow 3-5 business days for lab and testing review and you will be contacted via [employer] with those results, or if advised to make a follow up appointment regarding those results, then please do so.     Once resulted, your lab/test/imaging results will show up automatically in your MyChart. Please wait for my interpretation and recommendations prior to viewing your results to avoid any unnecessary confusion or misinterpretation. I will address all of the lab values that I interpret as abnormal and message you accordingly on your MyChart. I will always send you a message about your results even if they are normal. If you do not hear back from me within 5-7 business  days after completing your tests, then please send me a message on [employer] so I can obtain your results (especially if you went to an outside lab or imaging center - [facility], [facility], etc).     If you have any additional questions or concerns beyond my interpretation of your results, please make an appointment with me to discuss in further detail.    Please only use the [employer] messaging system for questions regarding your most recent appointment or if advised to use otherwise (glucose or blood pressure reporting).     If you have any new problems or concerns, you must make an appointment to discuss. This includes any referral requests, lab requests (unless advised to notify me for pre-appt labs), medication side effects, or request for medication adjustments.     Please arrive 15 minutes prior to your appointment time to complete your check-in and intake with the medical assistant.      Thank you,    Lena Randhawa PA-C ([place])  Physician Assistant Certified  [facility]    -----------------------------------------------------------------    Attn: Patients of [facility]:    In an effort to continue to provide excellent and efficient care to our patients, it is vital that we continue to use our resources appropriately. With that, this is a reminder that [employer] is used for prescription refill requests, test results, virtual visits, and chart review only.     Any new questions, concerns/conditions, lab/imaging requests, medication adjustments, new prescriptions, or referral requests do require an appointment (virtually or in person), unless discussed otherwise at your most recent appointment.     Thank you for your understanding,    [facility]

## 2024-01-19 NOTE — ASSESSMENT & PLAN NOTE
Patient states that she was at a conference 2 months ago where she had to sit for several hours on a hard metal folding chair.  Since then she has had pain along her tailbone.  Pain worse when sitting directly on the tailbone, sometimes has pain when bending forward.  Has chronic diarrhea related to Crohn's.  Taking Tylenol and as needed ibuprofen.  Ice helps with the pain.

## 2024-01-20 DIAGNOSIS — E28.2 PCOS (POLYCYSTIC OVARIAN SYNDROME): ICD-10-CM

## 2024-01-22 RX ORDER — SPIRONOLACTONE 50 MG/1
50 TABLET, FILM COATED ORAL DAILY
Qty: 90 TABLET | Refills: 3 | Status: SHIPPED | OUTPATIENT
Start: 2024-01-22

## 2024-01-22 NOTE — TELEPHONE ENCOUNTER
Received request via: Pharmacy    Was the patient seen in the last year in this department? Yes    Does the patient have an active prescription (recently filled or refills available) for medication(s) requested? No    Pharmacy Name: [facility]/pharmacy #[id number] - [place], [place] - [address] in [place]     Does the patient have USP Plus and need 100 day supply (blood pressure, diabetes and cholesterol meds only)? Patient does not have SCP

## 2024-02-02 NOTE — PROGRESS NOTES
CT pelvis with contrast ordered due to rectal pain and history of Crohn's disease, rule out fistula or abscess.  Sacrum/coccyx x-ray within normal limits.  No bony abnormality.  Low suspicion for musculoskeletal etiology. No gynecologic symptoms.      Lena Randhawa PA-C ([place])  Physician Assistant Certified  [facility]

## 2024-02-03 ENCOUNTER — APPOINTMENT (OUTPATIENT)
Dept: RADIOLOGY | Facility: MEDICAL CENTER | Age: 39
End: 2024-02-03
Attending: EMERGENCY MEDICINE
Payer: COMMERCIAL

## 2024-02-03 ENCOUNTER — HOSPITAL ENCOUNTER (EMERGENCY)
Facility: MEDICAL CENTER | Age: 39
End: 2024-02-03
Attending: EMERGENCY MEDICINE
Payer: COMMERCIAL

## 2024-02-03 VITALS
WEIGHT: 206.35 LBS | TEMPERATURE: 97.9 F | HEART RATE: 94 BPM | DIASTOLIC BLOOD PRESSURE: 91 MMHG | SYSTOLIC BLOOD PRESSURE: 148 MMHG | BODY MASS INDEX: 35.42 KG/M2 | RESPIRATION RATE: 16 BRPM | OXYGEN SATURATION: 93 %

## 2024-02-03 DIAGNOSIS — J11.1 INFLUENZA: ICD-10-CM

## 2024-02-03 DIAGNOSIS — R10.9 ABDOMINAL PAIN, UNSPECIFIED ABDOMINAL LOCATION: ICD-10-CM

## 2024-02-03 DIAGNOSIS — N28.89 PERINEPHRIC FLUID COLLECTION: ICD-10-CM

## 2024-02-03 DIAGNOSIS — K50.90 CROHN'S DISEASE WITHOUT COMPLICATION, UNSPECIFIED GASTROINTESTINAL TRACT LOCATION (HCC): ICD-10-CM

## 2024-02-03 LAB
ALBUMIN SERPL BCP-MCNC: 4 G/DL (ref 3.2–4.9)
ALBUMIN/GLOB SERPL: 1.3 G/DL
ALP SERPL-CCNC: 70 U/L (ref 30–99)
ALT SERPL-CCNC: 23 U/L (ref 2–50)
ANION GAP SERPL CALC-SCNC: 11 MMOL/L (ref 7–16)
APPEARANCE UR: CLEAR
AST SERPL-CCNC: 19 U/L (ref 12–45)
BASOPHILS # BLD AUTO: 0.5 % (ref 0–1.8)
BASOPHILS # BLD: 0.04 K/UL (ref 0–0.12)
BILIRUB SERPL-MCNC: 0.2 MG/DL (ref 0.1–1.5)
BILIRUB UR QL STRIP.AUTO: NEGATIVE
BUN SERPL-MCNC: 18 MG/DL (ref 8–22)
CALCIUM ALBUM COR SERPL-MCNC: 9.2 MG/DL (ref 8.5–10.5)
CALCIUM SERPL-MCNC: 9.2 MG/DL (ref 8.5–10.5)
CHLORIDE SERPL-SCNC: 105 MMOL/L (ref 96–112)
CO2 SERPL-SCNC: 19 MMOL/L (ref 20–33)
COLOR UR: YELLOW
CREAT SERPL-MCNC: 1.4 MG/DL (ref 0.5–1.4)
EOSINOPHIL # BLD AUTO: 0.01 K/UL (ref 0–0.51)
EOSINOPHIL NFR BLD: 0.1 % (ref 0–6.9)
ERYTHROCYTE [DISTWIDTH] IN BLOOD BY AUTOMATED COUNT: 38.7 FL (ref 35.9–50)
FLUAV RNA SPEC QL NAA+PROBE: POSITIVE
FLUBV RNA SPEC QL NAA+PROBE: NEGATIVE
GFR SERPLBLD CREATININE-BSD FMLA CKD-EPI: 50 ML/MIN/1.73 M 2
GLOBULIN SER CALC-MCNC: 3.2 G/DL (ref 1.9–3.5)
GLUCOSE SERPL-MCNC: 97 MG/DL (ref 65–99)
GLUCOSE UR STRIP.AUTO-MCNC: NEGATIVE MG/DL
HCG SERPL QL: NEGATIVE
HCT VFR BLD AUTO: 41.5 % (ref 37–47)
HGB BLD-MCNC: 14.7 G/DL (ref 12–16)
IMM GRANULOCYTES # BLD AUTO: 0.03 K/UL (ref 0–0.11)
IMM GRANULOCYTES NFR BLD AUTO: 0.4 % (ref 0–0.9)
KETONES UR STRIP.AUTO-MCNC: NEGATIVE MG/DL
LEUKOCYTE ESTERASE UR QL STRIP.AUTO: NEGATIVE
LIPASE SERPL-CCNC: 31 U/L (ref 11–82)
LYMPHOCYTES # BLD AUTO: 0.56 K/UL (ref 1–4.8)
LYMPHOCYTES NFR BLD: 6.8 % (ref 22–41)
MCH RBC QN AUTO: 31.8 PG (ref 27–33)
MCHC RBC AUTO-ENTMCNC: 35.4 G/DL (ref 32.2–35.5)
MCV RBC AUTO: 89.8 FL (ref 81.4–97.8)
MICRO URNS: NORMAL
MONOCYTES # BLD AUTO: 0.99 K/UL (ref 0–0.85)
MONOCYTES NFR BLD AUTO: 12 % (ref 0–13.4)
NEUTROPHILS # BLD AUTO: 6.59 K/UL (ref 1.82–7.42)
NEUTROPHILS NFR BLD: 80.2 % (ref 44–72)
NITRITE UR QL STRIP.AUTO: NEGATIVE
NRBC # BLD AUTO: 0 K/UL
NRBC BLD-RTO: 0 /100 WBC (ref 0–0.2)
PH UR STRIP.AUTO: 6 [PH] (ref 5–8)
PLATELET # BLD AUTO: 238 K/UL (ref 164–446)
PMV BLD AUTO: 9.6 FL (ref 9–12.9)
POTASSIUM SERPL-SCNC: 4.1 MMOL/L (ref 3.6–5.5)
PROT SERPL-MCNC: 7.2 G/DL (ref 6–8.2)
PROT UR QL STRIP: NEGATIVE MG/DL
RBC # BLD AUTO: 4.62 M/UL (ref 4.2–5.4)
RBC UR QL AUTO: NEGATIVE
RSV RNA SPEC QL NAA+PROBE: NEGATIVE
SARS-COV-2 RNA RESP QL NAA+PROBE: NOTDETECTED
SODIUM SERPL-SCNC: 135 MMOL/L (ref 135–145)
SP GR UR STRIP.AUTO: 1.01
UROBILINOGEN UR STRIP.AUTO-MCNC: 0.2 MG/DL
WBC # BLD AUTO: 8.2 K/UL (ref 4.8–10.8)

## 2024-02-03 PROCEDURE — 81003 URINALYSIS AUTO W/O SCOPE: CPT

## 2024-02-03 PROCEDURE — 84703 CHORIONIC GONADOTROPIN ASSAY: CPT

## 2024-02-03 PROCEDURE — 700117 HCHG RX CONTRAST REV CODE 255: Performed by: EMERGENCY MEDICINE

## 2024-02-03 PROCEDURE — 76705 ECHO EXAM OF ABDOMEN: CPT

## 2024-02-03 PROCEDURE — 85025 COMPLETE CBC W/AUTO DIFF WBC: CPT

## 2024-02-03 PROCEDURE — 80053 COMPREHEN METABOLIC PANEL: CPT

## 2024-02-03 PROCEDURE — 96375 TX/PRO/DX INJ NEW DRUG ADDON: CPT

## 2024-02-03 PROCEDURE — 96374 THER/PROPH/DIAG INJ IV PUSH: CPT

## 2024-02-03 PROCEDURE — 0241U HCHG SARS-COV-2 COVID-19 NFCT DS RESP RNA 4 TRGT ED POC: CPT

## 2024-02-03 PROCEDURE — 36415 COLL VENOUS BLD VENIPUNCTURE: CPT

## 2024-02-03 PROCEDURE — 700111 HCHG RX REV CODE 636 W/ 250 OVERRIDE (IP): Mod: JZ | Performed by: EMERGENCY MEDICINE

## 2024-02-03 PROCEDURE — 83690 ASSAY OF LIPASE: CPT

## 2024-02-03 PROCEDURE — 700105 HCHG RX REV CODE 258: Performed by: EMERGENCY MEDICINE

## 2024-02-03 PROCEDURE — 74177 CT ABD & PELVIS W/CONTRAST: CPT

## 2024-02-03 PROCEDURE — 99285 EMERGENCY DEPT VISIT HI MDM: CPT

## 2024-02-03 RX ORDER — SODIUM CHLORIDE, SODIUM LACTATE, POTASSIUM CHLORIDE, AND CALCIUM CHLORIDE .6; .31; .03; .02 G/100ML; G/100ML; G/100ML; G/100ML
1000 INJECTION, SOLUTION INTRAVENOUS ONCE
Status: COMPLETED | OUTPATIENT
Start: 2024-02-03 | End: 2024-02-03

## 2024-02-03 RX ORDER — ONDANSETRON 4 MG/1
4 TABLET, ORALLY DISINTEGRATING ORAL EVERY 8 HOURS PRN
Qty: 12 TABLET | Refills: 0 | Status: SHIPPED | OUTPATIENT
Start: 2024-02-03 | End: 2024-02-13

## 2024-02-03 RX ORDER — OSELTAMIVIR PHOSPHATE 75 MG/1
75 CAPSULE ORAL 2 TIMES DAILY
Qty: 10 CAPSULE | Refills: 0 | Status: ACTIVE | OUTPATIENT
Start: 2024-02-03 | End: 2024-02-13

## 2024-02-03 RX ORDER — SODIUM CHLORIDE, SODIUM LACTATE, POTASSIUM CHLORIDE, CALCIUM CHLORIDE 600; 310; 30; 20 MG/100ML; MG/100ML; MG/100ML; MG/100ML
INJECTION, SOLUTION INTRAVENOUS ONCE
Status: COMPLETED | OUTPATIENT
Start: 2024-02-03 | End: 2024-02-03

## 2024-02-03 RX ORDER — ONDANSETRON 2 MG/ML
4 INJECTION INTRAMUSCULAR; INTRAVENOUS ONCE
Status: COMPLETED | OUTPATIENT
Start: 2024-02-03 | End: 2024-02-03

## 2024-02-03 RX ORDER — MORPHINE SULFATE 4 MG/ML
4 INJECTION INTRAVENOUS ONCE
Status: COMPLETED | OUTPATIENT
Start: 2024-02-03 | End: 2024-02-03

## 2024-02-03 RX ADMIN — SODIUM CHLORIDE, POTASSIUM CHLORIDE, SODIUM LACTATE AND CALCIUM CHLORIDE: 600; 310; 30; 20 INJECTION, SOLUTION INTRAVENOUS at 13:59

## 2024-02-03 RX ADMIN — IOHEXOL 100 ML: 350 INJECTION, SOLUTION INTRAVENOUS at 14:05

## 2024-02-03 RX ADMIN — MORPHINE SULFATE 4 MG: 4 INJECTION, SOLUTION INTRAMUSCULAR; INTRAVENOUS at 12:43

## 2024-02-03 RX ADMIN — SODIUM CHLORIDE, POTASSIUM CHLORIDE, SODIUM LACTATE AND CALCIUM CHLORIDE 1000 ML: 600; 310; 30; 20 INJECTION, SOLUTION INTRAVENOUS at 10:26

## 2024-02-03 RX ADMIN — ONDANSETRON 4 MG: 2 INJECTION INTRAMUSCULAR; INTRAVENOUS at 10:27

## 2024-02-03 ASSESSMENT — FIBROSIS 4 INDEX: FIB4 SCORE: 0.41

## 2024-02-03 NOTE — ED PROVIDER NOTES
"ED Provider Note    CHIEF COMPLAINT  Chief Complaint   Patient presents with    Cough     X 2days. Pt reports negative covid test this week.    Headache     Secondary to cough x 2 days    Abdominal Pain     Pt reports R sided abdominal pain, accompanied with nausea x 24 hours.  H/O kidney stones and concern for intestinal fissure.  Awaiting CT scan (to be done Tuesday) for diagnostics.       EXTERNAL RECORDS REVIEWED  Outpatient Notes she follows with primary care for Crohn's disease, migraines, and that coccyx pain.  She had an adenoidectomy this past May.  She is follow-up pulmonary medicine with PFTs.    HPI/ROS  LIMITATION TO HISTORY   Select: : None      Magaly Toure is a 38 y.o. female who presents with a chief complaint to me of \"a combination of weirdness.\"  She is referring to feeling poorly.  She has had cough, headache, congestion for a few days.  She has had off-and-on chills.  She also has some pain in her lower back on the right, as well as the right groin and the right upper quadrant.  This been there for a few days as well.  She is never had this before.  She points out that her gallbladder is still there as is her appendix.  She states \"it reminds me of a kidney stone\" but it is \"not as bad.\"  She has some intermittent sweats and is feeling hot.  She is also been having some ongoing tailbone pain since November, such as a fistula.  Her primary doctor is concerned that this may be related to her Crohn's disease.  She is in fact due for a CT scan on Tuesday to evaluate for this.  She has had no change in bowel or bladder.  She is some intermittent nausea.  She generally just does not feel well.    PAST MEDICAL HISTORY   has a past medical history of Anxiety and depression (1/19/2024), Cancer (HCC), Crohn's disease (HCC) (20111), Hip pain, bilateral, IBD (inflammatory bowel disease), Kidney stone (2008 to 2015), Migraines (1998), MRSA infection, Ovarian cyst, PCOS (polycystic ovarian syndrome), " Pneumonia (see CT scan), Recurrent sinusitis (01/14/2020), and Syncope due to orthostatic hypotension.    SURGICAL HISTORY   has a past surgical history that includes dental extraction(s); lumbar transforaminal epidural steroid injection (Right, 05/17/2021); no pertinent past surgical history; and adenoidectomy (N/A, 5/18/2023).    FAMILY HISTORY  Family History   Problem Relation Age of Onset    Breast Cancer Mother 57        BRCA negative    Other Mother         MS, fibroids/hysterectomy    GI Disease Maternal Aunt         Crohn's    Cancer Maternal Grandmother         uterine or fibrioids?, breast, skin, lung    Lung Disease Maternal Grandmother         emphysema    Psychiatric Illness Maternal Grandmother         suicide    GI Disease Maternal Grandfather         Crohn's       SOCIAL HISTORY  Social History     Tobacco Use    Smoking status: Never     Passive exposure: Never    Smokeless tobacco: Never   Vaping Use    Vaping Use: Never used   Substance and Sexual Activity    Alcohol use: Yes     Alcohol/week: 1.2 oz     Types: 2 Shots of liquor per week     Comment: once a week    Drug use: Not Currently     Frequency: 1.0 times per week     Types: Oral     Comment: past hallucinogens, smoking (vape)    Sexual activity: Yes     Partners: Female       CURRENT MEDICATIONS  Home Medications       Reviewed by Nisa Bravo R.N. (Registered Nurse) on 02/03/24 at 0922  Med List Status: Partial     Medication Last Dose Status   Acetaminophen (TYLENOL PO)  Active   Adapalene 0.3 % Gel  Active   albuterol 108 (90 Base) MCG/ACT Aero Soln inhalation aerosol  Active   benzonatate (TESSALON) 100 MG Cap  Active   budesonide-formoterol (SYMBICORT) 160-4.5 MCG/ACT Aerosol  Active   DULoxetine (CYMBALTA) 30 MG Cap DR Particles  Active   IBUPROFEN PO  Active   inFLIXimab (REMICADE) 100 MG Recon Soln  Active   Omega-3 Fatty Acids (FISH OIL) 1000 MG Cap capsule  Active   Probiotic Product (PROBIOTIC-10 PO)  Active  "  spironolactone (ALDACTONE) 50 MG Tab  Active   therapeutic multivitamin-minerals (THERAGRAN-M) Tab  Active   topiramate (TOPAMAX) 25 MG capsule  Active   vitamin D3 (CHOLECALCIFEROL) 1000 Unit (25 mcg) Tab  Active                    ALLERGIES  Allergies   Allergen Reactions    Azathioprine Itching and Rash     Other reaction(s): Accumulated in the liver    Hydrocortisone Sod Succinate      Patient notes that it \"made her pass out\". Use solu-medrol for infusion premedication  Patient notes that it \"made her pass out\". Use solu-medrol for infusion premedication    Sumatriptan Shortness of Breath     Other reaction(s): \"Makes me fall asleep for large amounts of time\"    Infliximab      15min into infusion at 10cc/hr, pt reported chest tightness/ ticklish feeling and cough. Infusion stopped. O2 supplement/Benadryl /albuterol administered. Monitored for about 2hrs and discharged in stable condition. VSS throughout the treatment.     Lidocaine      Nasal spray causes fainting (dental shots were okay)  Other reaction(s): Hypotension    Prochlorperazine      distonic  Other reaction(s): Dystonic    Promethazine      distonic  Other reaction(s): Dystonic    Zolmitriptan      sleepy  sleepy  Other reaction(s): \"Makes me fall asleep for large amounts of time\"    Cedarwood Oil     Grass Extracts [Gramineae Pollens]     Poison Oak Extract [Extract Of Poison [place]]     Pollen Extract        PHYSICAL EXAM  VITAL SIGNS: /62   Pulse 90   Temp 36.6 °C (97.9 °F) (Temporal)   Resp 18   Wt 93.6 kg (206 lb 5.6 oz)   SpO2 94%   BMI 35.42 kg/m²    Constitutional: She appears tired but not toxic.  Obvious upper respiratory congestion.  HENT: Head is without trauma.  Mucous membranes are moist.  Eyes: Sclerae are nonicteric, pupils are equally round.  Neck: Supple with grossly normal range of motion.  Cardiovascular: Triage heart rate is noted, to my count, and on the monitor, it is in the 80s.  I do not hear a murmur.  " Peripheral pulses are intact and symmetric throughout.  Thorax & Lungs: Breathing easily.  Good air movement.  There is no wheeze, rhonchi or rales.  Abdomen: Bowel sounds normal, soft, non-distended.  No masses appreciated.  No apparent hepatosplenomegaly.  She has some tenderness in right upper quadrant with an equivocal clinical Paul sign.  There is no tenderness elsewhere.  There is no rebound or guarding.  No significant CVA tenderness.  Skin: No apparent rash.  I do not see petechiae or purpura.  Extremities: No evidence of acute trauma.  No clubbing, cyanosis, edema, no Homans or cords.  Neurologic: Alert. Moving all extremities.  Intact sensation and strength throughout.  Gait is normal.  Psychiatric: Normal for situation      DIAGNOSTIC STUDIES / PROCEDURES      LABS  Labs Reviewed   CBC WITH DIFFERENTIAL - Abnormal; Notable for the following components:       Result Value    Neutrophils-Polys 80.20 (*)     Lymphocytes 6.80 (*)     Lymphs (Absolute) 0.56 (*)     Monos (Absolute) 0.99 (*)     All other components within normal limits   COMP METABOLIC PANEL - Abnormal; Notable for the following components:    Co2 19 (*)     All other components within normal limits   ESTIMATED GFR - Abnormal; Notable for the following components:    GFR (CKD-EPI) 50 (*)     All other components within normal limits   POC COV-2, FLU A/B, RSV BY PCR - Abnormal; Notable for the following components:    POC Influenza A RNA, PCR POSITIVE (*)     All other components within normal limits   LIPASE   HCG QUAL SERUM   URINALYSIS,CULTURE IF INDICATED    Narrative:     Indication for culture:->Patient WITHOUT an indwelling Obrien  catheter in place with new onset of Dysuria, Frequency,  Urgency, and/or Suprapubic pain         RADIOLOGY  I have independently interpreted the diagnostic imaging associated with this visit and am waiting the final reading from the radiologist.   My preliminary interpretation is as follows: No inflammatory  change  Radiologist interpretation:   CT-ABDOMEN-PELVIS WITH   Final Result      1.  No evidence of bowel obstruction or focal inflammatory change. The appendix is normal.      2.  Small amount of bilateral perinephric fluid. No evidence of hydronephrosis.      3.  Fatty liver.      4.  Uterine fibroids.      5.  Bilateral ovarian follicles.      US-RUQ   Final Result      1.  No evidence of gallstone or evidence of biliary ductal dilatation.      2.  Minimal right perinephric fluid.            COURSE & MEDICAL DECISION MAKING    ED Observation Status? No; Patient does not meet criteria for ED Observation.     INITIAL ASSESSMENT, COURSE AND PLAN  Care Narrative: This is a patient who presents with feeling poorly, cough, congestion, belly pain.  This is in the setting of Crohn's disease, Remicade use, and several months of tailbone pain.  With respect to her symptoms today, laboratories were already obtained by triage.  There is no leukocytosis.  There is a preponderance of neutrophils 80% but there is no bandemia reported by the lab.  Creatinine is in the normal range, although it is elevated from her last value in May of last year.  I do see that her GFR is diminished at 50.  There is no pancreatitis.  No hepatitis.  Basic chemistries are unremarkable.  She is not pregnant.  Her viral panel returns positive for influenza A.  I think a lot of her symptoms are likely related to this.  However as I discussed with her, without belly tenderness, I do worry about secondary etiology as well.  Although there is no pancreatitis or hepatitis, I do want to go ahead and proceed with an ultrasound to evaluate her gallbladder.  Although she points out that her pain is somewhat reminiscent of a kidney stone, there is no blood in the urine, and I do not think that we should pursue testing this with imaging.  Her lower belly is benign and there is no leukocytosis, I do not think that she needs an emergent scan today.  While we work  her up, I have asked nurses to place an IV, give her some IV fluids as she is likely somewhat dehydrated with that diminished GFR.  Will write her for Zofran.      Ultrasound returned showing a normal gallbladder, no gallstones.  Also right perinephric stranding.  I am not sure what to make of that.  With her Crohn's disease and immunosuppression, I do worry about complications of inflammatory bowel disease.  For this reason I have ordered a CT scan.  I updated the patient of this.  In the interim, she is getting IV fluids.  Upon recheck, she just feels cold.    CT scan returns with the above result.  Not sure what to make of that bilateral perinephric fluid.  There is no comment on stranding orders that appear to be inflammatory changes.  Do not see any signs of complications of her inflammatory bowel disease.  Her vital signs are normal after her IV fluids.  At this point, can go ahead and discharge her home.  We had discussion about Tamiflu.  She would like to go ahead and try that I written her for this, she is in the window.  I have also written her for some Zofran for nausea.  I like her to follow-up with her personal doctor she plans to do        ADDITIONAL PROBLEM LIST  Inflammatory bowel disease  DISPOSITION AND DISCUSSIONS    Escalation of care considered, and ultimately not performed:acute inpatient care management, however at this time, the patient is most appropriate for outpatient management      Decision tools and prescription drugs considered including, but not limited to:  Zofran, Tamiflu .    FINAL DIAGNOSIS  1. Influenza    2. Abdominal pain, unspecified abdominal location    3. Crohn's disease without complication, unspecified gastrointestinal tract location (HCC)    4. Perinephric fluid collection           Electronically signed by: Louie Jordan M.D., 2/3/2024 10:27 AM

## 2024-02-03 NOTE — ED TRIAGE NOTES
Chief Complaint   Patient presents with    Cough     X 2days. Pt reports negative covid test this week.    Headache     Secondary to cough x 2 days    Abdominal Pain     Pt reports R sided abdominal pain, accompanied with nausea x 24 hours.  H/O kidney stones and concern for intestinal fissure.  Awaiting CT scan (to be done Tuesday) for diagnostics.     Abdominal pain protocol initiated.  Covid PCR initiated.

## 2024-02-06 ENCOUNTER — TELEMEDICINE (OUTPATIENT)
Dept: MEDICAL GROUP | Facility: IMAGING CENTER | Age: 39
End: 2024-02-06
Payer: COMMERCIAL

## 2024-02-06 ENCOUNTER — APPOINTMENT (OUTPATIENT)
Dept: RADIOLOGY | Facility: MEDICAL CENTER | Age: 39
End: 2024-02-06
Attending: PHYSICIAN ASSISTANT
Payer: COMMERCIAL

## 2024-02-06 VITALS — WEIGHT: 206 LBS | BODY MASS INDEX: 35.17 KG/M2 | HEIGHT: 64 IN

## 2024-02-06 DIAGNOSIS — Z13.0 SCREENING FOR DEFICIENCY ANEMIA: ICD-10-CM

## 2024-02-06 DIAGNOSIS — J10.1 INFLUENZA A: ICD-10-CM

## 2024-02-06 DIAGNOSIS — Z13.1 DIABETES MELLITUS SCREENING: ICD-10-CM

## 2024-02-06 DIAGNOSIS — Z13.21 ENCOUNTER FOR VITAMIN DEFICIENCY SCREENING: ICD-10-CM

## 2024-02-06 DIAGNOSIS — F32.A ANXIETY AND DEPRESSION: ICD-10-CM

## 2024-02-06 DIAGNOSIS — M53.3 COCCYX PAIN: ICD-10-CM

## 2024-02-06 DIAGNOSIS — F41.9 ANXIETY AND DEPRESSION: ICD-10-CM

## 2024-02-06 DIAGNOSIS — Z13.29 SCREENING FOR THYROID DISORDER: ICD-10-CM

## 2024-02-06 DIAGNOSIS — Z13.220 SCREENING CHOLESTEROL LEVEL: ICD-10-CM

## 2024-02-06 PROCEDURE — 99214 OFFICE O/P EST MOD 30 MIN: CPT | Performed by: PHYSICIAN ASSISTANT

## 2024-02-06 RX ORDER — DULOXETIN HYDROCHLORIDE 30 MG/1
30 CAPSULE, DELAYED RELEASE ORAL DAILY
Qty: 90 CAPSULE | Refills: 0 | Status: SHIPPED | OUTPATIENT
Start: 2024-02-06

## 2024-02-06 ASSESSMENT — PAIN SCALES - GENERAL: PAINLEVEL: NO PAIN

## 2024-02-06 ASSESSMENT — FIBROSIS 4 INDEX: FIB4 SCORE: 0.6

## 2024-02-06 NOTE — ASSESSMENT & PLAN NOTE
Patient went to the ER on 2/3/2024 due to abdominal pain, headache.  She originally thought that she had a kidney stone, but was ultimately diagnosed with influenza A.  Now on Tamiflu with improvement of her symptoms.  CT abdomen pelvis without acute process.

## 2024-02-06 NOTE — PROGRESS NOTES
"Virtual Visit: Established Patient   This visit was conducted via Zoom using secure and encrypted videoconferencing technology. The patient was in a private location in the state of [place].    The patient's identity was confirmed and verbal consent was obtained for this virtual visit.    Subjective:   CC:   Chief Complaint   Patient presents with    Follow-Up     ER visit        Magaly Toure is a 38 y.o. female presenting for evaluation and management of:    Anxiety and depression  Patient notes improvement in her anxiety depression with the addition of duloxetine.  She states that she is less irritable and reactive.  Does not feel as anxious about stressful situations anymore.    Coccyx pain  Unchanged, CT imaging negative.  Pain started after prolonged sitting several months ago.    Influenza A  Patient went to the ER on 2/3/2024 due to abdominal pain, headache.  She originally thought that she had a kidney stone, but was ultimately diagnosed with influenza A.  Now on Tamiflu with improvement of her symptoms.  CT abdomen pelvis without acute process.    ROS   Denies any recent fevers or chills. No nausea or vomiting. No chest pains or shortness of breath.     Allergies   Allergen Reactions    Azathioprine Itching and Rash     Other reaction(s): Accumulated in the liver    Hydrocortisone Sod Succinate      Patient notes that it \"made her pass out\". Use solu-medrol for infusion premedication  Patient notes that it \"made her pass out\". Use solu-medrol for infusion premedication    Sumatriptan Shortness of Breath     Other reaction(s): \"Makes me fall asleep for large amounts of time\"    Infliximab      15min into infusion at 10cc/hr, pt reported chest tightness/ ticklish feeling and cough. Infusion stopped. O2 supplement/Benadryl /albuterol administered. Monitored for about 2hrs and discharged in stable condition. VSS throughout the treatment.     Lidocaine      Nasal spray causes fainting (dental shots were " "okay)  Other reaction(s): Hypotension    Prochlorperazine      distonic  Other reaction(s): Dystonic    Promethazine      distonic  Other reaction(s): Dystonic    Zolmitriptan      sleepy  sleepy  Other reaction(s): \"Makes me fall asleep for large amounts of time\"    Cedarwood Oil     Grass Extracts [Gramineae Pollens]     Poison Oak Extract [Extract Of Poison [place]]     Pollen Extract        Current medicines (including changes today)  Current Outpatient Medications   Medication Sig Dispense Refill    DULoxetine (CYMBALTA) 30 MG Cap DR Particles Take 1 Capsule by mouth every day. 90 Capsule 0    oseltamivir (TAMIFLU) 75 MG Cap Take 1 Capsule by mouth 2 times a day. 10 Capsule 0    ondansetron (ZOFRAN ODT) 4 MG TABLET DISPERSIBLE Take 1 Tablet by mouth every 8 hours as needed for Nausea/Vomiting. 12 Tablet 0    spironolactone (ALDACTONE) 50 MG Tab TAKE 1 TABLET BY MOUTH EVERY DAY 90 Tablet 3    topiramate (TOPAMAX) 25 MG capsule Take 1 Capsule by mouth 2 times a day. 180 Capsule 0    albuterol 108 (90 Base) MCG/ACT Aero Soln inhalation aerosol Inhale 2 Puffs every four hours as needed for Shortness of Breath. 1 Each 0    vitamin D3 (CHOLECALCIFEROL) 1000 Unit (25 mcg) Tab Take 2,000 Units by mouth every day.      Acetaminophen (TYLENOL PO) Take  by mouth.      IBUPROFEN PO Take  by mouth as needed.      inFLIXimab (REMICADE) 100 MG Recon Soln Infuse  into a venous catheter. Every 8 weeks.      Adapalene 0.3 % Gel       Omega-3 Fatty Acids (FISH OIL) 1000 MG Cap capsule Take 1,000 mg by mouth every day.      therapeutic multivitamin-minerals (THERAGRAN-M) Tab Take 1 Tab by mouth every day.      Probiotic Product (PROBIOTIC-10 PO) Take  by mouth.      budesonide-formoterol (SYMBICORT) 160-4.5 MCG/ACT Aerosol Inhale 2 Puffs 2 times a day. Use spacer. Rinse mouth after each use. (Patient not taking: Reported on 2/6/2024) 1 Each 6    benzonatate (TESSALON) 100 MG Cap Take 1 Capsule by mouth at bedtime as needed for Cough. " (Patient not taking: Reported on 2/6/2024) 30 Capsule 0     No current facility-administered medications for this visit.       Patient Active Problem List    Diagnosis Date Noted    Influenza A 02/06/2024    Coccyx pain 01/19/2024    Anxiety and depression 01/19/2024    PCOS (polycystic ovarian syndrome) 10/13/2022    History of ovarian cyst 10/03/2022    Obesity (BMI 30-39.9) 10/03/2022    Irregular menses 10/03/2022    Crohn's disease of small intestine without complications (HCC) 10/06/2020    Fibroid 01/28/2020    Kidney stones 01/14/2020    Intractable migraine with aura without status migrainosus 06/17/2019       Family History   Problem Relation Age of Onset    Breast Cancer Mother 57        BRCA negative    Other Mother         MS, fibroids/hysterectomy    GI Disease Maternal Aunt         Crohn's    Cancer Maternal Grandmother         uterine or fibrioids?, breast, skin, lung    Lung Disease Maternal Grandmother         emphysema    Psychiatric Illness Maternal Grandmother         suicide    GI Disease Maternal Grandfather         Crohn's       She  has a past medical history of Anxiety and depression (1/19/2024), Cancer ([facility]), Crohn's disease (HCC) (20111), Hip pain, bilateral, IBD (inflammatory bowel disease), Kidney stone (2008 to 2015), Migraines (1998), MRSA infection, Ovarian cyst, PCOS (polycystic ovarian syndrome), Pneumonia (see CT scan), Recurrent sinusitis (01/14/2020), and Syncope due to orthostatic hypotension.    She has no past medical history of Addisons disease ([facility]), Adrenal disorder ([facility]), Allergy, Anemia, Arrhythmia, Arthritis, Asthma, Blood transfusion without reported diagnosis, Cataract, CHF (congestive heart failure) ([facility]), Clotting disorder (HCC), COPD (chronic obstructive pulmonary disease) ([facility]), Cushings syndrome ([facility]), Diabetes (HCC), Diabetic neuropathy (HCC), GERD (gastroesophageal reflux disease), Glaucoma, Goiter, Head ache, Heart attack (HCC), Heart murmur, HIV (human  "immunodeficiency virus infection) (HCC), Hyperlipidemia, Muscle disorder, Osteoporosis, Parathyroid disorder (HCC), Pituitary disease (HCC), Pregnant, Pulmonary emphysema (HCC), Seizure (HCC), Sickle cell disease (HCC), Stroke (HCC), Substance abuse (HCC), Thyroid disease, Tuberculosis, or Urinary tract infection.  She  has a past surgical history that includes dental extraction(s); lumbar transforaminal epidural steroid injection (Right, 05/17/2021); no pertinent past surgical history; and adenoidectomy (N/A, 5/18/2023).         Objective:   Ht 1.626 m (5' 4\")   Wt 93.4 kg (206 lb)   LMP  (LMP Unknown)   BMI 35.36 kg/m²   RR 12    Physical Exam:  Constitutional: Alert, no distress, well-groomed.  Skin: No rashes in visible areas.  Eye: Round. Conjunctiva clear, lids normal. No icterus.   ENMT: Lips pink without lesions, good dentition, moist mucous membranes. Phonation normal.  Neck: No masses, no thyromegaly. Moves freely without pain.  Respiratory: Unlabored respiratory effort, no cough or audible wheeze  Psych: Alert and oriented x3, normal affect and mood.     Assessment and Plan:   The following treatment plan was discussed:     1. Anxiety and depression  Chronic, controlled and stable. Continue current regimen -   - DULoxetine (CYMBALTA) 30 MG Cap DR Particles; Take 1 Capsule by mouth every day.  Dispense: 90 Capsule; Refill: 0    2. Influenza A  - Use a humidifier, especially at night. Cold or warm water humidifiers have the same effect.  - Nicola Med squeeze bottle sinus rinses or plain nasal saline twice a day  - Hot tea + honey + fresh lemon juice  - Honey by itself has been shown to help provide cough relief  - Frequent hand washing, rest, hydration  - Side effects of medications reviewed  - Seek medical treatment if symptoms persist or worsen    3. Coccyx pain  Recommend acupuncture versus orthopedic care.  Patient opting for acupuncture.    4. Screening for deficiency anemia  - CBC WITH DIFFERENTIAL; " Future  - VITAMIN B12; Future  - IRON/TOTAL IRON BIND; Future  - FERRITIN; Future    5. Diabetes mellitus screening  - Comp Metabolic Panel; Future    6. Encounter for vitamin deficiency screening  - VITAMIN D,25 HYDROXY (DEFICIENCY); Future  - VITAMIN B12; Future    7. Screening cholesterol level  - Lipid Profile; Future    8. Screening for thyroid disorder  - TSH WITH REFLEX TO FT4; Future      Follow-up: Return in about 4 weeks (around 3/5/2024) for Annual physical.         Lena Helm) Edis DE PAZ  Physician Assistant Certified  [facility]    Please note that this dictation was created using voice recognition software. I have made every reasonable attempt to correct obvious errors, but I expect that there are errors of grammar and possibly content that I did not discover before finalizing the note.

## 2024-02-06 NOTE — PATIENT INSTRUCTIONS
It was a pleasure meeting with you today at [facility]!    Your medical history/records and medications were reviewed today.     UPDATE on MyChart Results: If you have blood work, and/or imaging studies, or any other test or procedure completed, you will have access to results as soon as they become available in MyChart. Recently, these results will be available for review at the same time that your provider is able to see results!    This will likely mean you will see a result before your provider has had a chance to review and discuss with you.  Some results or care notes may be hard to understand and may be serious in nature.    We look at every result and your provider will contact you to explain what they mean and discuss appropriate next steps. Please allow for at least 72 business hours for chart and result review.     We prefer that you wait for your care team to contact you with your results.  Often, your provider will discuss your results with you at your next appointment. We look forward to continuing to partner with you in your care.    Please review my practice information below:    If you have any prescription refill requests, please send them via [employer] or discuss with your provider at the start of your office visits. Please allow 3-5 business days for lab and testing review and you will be contacted via [employer] with those results, or if advised to make a follow up appointment regarding those results, then please do so.     Once resulted, your lab/test/imaging results will show up automatically in your MyChart. Please wait for my interpretation and recommendations prior to viewing your results to avoid any unnecessary confusion or misinterpretation. I will address all of the lab values that I interpret as abnormal and message you accordingly on your MyChart. I will always send you a message about your results even if they are normal. If you do not hear back from me within 5-7 business  days after completing your tests, then please send me a message on [employer] so I can obtain your results (especially if you went to an outside lab or imaging center - [facility], [facility], etc).     If you have any additional questions or concerns beyond my interpretation of your results, please make an appointment with me to discuss in further detail.    Please only use the [employer] messaging system for questions regarding your most recent appointment or if advised to use otherwise (glucose or blood pressure reporting).     If you have any new problems or concerns, you must make an appointment to discuss. This includes any referral requests, lab requests (unless advised to notify me for pre-appt labs), medication side effects, or request for medication adjustments.     Please arrive 15 minutes prior to your appointment time to complete your check-in and intake with the medical assistant.      Thank you,    Lena Randhawa PA-C ([place])  Physician Assistant Certified  [facility]    -----------------------------------------------------------------    Attn: Patients of [facility]:    In an effort to continue to provide excellent and efficient care to our patients, it is vital that we continue to use our resources appropriately. With that, this is a reminder that [employer] is used for prescription refill requests, test results, virtual visits, and chart review only.     Any new questions, concerns/conditions, lab/imaging requests, medication adjustments, new prescriptions, or referral requests do require an appointment (virtually or in person), unless discussed otherwise at your most recent appointment.     Thank you for your understanding,    [facility]

## 2024-02-06 NOTE — ASSESSMENT & PLAN NOTE
Patient notes improvement in her anxiety depression with the addition of duloxetine.  She states that she is less irritable and reactive.  Does not feel as anxious about stressful situations anymore.

## 2024-02-08 ENCOUNTER — APPOINTMENT (RX ONLY)
Dept: URBAN - METROPOLITAN AREA CLINIC 4 | Facility: CLINIC | Age: 39
Setting detail: DERMATOLOGY
End: 2024-02-08

## 2024-02-08 DIAGNOSIS — L81.4 OTHER MELANIN HYPERPIGMENTATION: ICD-10-CM

## 2024-02-08 DIAGNOSIS — L72.8 OTHER FOLLICULAR CYSTS OF THE SKIN AND SUBCUTANEOUS TISSUE: ICD-10-CM | Status: STABLE

## 2024-02-08 DIAGNOSIS — L82.1 OTHER SEBORRHEIC KERATOSIS: ICD-10-CM

## 2024-02-08 DIAGNOSIS — L70.0 ACNE VULGARIS: ICD-10-CM

## 2024-02-08 DIAGNOSIS — Z71.89 OTHER SPECIFIED COUNSELING: ICD-10-CM

## 2024-02-08 DIAGNOSIS — D22 MELANOCYTIC NEVI: ICD-10-CM

## 2024-02-08 DIAGNOSIS — D18.0 HEMANGIOMA: ICD-10-CM

## 2024-02-08 PROBLEM — D18.01 HEMANGIOMA OF SKIN AND SUBCUTANEOUS TISSUE: Status: ACTIVE | Noted: 2024-02-08

## 2024-02-08 PROBLEM — D22.5 MELANOCYTIC NEVI OF TRUNK: Status: ACTIVE | Noted: 2024-02-08

## 2024-02-08 PROCEDURE — 99203 OFFICE O/P NEW LOW 30 MIN: CPT

## 2024-02-08 PROCEDURE — ? SUNSCREEN RECOMMENDATIONS

## 2024-02-08 PROCEDURE — ? COUNSELING

## 2024-02-08 PROCEDURE — ? PRESCRIPTION

## 2024-02-08 RX ORDER — ADAPALENE 3 MG/G
GEL TOPICAL
Qty: 45 | Refills: 3 | Status: ERX | COMMUNITY
Start: 2024-02-08

## 2024-02-08 RX ADMIN — ADAPALENE: 3 GEL TOPICAL at 00:00

## 2024-02-08 ASSESSMENT — LOCATION ZONE DERM: LOCATION ZONE: TRUNK

## 2024-02-08 ASSESSMENT — LOCATION DETAILED DESCRIPTION DERM
LOCATION DETAILED: LEFT SUPERIOR UPPER BACK
LOCATION DETAILED: RIGHT MEDIAL SUPERIOR CHEST

## 2024-02-08 ASSESSMENT — SEVERITY ASSESSMENT OVERALL AMONG ALL PATIENTS
IN YOUR EXPERIENCE, AMONG ALL PATIENTS YOU HAVE SEEN WITH THIS CONDITION, HOW SEVERE IS THIS PATIENT'S CONDITION?: NORMAL

## 2024-02-08 ASSESSMENT — LOCATION SIMPLE DESCRIPTION DERM
LOCATION SIMPLE: CHEST
LOCATION SIMPLE: LEFT UPPER BACK

## 2024-02-08 NOTE — PROCEDURE: COUNSELING
Sunscreen Recommendations: SPF 30 or greater.
Detail Level: Generalized
Detail Level: Detailed
Sarecycline Pregnancy And Lactation Text: This medication is Pregnancy Category D and not consider safe during pregnancy. It is also excreted in breast milk.
Isotretinoin Counseling: Patient should get monthly blood tests, not donate blood, not drive at night if vision affected, not share medication, and not undergo elective surgery for 6 months after tx completed. Side effects reviewed, pt to contact office should one occur.
Tazorac Pregnancy And Lactation Text: This medication is not safe during pregnancy. It is unknown if this medication is excreted in breast milk.
Birth Control Pills Pregnancy And Lactation Text: This medication should be avoided if pregnant and for the first 30 days post-partum.
Azelaic Acid Counseling: Patient counseled that medicine may cause skin irritation and to avoid applying near the eyes.  In the event of skin irritation, the patient was advised to reduce the amount of the drug applied or use it less frequently.   The patient verbalized understanding of the proper use and possible adverse effects of azelaic acid.  All of the patient's questions and concerns were addressed.
Winlevi Counseling:  I discussed with the patient the risks of topical clascoterone including but not limited to erythema, scaling, itching, and stinging. Patient voiced their understanding.
Include Pregnancy/Lactation Warning?: No
Bactrim Pregnancy And Lactation Text: This medication is Pregnancy Category D and is known to cause fetal risk.  It is also excreted in breast milk.
Topical Retinoid Pregnancy And Lactation Text: This medication is Pregnancy Category C. It is unknown if this medication is excreted in breast milk.
Aklief counseling:  Patient advised to apply a pea-sized amount only at bedtime and wait 30 minutes after washing their face before applying.  If too drying, patient may add a non-comedogenic moisturizer.  The most commonly reported side effects including irritation, redness, scaling, dryness, stinging, burning, itching, and increased risk of sunburn.  The patient verbalized understanding of the proper use and possible adverse effects of retinoids.  All of the patient's questions and concerns were addressed.
Erythromycin Counseling:  I discussed with the patient the risks of erythromycin including but not limited to GI upset, allergic reaction, drug rash, diarrhea, increase in liver enzymes, and yeast infections.
Benzoyl Peroxide Pregnancy And Lactation Text: This medication is Pregnancy Category C. It is unknown if benzoyl peroxide is excreted in breast milk.
Azithromycin Pregnancy And Lactation Text: This medication is considered safe during pregnancy and is also secreted in breast milk.
Topical Sulfur Applications Counseling: Topical Sulfur Counseling: Patient counseled that this medication may cause skin irritation or allergic reactions.  In the event of skin irritation, the patient was advised to reduce the amount of the drug applied or use it less frequently.   The patient verbalized understanding of the proper use and possible adverse effects of topical sulfur application.  All of the patient's questions and concerns were addressed.
Doxycycline Counseling:  Patient counseled regarding possible photosensitivity and increased risk for sunburn.  Patient instructed to avoid sunlight, if possible.  When exposed to sunlight, patients should wear protective clothing, sunglasses, and sunscreen.  The patient was instructed to call the office immediately if the following severe adverse effects occur:  hearing changes, easy bruising/bleeding, severe headache, or vision changes.  The patient verbalized understanding of the proper use and possible adverse effects of doxycycline.  All of the patient's questions and concerns were addressed.
High Dose Vitamin A Pregnancy And Lactation Text: High dose vitamin A therapy is contraindicated during pregnancy and breast feeding.
Tetracycline Counseling: Patient counseled regarding possible photosensitivity and increased risk for sunburn.  Patient instructed to avoid sunlight, if possible.  When exposed to sunlight, patients should wear protective clothing, sunglasses, and sunscreen.  The patient was instructed to call the office immediately if the following severe adverse effects occur:  hearing changes, easy bruising/bleeding, severe headache, or vision changes.  The patient verbalized understanding of the proper use and possible adverse effects of tetracycline.  All of the patient's questions and concerns were addressed. Patient understands to avoid pregnancy while on therapy due to potential birth defects.
Dapsone Counseling: I discussed with the patient the risks of dapsone including but not limited to hemolytic anemia, agranulocytosis, rashes, methemoglobinemia, kidney failure, peripheral neuropathy, headaches, GI upset, and liver toxicity.  Patients who start dapsone require monitoring including baseline LFTs and weekly CBCs for the first month, then every month thereafter.  The patient verbalized understanding of the proper use and possible adverse effects of dapsone.  All of the patient's questions and concerns were addressed.
Azelaic Acid Pregnancy And Lactation Text: This medication is considered safe during pregnancy and breast feeding.
Topical Clindamycin Counseling: Patient counseled that this medication may cause skin irritation or allergic reactions.  In the event of skin irritation, the patient was advised to reduce the amount of the drug applied or use it less frequently.   The patient verbalized understanding of the proper use and possible adverse effects of clindamycin.  All of the patient's questions and concerns were addressed.
Isotretinoin Pregnancy And Lactation Text: This medication is Pregnancy Category X and is considered extremely dangerous during pregnancy. It is unknown if it is excreted in breast milk.
Spironolactone Counseling: Patient advised regarding risks of diarrhea, abdominal pain, hyperkalemia, birth defects (for female patients), liver toxicity and renal toxicity. The patient may need blood work to monitor liver and kidney function and potassium levels while on therapy. The patient verbalized understanding of the proper use and possible adverse effects of spironolactone.  All of the patient's questions and concerns were addressed.
Winlevi Pregnancy And Lactation Text: This medication is considered safe during pregnancy and breastfeeding.
Aklief Pregnancy And Lactation Text: It is unknown if this medication is safe to use during pregnancy.  It is unknown if this medication is excreted in breast milk.  Breastfeeding women should use the topical cream on the smallest area of the skin for the shortest time needed while breastfeeding.  Do not apply to nipple and areola.
Sarecycline Counseling: Patient advised regarding possible photosensitivity and discoloration of the teeth, skin, lips, tongue and gums.  Patient instructed to avoid sunlight, if possible.  When exposed to sunlight, patients should wear protective clothing, sunglasses, and sunscreen.  The patient was instructed to call the office immediately if the following severe adverse effects occur:  hearing changes, easy bruising/bleeding, severe headache, or vision changes.  The patient verbalized understanding of the proper use and possible adverse effects of sarecycline.  All of the patient's questions and concerns were addressed.
Tazorac Counseling:  Patient advised that medication is irritating and drying.  Patient may need to apply sparingly and wash off after an hour before eventually leaving it on overnight.  The patient verbalized understanding of the proper use and possible adverse effects of tazorac.  All of the patient's questions and concerns were addressed.
Erythromycin Pregnancy And Lactation Text: This medication is Pregnancy Category B and is considered safe during pregnancy. It is also excreted in breast milk.
Topical Sulfur Applications Pregnancy And Lactation Text: This medication is Pregnancy Category C and has an unknown safety profile during pregnancy. It is unknown if this topical medication is excreted in breast milk.
Topical Retinoid counseling:  Patient advised to apply a pea-sized amount only at bedtime and wait 30 minutes after washing their face before applying.  If too drying, patient may add a non-comedogenic moisturizer. The patient verbalized understanding of the proper use and possible adverse effects of retinoids.  All of the patient's questions and concerns were addressed.
Birth Control Pills Counseling: Birth Control Pill Counseling: I discussed with the patient the potential side effects of OCPs including but not limited to increased risk of stroke, heart attack, thrombophlebitis, deep venous thrombosis, hepatic adenomas, breast changes, GI upset, headaches, and depression.  The patient verbalized understanding of the proper use and possible adverse effects of OCPs. All of the patient's questions and concerns were addressed.
Minocycline Counseling: Patient advised regarding possible photosensitivity and discoloration of the teeth, skin, lips, tongue and gums.  Patient instructed to avoid sunlight, if possible.  When exposed to sunlight, patients should wear protective clothing, sunglasses, and sunscreen.  The patient was instructed to call the office immediately if the following severe adverse effects occur:  hearing changes, easy bruising/bleeding, severe headache, or vision changes.  The patient verbalized understanding of the proper use and possible adverse effects of minocycline.  All of the patient's questions and concerns were addressed.
Bactrim Counseling:  I discussed with the patient the risks of sulfa antibiotics including but not limited to GI upset, allergic reaction, drug rash, diarrhea, dizziness, photosensitivity, and yeast infections.  Rarely, more serious reactions can occur including but not limited to aplastic anemia, agranulocytosis, methemoglobinemia, blood dyscrasias, liver or kidney failure, lung infiltrates or desquamative/blistering drug rashes.
Doxycycline Pregnancy And Lactation Text: This medication is Pregnancy Category D and not consider safe during pregnancy. It is also excreted in breast milk but is considered safe for shorter treatment courses.
Topical Clindamycin Pregnancy And Lactation Text: This medication is Pregnancy Category B and is considered safe during pregnancy. It is unknown if it is excreted in breast milk.
Benzoyl Peroxide Counseling: Patient counseled that medicine may cause skin irritation and bleach clothing.  In the event of skin irritation, the patient was advised to reduce the amount of the drug applied or use it less frequently.   The patient verbalized understanding of the proper use and possible adverse effects of benzoyl peroxide.  All of the patient's questions and concerns were addressed.
High Dose Vitamin A Counseling: Side effects reviewed, pt to contact office should one occur.
Spironolactone Pregnancy And Lactation Text: This medication can cause feminization of the male fetus and should be avoided during pregnancy. The active metabolite is also found in breast milk.
Azithromycin Counseling:  I discussed with the patient the risks of azithromycin including but not limited to GI upset, allergic reaction, drug rash, diarrhea, and yeast infections.
Dapsone Pregnancy And Lactation Text: This medication is Pregnancy Category C and is not considered safe during pregnancy or breast feeding.

## 2024-02-13 ENCOUNTER — TELEMEDICINE (OUTPATIENT)
Dept: MEDICAL GROUP | Facility: MEDICAL CENTER | Age: 39
End: 2024-02-13
Payer: COMMERCIAL

## 2024-02-13 VITALS — HEIGHT: 64 IN | WEIGHT: 200 LBS | BODY MASS INDEX: 34.15 KG/M2 | RESPIRATION RATE: 14 BRPM

## 2024-02-13 DIAGNOSIS — U07.1 COVID-19: ICD-10-CM

## 2024-02-13 DIAGNOSIS — R94.4 DECREASED GFR: ICD-10-CM

## 2024-02-13 PROCEDURE — 99214 OFFICE O/P EST MOD 30 MIN: CPT | Mod: 95 | Performed by: STUDENT IN AN ORGANIZED HEALTH CARE EDUCATION/TRAINING PROGRAM

## 2024-02-13 RX ORDER — ONDANSETRON 4 MG/1
4 TABLET, FILM COATED ORAL EVERY 4 HOURS PRN
Qty: 20 TABLET | Refills: 0 | Status: SHIPPED | OUTPATIENT
Start: 2024-02-13

## 2024-02-13 ASSESSMENT — FIBROSIS 4 INDEX: FIB4 SCORE: 0.6

## 2024-02-13 NOTE — PROGRESS NOTES
Virtual Visit: Established Patient   This visit was conducted via Zoom using secure and encrypted videoconferencing technology.   The patient was in their home in the [facility].    The patient's identity was confirmed and verbal consent was obtained for this virtual visit.    Subjective:   CC:   Chief Complaint   Patient presents with    Coronavirus Screening     Pt tested this morning      Magaly Toure is a 38 y.o. female presenting for evaluation and management of:    COVID-19.  She initially had the flu and was sick for about 2 weeks.  She then started feel better until this morning when she had worsening of cough and loss of taste and smell.  She took a COVID test that was positive.  She is immune compromised secondary to taking Remicade for Crohn's disease.  She also recently had labs done that showed a GFR of 50    Current medicines (including changes today)  Current Outpatient Medications   Medication Sig Dispense Refill    ondansetron (ZOFRAN) 4 MG Tab tablet Take 1 Tablet by mouth every four hours as needed for Nausea/Vomiting. 20 Tablet 0    Nirmatrelvir&Ritonavir 150/100 10 x 150 MG & 10 x 100MG Tablet Therapy Pack Take 150 mg nirmatrelvir (one 150 mg tablet) with 100 mg ritonavir (one 100 mg tablet) by mouth, with both tablets taken together twice daily for 5 days. 20 Each 0    DULoxetine (CYMBALTA) 30 MG Cap DR Particles Take 1 Capsule by mouth every day. 90 Capsule 0    spironolactone (ALDACTONE) 50 MG Tab TAKE 1 TABLET BY MOUTH EVERY DAY 90 Tablet 3    topiramate (TOPAMAX) 25 MG capsule Take 1 Capsule by mouth 2 times a day. 180 Capsule 0    budesonide-formoterol (SYMBICORT) 160-4.5 MCG/ACT Aerosol Inhale 2 Puffs 2 times a day. Use spacer. Rinse mouth after each use. 1 Each 6    vitamin D3 (CHOLECALCIFEROL) 1000 Unit (25 mcg) Tab Take 2,000 Units by mouth every day.      inFLIXimab (REMICADE) 100 MG Recon Soln Infuse  into a venous catheter. Every 8 weeks.      Adapalene 0.3 % Gel        "Omega-3 Fatty Acids (FISH OIL) 1000 MG Cap capsule Take 1,000 mg by mouth every day.      therapeutic multivitamin-minerals (THERAGRAN-M) Tab Take 1 Tab by mouth every day.      Probiotic Product (PROBIOTIC-10 PO) Take  by mouth.       No current facility-administered medications for this visit.       Patient Active Problem List    Diagnosis Date Noted    Influenza A 02/06/2024    Coccyx pain 01/19/2024    Anxiety and depression 01/19/2024    PCOS (polycystic ovarian syndrome) 10/13/2022    History of ovarian cyst 10/03/2022    Obesity (BMI 30-39.9) 10/03/2022    Irregular menses 10/03/2022    Crohn's disease of small intestine without complications (HCC) 10/06/2020    Fibroid 01/28/2020    Kidney stones 01/14/2020    Intractable migraine with aura without status migrainosus 06/17/2019        Objective:   Ht 1.626 m (5' 4\")   Wt 90.7 kg (200 lb)   LMP  (LMP Unknown)   BMI 34.33 kg/m²     Physical Exam:  Constitutional: Alert, no distress, well-groomed.  Skin: No rashes in visible areas.  Eye: Round. Conjunctiva clear, lids normal. No icterus.   ENMT: Lips pink without lesions, good dentition, moist mucous membranes. Phonation normal.  Neck: No masses, no thyromegaly. Moves freely without pain.  Respiratory: Unlabored respiratory effort, no cough or audible wheeze  Psych: Alert and oriented x3, normal affect and mood.     Assessment and Plan:   The following treatment plan was discussed:     1. COVID-19  Discussed the risk and benefits of taking Paxlovid, renally dose due to decreased GFR, Zofran for nausea.  Discussed that if she has any shortness of breath, chest pain or lightheadedness or dizziness to go to the emergency department  - ondansetron (ZOFRAN) 4 MG Tab tablet; Take 1 Tablet by mouth every four hours as needed for Nausea/Vomiting.  Dispense: 20 Tablet; Refill: 0  - Nirmatrelvir&Ritonavir 150/100 10 x 150 MG & 10 x 100MG Tablet Therapy Pack; Take 150 mg nirmatrelvir (one 150 mg tablet) with 100 mg " ritonavir (one 100 mg tablet) by mouth, with both tablets taken together twice daily for 5 days.  Dispense: 20 Each; Refill: 0    2. Decreased GFR  Decreased on recent labs, renally dosed Paxlovid    Please note that this dictation was created using voice recognition software. I have made every reasonable attempt to correct obvious errors, but I expect that there are errors of grammar and possibly content that I did not discover before finalizing the note.      Follow-up: No follow-ups on file.

## 2024-03-05 NOTE — ED NOTES
Interval History:     No significant events overnight, no new complaints this morning. Post-op #3 Lisfranc amputation.    Review of Systems   Constitutional: Negative.  Negative for chills and fever.   HENT: Negative.     Respiratory:  Negative for cough, chest tightness and shortness of breath.    Cardiovascular:  Negative for chest pain and palpitations.   Gastrointestinal:  Negative for abdominal pain, diarrhea, nausea and vomiting.   Endocrine: Negative.    Genitourinary: Negative.    Musculoskeletal:  Negative for arthralgias and myalgias.   Skin:  Negative for rash.   Allergic/Immunologic: Negative.    Neurological:  Negative for dizziness, syncope, weakness, numbness and headaches.   Psychiatric/Behavioral:  Negative for confusion and sleep disturbance.      Objective:     Vital Signs (Most Recent):  Temp: 97.7 °F (36.5 °C) (03/05/24 0636)  Pulse: 61 (03/05/24 0752)  Resp: 20 (03/05/24 0922)  BP: 138/62 (03/05/24 0636)  SpO2: (!) 93 % (03/05/24 0752) Vital Signs (24h Range):  Temp:  [97.5 °F (36.4 °C)-98 °F (36.7 °C)] 97.7 °F (36.5 °C)  Pulse:  [57-68] 61  Resp:  [12-20] 20  SpO2:  [89 %-99 %] 93 %  BP: (110-138)/(55-67) 138/62     Weight: 73.5 kg (162 lb)  Body mass index is 24.63 kg/m².    Intake/Output Summary (Last 24 hours) at 3/5/2024 1001  Last data filed at 3/5/2024 0927  Gross per 24 hour   Intake 360 ml   Output 400 ml   Net -40 ml           Physical Exam  Constitutional:       General: He is not in acute distress.     Appearance: Normal appearance. He is not ill-appearing.   HENT:      Head: Normocephalic and atraumatic.   Cardiovascular:      Rate and Rhythm: Normal rate and regular rhythm.   Pulmonary:      Effort: Pulmonary effort is normal. No respiratory distress.   Musculoskeletal:      Cervical back: No rigidity.      Comments: R LE surgical dressing clean, dry, intact   Skin:     Coloration: Skin is not jaundiced or pale.      Findings: No rash.   Neurological:      Mental Status: He is  Patient ambulatory to restroom   alert. Mental status is at baseline.   Psychiatric:         Behavior: Behavior normal.         Thought Content: Thought content normal.             Significant Labs: All pertinent labs within the past 24 hours have been reviewed.    Significant Imaging: I have reviewed all pertinent imaging results/findings within the past 24 hours.

## 2024-03-22 ENCOUNTER — HOSPITAL ENCOUNTER (OUTPATIENT)
Dept: LAB | Facility: MEDICAL CENTER | Age: 39
End: 2024-03-22
Attending: NURSE PRACTITIONER
Payer: COMMERCIAL

## 2024-03-22 LAB
ALBUMIN SERPL BCP-MCNC: 4.6 G/DL (ref 3.2–4.9)
ALBUMIN/GLOB SERPL: 1.5 G/DL
ALP SERPL-CCNC: 69 U/L (ref 30–99)
ALT SERPL-CCNC: 28 U/L (ref 2–50)
ANION GAP SERPL CALC-SCNC: 12 MMOL/L (ref 7–16)
AST SERPL-CCNC: 27 U/L (ref 12–45)
BASOPHILS # BLD AUTO: 0.9 % (ref 0–1.8)
BASOPHILS # BLD: 0.06 K/UL (ref 0–0.12)
BILIRUB SERPL-MCNC: 0.4 MG/DL (ref 0.1–1.5)
BUN SERPL-MCNC: 13 MG/DL (ref 8–22)
CALCIUM ALBUM COR SERPL-MCNC: 8.5 MG/DL (ref 8.5–10.5)
CALCIUM SERPL-MCNC: 9 MG/DL (ref 8.5–10.5)
CHLORIDE SERPL-SCNC: 106 MMOL/L (ref 96–112)
CO2 SERPL-SCNC: 20 MMOL/L (ref 20–33)
CREAT SERPL-MCNC: 0.91 MG/DL (ref 0.5–1.4)
EOSINOPHIL # BLD AUTO: 0.16 K/UL (ref 0–0.51)
EOSINOPHIL NFR BLD: 2.3 % (ref 0–6.9)
ERYTHROCYTE [DISTWIDTH] IN BLOOD BY AUTOMATED COUNT: 41.5 FL (ref 35.9–50)
GFR SERPLBLD CREATININE-BSD FMLA CKD-EPI: 84 ML/MIN/1.73 M 2
GLOBULIN SER CALC-MCNC: 3 G/DL (ref 1.9–3.5)
GLUCOSE SERPL-MCNC: 93 MG/DL (ref 65–99)
HBV CORE AB SERPL QL IA: NONREACTIVE
HBV SURFACE AB SERPL IA-ACNC: 93.3 MIU/ML (ref 0–10)
HBV SURFACE AG SER QL: NORMAL
HCT VFR BLD AUTO: 44.6 % (ref 37–47)
HGB BLD-MCNC: 15.4 G/DL (ref 12–16)
IMM GRANULOCYTES # BLD AUTO: 0.02 K/UL (ref 0–0.11)
IMM GRANULOCYTES NFR BLD AUTO: 0.3 % (ref 0–0.9)
LACTOFERRIN STL QL IA: NEGATIVE
LYMPHOCYTES # BLD AUTO: 2.03 K/UL (ref 1–4.8)
LYMPHOCYTES NFR BLD: 28.9 % (ref 22–41)
MCH RBC QN AUTO: 31.5 PG (ref 27–33)
MCHC RBC AUTO-ENTMCNC: 34.5 G/DL (ref 32.2–35.5)
MCV RBC AUTO: 91.2 FL (ref 81.4–97.8)
MONOCYTES # BLD AUTO: 0.4 K/UL (ref 0–0.85)
MONOCYTES NFR BLD AUTO: 5.7 % (ref 0–13.4)
NEUTROPHILS # BLD AUTO: 4.36 K/UL (ref 1.82–7.42)
NEUTROPHILS NFR BLD: 61.9 % (ref 44–72)
NRBC # BLD AUTO: 0 K/UL
NRBC BLD-RTO: 0 /100 WBC (ref 0–0.2)
PLATELET # BLD AUTO: 351 K/UL (ref 164–446)
PMV BLD AUTO: 10.2 FL (ref 9–12.9)
POTASSIUM SERPL-SCNC: 4 MMOL/L (ref 3.6–5.5)
PROT SERPL-MCNC: 7.6 G/DL (ref 6–8.2)
RBC # BLD AUTO: 4.89 M/UL (ref 4.2–5.4)
SODIUM SERPL-SCNC: 138 MMOL/L (ref 135–145)
WBC # BLD AUTO: 7 K/UL (ref 4.8–10.8)

## 2024-03-22 PROCEDURE — 80053 COMPREHEN METABOLIC PANEL: CPT

## 2024-03-22 PROCEDURE — 83630 LACTOFERRIN FECAL (QUAL): CPT

## 2024-03-22 PROCEDURE — 87340 HEPATITIS B SURFACE AG IA: CPT

## 2024-03-22 PROCEDURE — 36415 COLL VENOUS BLD VENIPUNCTURE: CPT

## 2024-03-22 PROCEDURE — 83520 IMMUNOASSAY QUANT NOS NONAB: CPT

## 2024-03-22 PROCEDURE — 86480 TB TEST CELL IMMUN MEASURE: CPT

## 2024-03-22 PROCEDURE — 86704 HEP B CORE ANTIBODY TOTAL: CPT

## 2024-03-22 PROCEDURE — 85025 COMPLETE CBC W/AUTO DIFF WBC: CPT

## 2024-03-22 PROCEDURE — 86706 HEP B SURFACE ANTIBODY: CPT

## 2024-03-22 PROCEDURE — 83993 ASSAY FOR CALPROTECTIN FECAL: CPT

## 2024-03-22 PROCEDURE — 80230 DRUG ASSAY INFLIXIMAB: CPT

## 2024-03-25 LAB
GAMMA INTERFERON BACKGROUND BLD IA-ACNC: 0.03 IU/ML
M TB IFN-G BLD-IMP: NEGATIVE
M TB IFN-G CD4+ BCKGRND COR BLD-ACNC: 0.04 IU/ML
MITOGEN IGNF BCKGRD COR BLD-ACNC: >10 IU/ML
QFT TB2 - NIL TBQ2: 0.02 IU/ML

## 2024-03-26 LAB — CALPROTECTIN STL-MCNT: 18 UG/G

## 2024-03-27 LAB
INFLIXIMAB AB SERPL IA-MCNC: <20 NG/ML
INFLIXIMAB SERPL IA-MCNC: 9.9 UG/ML

## 2024-04-21 DIAGNOSIS — G43.119 INTRACTABLE MIGRAINE WITH AURA WITHOUT STATUS MIGRAINOSUS: ICD-10-CM

## 2024-04-21 DIAGNOSIS — F41.9 ANXIETY AND DEPRESSION: ICD-10-CM

## 2024-04-21 DIAGNOSIS — F32.A ANXIETY AND DEPRESSION: ICD-10-CM

## 2024-04-22 RX ORDER — DULOXETIN HYDROCHLORIDE 30 MG/1
30 CAPSULE, DELAYED RELEASE ORAL DAILY
Qty: 90 CAPSULE | Refills: 1 | Status: SHIPPED | OUTPATIENT
Start: 2024-04-22

## 2024-04-22 RX ORDER — TOPIRAMATE SPINKLE 25 MG/1
25 CAPSULE ORAL 2 TIMES DAILY
Qty: 180 CAPSULE | Refills: 1 | Status: SHIPPED | OUTPATIENT
Start: 2024-04-22

## 2024-04-22 NOTE — TELEPHONE ENCOUNTER
Received request via: Patient    Was the patient seen in the last year in this department? Yes    Does the patient have an active prescription (recently filled or refills available) for medication(s) requested? No    Pharmacy Name: [facility] #[id number]    Does the patient have half-way Plus and need 100 day supply (blood pressure, diabetes and cholesterol meds only)? Patient does not have SCP

## 2024-04-24 ENCOUNTER — OFFICE VISIT (OUTPATIENT)
Dept: BEHAVIORAL HEALTH | Facility: CLINIC | Age: 39
End: 2024-04-24
Payer: COMMERCIAL

## 2024-04-24 VITALS — HEIGHT: 64 IN | BODY MASS INDEX: 34.08 KG/M2 | WEIGHT: 199.6 LBS

## 2024-04-24 DIAGNOSIS — F41.1 GENERALIZED ANXIETY DISORDER: ICD-10-CM

## 2024-04-24 DIAGNOSIS — F33.41 MAJOR DEPRESSIVE DISORDER, RECURRENT, IN PARTIAL REMISSION (HCC): ICD-10-CM

## 2024-04-24 PROBLEM — F32.A ANXIETY AND DEPRESSION: Status: RESOLVED | Noted: 2024-01-19 | Resolved: 2024-04-24

## 2024-04-24 PROBLEM — F41.9 ANXIETY AND DEPRESSION: Status: RESOLVED | Noted: 2024-01-19 | Resolved: 2024-04-24

## 2024-04-24 PROBLEM — J10.1 INFLUENZA A: Status: RESOLVED | Noted: 2024-02-06 | Resolved: 2024-04-24

## 2024-04-24 RX ORDER — DROSPIRENONE 4 MG/1
1 TABLET, FILM COATED ORAL
COMMUNITY
Start: 2024-04-01

## 2024-04-24 ASSESSMENT — ANXIETY QUESTIONNAIRES
GAD7 TOTAL SCORE: 4
6. BECOMING EASILY ANNOYED OR IRRITABLE: SEVERAL DAYS
1. FEELING NERVOUS, ANXIOUS, OR ON EDGE: SEVERAL DAYS
3. WORRYING TOO MUCH ABOUT DIFFERENT THINGS: SEVERAL DAYS
4. TROUBLE RELAXING: SEVERAL DAYS
IF YOU CHECKED OFF ANY PROBLEMS ON THIS QUESTIONNAIRE, HOW DIFFICULT HAVE THESE PROBLEMS MADE IT FOR YOU TO DO YOUR WORK, TAKE CARE OF THINGS AT HOME, OR GET ALONG WITH OTHER PEOPLE: NOT DIFFICULT AT ALL
5. BEING SO RESTLESS THAT IT IS HARD TO SIT STILL: NOT AT ALL
7. FEELING AFRAID AS IF SOMETHING AWFUL MIGHT HAPPEN: NOT AT ALL
2. NOT BEING ABLE TO STOP OR CONTROL WORRYING: NOT AT ALL

## 2024-04-24 ASSESSMENT — PATIENT HEALTH QUESTIONNAIRE - PHQ9
SUM OF ALL RESPONSES TO PHQ QUESTIONS 1-9: 3
8. MOVING OR SPEAKING SO SLOWLY THAT OTHER PEOPLE COULD HAVE NOTICED. OR THE OPPOSITE, BEING SO FIGETY OR RESTLESS THAT YOU HAVE BEEN MOVING AROUND A LOT MORE THAN USUAL: NOT AT ALL
3. TROUBLE FALLING OR STAYING ASLEEP OR SLEEPING TOO MUCH: SEVERAL DAYS
6. FEELING BAD ABOUT YOURSELF - OR THAT YOU ARE A FAILURE OR HAVE LET YOURSELF OR YOUR FAMILY DOWN: NOT AL ALL
5. POOR APPETITE OR OVEREATING: 1 - SEVERAL DAYS
7. TROUBLE CONCENTRATING ON THINGS, SUCH AS READING THE NEWSPAPER OR WATCHING TELEVISION: NOT AT ALL
CLINICAL INTERPRETATION OF PHQ2 SCORE: 0
4. FEELING TIRED OR HAVING LITTLE ENERGY: SEVERAL DAYS
SUM OF ALL RESPONSES TO PHQ9 QUESTIONS 1 AND 2: 0
2. FEELING DOWN, DEPRESSED, IRRITABLE, OR HOPELESS: NOT AT ALL
9. THOUGHTS THAT YOU WOULD BE BETTER OFF DEAD, OR OF HURTING YOURSELF: NOT AT ALL
1. LITTLE INTEREST OR PLEASURE IN DOING THINGS: NOT AT ALL
5. POOR APPETITE OR OVEREATING: SEVERAL DAYS

## 2024-04-24 ASSESSMENT — FIBROSIS 4 INDEX: FIB4 SCORE: 0.52

## 2024-04-24 NOTE — PROGRESS NOTES
INITIAL PSYCHIATRY EVALUATION    Chief Complaint   Patient presents with    Depression    Anxiety     History Of Present Illness:  Magaly Toure is a 38 y.o. female with history of anxiety disorder, depressive disorder, PCOS, Crohn's disease, migraines referred by Lena Randhawa P.A.-C  for evaluation of anxiety and depression.  She has had on and off struggles with anxiety and depression since her teenage years but for most of her life she has been able to manage her symptoms with journaling, yoga, exercise and psychotherapy.  She had several health problems in the last few years which exacerbated her underlying anxiety and depression.  She mentioned that she was started on combined estrogen and progesterone oral contraceptive pill at the age of 17 after she had a ruptured ovarian cyst.  Her gynecologist abruptly stopped the birth control in 2021 as it could have been contributing to her migraines.  Her wife started noticing decline in symptoms after this change happened.  She stopped leaving her house and started shutting down and challenging situations which was unlike her.  She started seeing a therapist after this.  She was diagnosed with PCOS in 2022 and was started on spironolactone.  She had pneumonia and adenoidectomy around the same time which was challenging.  She did talk to her therapist who recommended medications as her symptoms were worsening.  She was having crying spells, was avoiding conflict, running low on patients and not engaging in hobbies that she really enjoyed.  Her wife noticed that she was not keeping up with household chores which was unlike her.  She was also avoiding taking showers during her struggles.  She did notice struggles with energy during this time.  She saw her PCP in January and was started on Cymbalta which has been helpful.  She is having more patience and has started engaging in her hobbies.  She mentions that she recognizes her anxiety and her wife recognizes her  depression more.  She recalls that whenever she is anxious she starts spinning in circles and has racing thoughts.  She has a difficult time articulating whenever she is anxious.  She does notice that she is worrying a lot when she is feeling anxious.  She has noticed some side effects from Cymbalta especially some mild sexual dysfunction and sleep disruption.  She has been waking up at 230 every morning and is up for 30 to 60 minutes before she can get back to sleep.  She does feel refreshed when she wakes up in the morning but towards the middle of the day feels like taking a nap.  She and her wife recently found out that they will not be able to continue IVF process which they started several months ago which has been hard on both of them.  She has good support from her wife who is a .  She denies symptoms consistent with bipolar mood disorder or psychotic disorder.  She reports some traumatic events including losing her maternal grandmother to suicide at the age of 21 by self-inflicted gunshot wound and a difficult break-up in 2014.  Her ex-girlfriend overdosed during [employer] while she was working and she did on her during this relationship.  She does feel that she has been able to move on from that relationship.  She denies having thoughts of wanting to hurt herself.    Current psychiatric medications - Cymbalta 30 mg daily (x 3 months)    Past Psychiatric History:  She denies history of suicide attempt or prior inpatient psychiatry hospitalization.  She reports being diagnosed with ADHD when she was 6 years old but was never prescribed medications.  Previous medication trials - None       Current Safety/Relapse Assessment:       Suicidal: Low       Homicidal: Low       Self-Harm: Low       Relapse: Not applicable       Crisis Safety Plan: Not Indicated    Past Medical/Surgical History:  Past Medical History:   Diagnosis Date    Anxiety and depression 1/19/2024    Cancer (HCC)     Crohn's  "disease ([facility])     Hip pain, bilateral     IBD (inflammatory bowel disease)     Kidney stone  to 2015    4x (mostly on right side).     Migraines     MRSA infection     sinus infection    Ovarian cyst     age 18, burst    PCOS (polycystic ovarian syndrome)     Takes spironolactone (ALDACTONE) 50 mg daily.    Pneumonia see CT scan    Recurrent sinusitis 2020    -Patient prefers antibiotic treatment at this time.  We have had an antibiotic stewardship conversation.  Clindamycin is the next step given that she failed Augmentin and has confirmed MRSA on nasal swab.  However she has Crohn's disease and not comfortable prescribing this due to increased risk of C. difficile infection.  We will now try Bactrim and doxycycline for 10 days. -Patient aware of most    Syncope due to orthostatic hypotension      Past Surgical History:   Procedure Laterality Date    ADENOIDECTOMY N/A 2023    Procedure: ADENOIDECTOMY;  Surgeon: Harsh Shin M.D.;  Location: SURGERY SAME DAY [facility];  Service: Ent    LUMBAR TRANSFORAMINAL EPIDURAL STEROID INJECTION Right 2021    Procedure: RIGHT L4-5 transforaminal epidural steroid injection with sedation;  Surgeon: Godfrey Vigil M.D.;  Location: [facility]ERY [facility]NAG[facility];  Service: Pain Management    DENTAL EXTRACTION(S)       Family Psychiatric History:  Maternal uncle - agoraphobia  Maternal grandmother () - suicide attempt x 3, addiction, committed suicide by self inflicted gun shot   Mother - \"angry and depressed\"  Maternal aunts and uncle - cocaine, benzodiazepine and alcohol addiction     Substance Use/Addiction History:  Alcohol - Infrequent, once every 2 weeks, denies episodes of daily drinking or heavy drinking  Nicotine - Denies  Cannabis - Infrequent, edibles once every 3 months to manage GI pain during Crohn's diease exacerbation  Illicit drugs - She experimented with mushrooms and acid in the past    Social History:  She is , 8 " "yo son (wife's biological child), Master's degree in Chemistry, head of ticketing at [employer], lives with family in [place], mother lives in [place], no relationship with father.  Her parents  when she was 4 years old after her father was sentenced to halfway.  She has not had any contact with him since then.  She has a good relationship with her mother.      Allergies:  Azathioprine, Hydrocortisone sod succinate, Sumatriptan, Infliximab, Lidocaine, Prochlorperazine, Promethazine, Zolmitriptan, Cedarwood oil, Grass extracts [gramineae pollens], Poison oak extract [extract of poison oak], and Pollen extract    Medications:  Current Outpatient Medications   Medication Sig Dispense Refill    SLYND 4 MG Tab Take 1 Tablet by mouth every day.      topiramate (TOPAMAX) 25 MG capsule Take 1 Capsule by mouth 2 times a day. 180 Capsule 1    DULoxetine (CYMBALTA) 30 MG Cap DR Particles Take 1 Capsule by mouth every day. 90 Capsule 1    budesonide-formoterol (SYMBICORT) 160-4.5 MCG/ACT Aerosol Inhale 2 Puffs 2 times a day. Use spacer. Rinse mouth after each use. 1 Each 6    vitamin D3 (CHOLECALCIFEROL) 1000 Unit (25 mcg) Tab Take 2,000 Units by mouth every day.      inFLIXimab (REMICADE) 100 MG Recon Soln Infuse  into a venous catheter. Every 8 weeks.      Adapalene 0.3 % Gel       Omega-3 Fatty Acids (FISH OIL) 1000 MG Cap capsule Take 1,000 mg by mouth every day.      therapeutic multivitamin-minerals (THERAGRAN-M) Tab Take 1 Tab by mouth every day.      Probiotic Product (PROBIOTIC-10 PO) Take  by mouth.       No current facility-administered medications for this visit.     Review of Symptoms:  Constitutional - Positive for fatigue  Psychiatric - Positive for anxiety    Physical Examination:  Vital signs: Ht 1.626 m (5' 4\")   Wt 90.5 kg (199 lb 9.6 oz)   LMP 04/10/2024 (Exact Date)   BMI 34.26 kg/m²     Musculoskeletal: Normal gait. No abnormal movements.     Mental Status Evaluation:   General: Young female, " "dressed in casual attire, good grooming and hygiene, in no apparent distress, calm and cooperative, good eye contact, no psychomotor agitation or retardation  Orientation: Alert and oriented to person, place and time  Recent and remote memory: Intact  Attention span and concentration: Intact  Speech: Spontaneous, normal rate, rhythm and tone  Thought Process: Linear, logical and goal directed  Thought Content: Denies suicidal or homicidal ideations, intent or plan  Perception: Denies auditory or visual hallucinations. No delusions noted  Associations: Intact  Language: Appropriate  Fund of knowledge and vocabulary: Adequate  Mood: \"good\"  Affect: Anxious at times, mood congruent  Insight: Good  Judgment: Good    Depression screenin/17/2023     9:20 AM 2024     8:00 AM 2024     2:00 PM   Depression Screen (PHQ-2/PHQ-9)   PHQ-2 Total Score 0 0 0     Interpretation of PHQ-9 Total Score   Score Severity   1-4 No Depression   5-9 Mild Depression   10-14 Moderate Depression   15-19 Moderately Severe Depression   20-27 Severe Depression    Anxiety screenin/24/2024     2:05 PM   SANJUANA 7   SANJUANA-7 Total Score 4     Interpretation of SANJUANA-7 Total Score   Score Severity:  0-4 No Anxiety   5-9 Mild Anxiety  10-14 Moderate Anxiety  15-21 Severe Anxiety    Medical Records/Labs/Diagnostic Tests Reviewed:  [place] PDMP records - no prescription controlled medications in the last 2 years    Impression:  Young female with ongoing struggles with anxiety and depression since she was a teenager and symptoms exacerbated in the last couple of years.  She saw her PCP in January and was started on Cymbalta and that has been beneficial for her symptoms.    1.  Generalized anxiety disorder - improving  2.  Major depressive disorder, recurrent, in partial remission - improving    Plan:  1.  Continue Cymbalta 30 mg daily for depression and anxiety  2.  Referral placed to start individual psychotherapy    Return to clinic in 3 " months or sooner if symptoms worsen    The proposed treatment plan was discussed with the patient who was provided the opportunity to ask questions and make suggestions regarding alternative treatment. Patient verbalized understanding and expressed agreement with the plan.     Total time spent on the day of encounter: 65 minutes.  Reviewing chart, talking to patient, history-taking, discussing diagnoses and treatment plan, chart completion.    Thank you for allowing me to participate in the care of this patient.    Lolis Zhang M.D.  04/24/24    CC:   Lena Randhawa P.A.-C.    This note was created using voice recognition software (Dragon). The accuracy of the dictation is limited by the abilities of the software. I have reviewed the note prior to signing, however some errors in grammar and context are still possible. If you have any questions related to this note please do not hesitate to contact our office.

## 2024-04-26 ENCOUNTER — APPOINTMENT (OUTPATIENT)
Dept: MEDICAL GROUP | Facility: IMAGING CENTER | Age: 39
End: 2024-04-26
Payer: COMMERCIAL

## 2024-04-26 VITALS
TEMPERATURE: 97.7 F | RESPIRATION RATE: 15 BRPM | OXYGEN SATURATION: 96 % | HEIGHT: 64 IN | WEIGHT: 197.8 LBS | HEART RATE: 75 BPM | DIASTOLIC BLOOD PRESSURE: 76 MMHG | BODY MASS INDEX: 33.77 KG/M2 | SYSTOLIC BLOOD PRESSURE: 110 MMHG

## 2024-04-26 DIAGNOSIS — M54.50 CHRONIC BILATERAL LOW BACK PAIN WITHOUT SCIATICA: ICD-10-CM

## 2024-04-26 DIAGNOSIS — K50.00 CROHN'S DISEASE OF SMALL INTESTINE WITHOUT COMPLICATIONS (HCC): ICD-10-CM

## 2024-04-26 DIAGNOSIS — F41.1 GENERALIZED ANXIETY DISORDER: ICD-10-CM

## 2024-04-26 DIAGNOSIS — G89.29 CHRONIC BILATERAL LOW BACK PAIN WITHOUT SCIATICA: ICD-10-CM

## 2024-04-26 DIAGNOSIS — E28.2 PCOS (POLYCYSTIC OVARIAN SYNDROME): ICD-10-CM

## 2024-04-26 DIAGNOSIS — Z00.00 WELLNESS EXAMINATION: ICD-10-CM

## 2024-04-26 DIAGNOSIS — M53.3 COCCYX PAIN: ICD-10-CM

## 2024-04-26 DIAGNOSIS — G43.119 INTRACTABLE MIGRAINE WITH AURA WITHOUT STATUS MIGRAINOSUS: ICD-10-CM

## 2024-04-26 DIAGNOSIS — F33.42 RECURRENT MAJOR DEPRESSIVE DISORDER, IN FULL REMISSION (HCC): ICD-10-CM

## 2024-04-26 DIAGNOSIS — Z11.4 SCREENING FOR HIV (HUMAN IMMUNODEFICIENCY VIRUS): ICD-10-CM

## 2024-04-26 PROBLEM — N20.0 KIDNEY STONES: Status: RESOLVED | Noted: 2020-01-14 | Resolved: 2024-04-26

## 2024-04-26 PROCEDURE — 1126F AMNT PAIN NOTED NONE PRSNT: CPT | Performed by: PHYSICIAN ASSISTANT

## 2024-04-26 PROCEDURE — 99395 PREV VISIT EST AGE 18-39: CPT | Performed by: PHYSICIAN ASSISTANT

## 2024-04-26 PROCEDURE — 3074F SYST BP LT 130 MM HG: CPT | Performed by: PHYSICIAN ASSISTANT

## 2024-04-26 PROCEDURE — 3078F DIAST BP <80 MM HG: CPT | Performed by: PHYSICIAN ASSISTANT

## 2024-04-26 ASSESSMENT — FIBROSIS 4 INDEX: FIB4 SCORE: 0.52

## 2024-04-26 ASSESSMENT — PAIN SCALES - GENERAL: PAINLEVEL: NO PAIN

## 2024-04-26 NOTE — ASSESSMENT & PLAN NOTE
Chronic, controlled and stable.  Recently established with a psychiatrist.  Doing well on Cymbalta.

## 2024-04-26 NOTE — ASSESSMENT & PLAN NOTE
Patient continues to struggle with coccyx pain for the past 6 months.  Pain only happens with certain movements such as bending over, but can be very severe.  She had CT abdomen pelvis imaging which came back negative.  He sometimes notices pain along the left lower back, as well.  Has not tried massage.

## 2024-04-26 NOTE — PATIENT INSTRUCTIONS
It was a pleasure meeting with you today at [facility]!    Your medical history/records and medications were reviewed today.     UPDATE on MyChart Results: If you have blood work, and/or imaging studies, or any other test or procedure completed, you will have access to results as soon as they become available in MyChart. Recently, these results will be available for review at the same time that your provider is able to see results!    This will likely mean you will see a result before your provider has had a chance to review and discuss with you.  Some results or care notes may be hard to understand and may be serious in nature.    We look at every result and your provider will contact you to explain what they mean and discuss appropriate next steps. Please allow for at least 72 business hours for chart and result review.     We prefer that you wait for your care team to contact you with your results.  Often, your provider will discuss your results with you at your next appointment. We look forward to continuing to partner with you in your care.    Please review my practice information below:    If you have any prescription refill requests, please send them via [employer] or discuss with your provider at the start of your office visits. Please allow 3-5 business days for lab and testing review and you will be contacted via [employer] with those results, or if advised to make a follow up appointment regarding those results, then please do so.     Once resulted, your lab/test/imaging results will show up automatically in your MyChart. Please wait for my interpretation and recommendations prior to viewing your results to avoid any unnecessary confusion or misinterpretation. I will address all of the lab values that I interpret as abnormal and message you accordingly on your MyChart. I will always send you a message about your results even if they are normal. If you do not hear back from me within 5-7 business  days after completing your tests, then please send me a message on [employer] so I can obtain your results (especially if you went to an outside lab or imaging center - [facility], [facility], etc).     If you have any additional questions or concerns beyond my interpretation of your results, please make an appointment with me to discuss in further detail.    Please only use the [employer] messaging system for questions regarding your most recent appointment or if advised to use otherwise (glucose or blood pressure reporting).     If you have any new problems or concerns, you must make an appointment to discuss. This includes any referral requests, lab requests (unless advised to notify me for pre-appt labs), medication side effects, or request for medication adjustments.     Please arrive 15 minutes prior to your appointment time to complete your check-in and intake with the medical assistant.      Thank you,    Lena Randhawa PA-C ([place])  Physician Assistant Certified  [facility]    -----------------------------------------------------------------    Attn: Patients of [facility]:    In an effort to continue to provide excellent and efficient care to our patients, it is vital that we continue to use our resources appropriately. With that, this is a reminder that [employer] is used for prescription refill requests, test results, virtual visits, and chart review only.     Any new questions, concerns/conditions, lab/imaging requests, medication adjustments, new prescriptions, or referral requests do require an appointment (virtually or in person), unless discussed otherwise at your most recent appointment.     Thank you for your understanding,    [facility]

## 2024-05-06 ENCOUNTER — APPOINTMENT (OUTPATIENT)
Dept: RADIOLOGY | Facility: MEDICAL CENTER | Age: 39
End: 2024-05-06
Attending: OBSTETRICS & GYNECOLOGY
Payer: COMMERCIAL

## 2024-05-06 DIAGNOSIS — D25.9 UTERINE LEIOMYOMA, UNSPECIFIED LOCATION: ICD-10-CM

## 2024-05-06 DIAGNOSIS — N85.8 DECIDUOMATOSIS: ICD-10-CM

## 2024-05-13 ENCOUNTER — OFFICE VISIT (OUTPATIENT)
Dept: MEDICAL GROUP | Facility: IMAGING CENTER | Age: 39
End: 2024-05-13
Payer: COMMERCIAL

## 2024-05-13 VITALS
HEIGHT: 64 IN | WEIGHT: 202.4 LBS | TEMPERATURE: 97 F | BODY MASS INDEX: 34.56 KG/M2 | RESPIRATION RATE: 16 BRPM | SYSTOLIC BLOOD PRESSURE: 120 MMHG | OXYGEN SATURATION: 97 % | HEART RATE: 82 BPM | DIASTOLIC BLOOD PRESSURE: 82 MMHG

## 2024-05-13 DIAGNOSIS — H92.01 RIGHT EAR PAIN: ICD-10-CM

## 2024-05-13 DIAGNOSIS — H92.21 OTORRHAGIA OF RIGHT EAR: ICD-10-CM

## 2024-05-13 PROCEDURE — 99213 OFFICE O/P EST LOW 20 MIN: CPT | Performed by: STUDENT IN AN ORGANIZED HEALTH CARE EDUCATION/TRAINING PROGRAM

## 2024-05-13 PROCEDURE — 3074F SYST BP LT 130 MM HG: CPT | Performed by: STUDENT IN AN ORGANIZED HEALTH CARE EDUCATION/TRAINING PROGRAM

## 2024-05-13 PROCEDURE — 3079F DIAST BP 80-89 MM HG: CPT | Performed by: STUDENT IN AN ORGANIZED HEALTH CARE EDUCATION/TRAINING PROGRAM

## 2024-05-13 ASSESSMENT — FIBROSIS 4 INDEX: FIB4 SCORE: 0.52

## 2024-05-13 NOTE — PROGRESS NOTES
Subjective:     CC:   Chief Complaint   Patient presents with    Earache     Ear infection - pain in right ear and jaw, ear won't pop, bleeding started 2 days ago       HPI:   vidhya Meza, 38 y.o., female,  presents today to discuss:     Verbal consent was acquired by the patient to use [employer] ambient listening note generation during this visit Yes        History of Present Illness  The patient presents for evaluation of right ear pain and bleeding.    The patient, a patient of Lena Randhawa PA-C, began experiencing  right ear pain approximately 2 weeks ago following a cold. Following a road trip to [place], she noticed a blockage in her ear, which has not popped since. She describes a sensation of congestion and a sensation of it needing to pop. She observed tan-brown discharge from her ear yesterday, which could potentially contain blood, but today, she noticed blood on her pillow. This morning, she noticed approximately 5 droplets of blood on her pillow. She continues to experience a stuffy nose, which is typical for her seasonal allergies. She uses Nasacort for her allergies and has not taken any oral medications. She quantifies her pain as 4 on a scale of 1 to 10, which she finds bothersome. She has a history of kidney stones, but her current pain is less severe than kidney stone pain. She has experienced some chills, but has not had a fever since she started taking Remicade for Crohn's disease in 2011. She denies any recent ear injuries. She has a history of ear infections during her childhood, but has not had an ear infection since. She reports decreased hearing in the right ear.   She is not allergic to any antibiotics.     ROS:  See HPI    Medications, allergies, past medical history, family history, surgical history, and social history documented in chart and reviewed by me.       Objective:   Exam:  /82 (BP Location: Left arm, Patient Position: Sitting, BP Cuff Size: Adult)  "  Pulse 82   Temp 36.1 °C (97 °F) (Temporal)   Resp 16   Ht 1.626 m (5' 4\")   Wt 91.8 kg (202 lb 6.4 oz)   LMP 04/10/2024 (Exact Date)   SpO2 97%   BMI 34.74 kg/m²      Physical Exam  Vitals reviewed.   Constitutional:       General: She is not in acute distress.     Appearance: Normal appearance. She is not ill-appearing or toxic-appearing.   HENT:      Head: Normocephalic and atraumatic.      Right Ear: Tympanic membrane and external ear normal. No swelling. No mastoid tenderness. Tympanic membrane is not perforated or erythematous.      Left Ear: Ear canal and external ear normal. No swelling. No mastoid tenderness. Tympanic membrane is not perforated or erythematous.      Ears:      Comments: Bleeding present in R auditory canal. R TM intact.      Nose: No rhinorrhea.      Mouth/Throat:      Mouth: Mucous membranes are moist.      Pharynx: Oropharynx is clear. No oropharyngeal exudate or posterior oropharyngeal erythema.   Eyes:      General: No scleral icterus.        Right eye: No discharge.         Left eye: No discharge.      Conjunctiva/sclera: Conjunctivae normal.      Pupils: Pupils are equal, round, and reactive to light.   Neck:      Thyroid: No thyroid mass or thyromegaly.   Cardiovascular:      Rate and Rhythm: Normal rate and regular rhythm.      Pulses: Normal pulses.      Heart sounds: Normal heart sounds. No murmur heard.  Pulmonary:      Effort: Pulmonary effort is normal. No respiratory distress.      Breath sounds: Normal breath sounds. No wheezing.   Abdominal:      General: Bowel sounds are normal. There is no distension.      Palpations: Abdomen is soft. There is no mass.      Tenderness: There is no abdominal tenderness.   Musculoskeletal:         General: Normal range of motion.      Cervical back: Normal range of motion and neck supple. No tenderness.   Lymphadenopathy:      Cervical: No cervical adenopathy.   Skin:     General: Skin is warm and dry.      Coloration: Skin is not " jaundiced.   Neurological:      General: No focal deficit present.      Mental Status: She is alert and oriented to person, place, and time.      Gait: Gait normal.   Psychiatric:         Mood and Affect: Mood normal.         Behavior: Behavior normal.         Thought Content: Thought content normal.         Judgment: Judgment normal.              Assessment & Plan:       Assessment & Plan  1. Right ear pain  2. Otorrhagia of right ear  - Referral to ENT   Acute. The etiology of the bleeding remains uncertain, Tympanic membrane is intact. A stat referral to an Ear, Nose, and Throat (ENT) specialist will be initiated. The patient has been instructed to contact us if she does not receive any communication regarding the ENT referral by the end of today or early tomorrow. In the interim, should her symptoms worsen, such as excessive bleeding, ear pain, swelling, fever, or chills, she is advised to seek immediate medical attention at the emergency room.  Pt agreed with treatment plan and verbalized understanding.     Return if symptoms worsen or fail to improve.     Please note that this dictation was created using voice recognition software. I have made every reasonable attempt to correct obvious errors, but I expect that there are errors of grammar and possibly content that I did not discover before finalizing the note.    Donna Clark PA-C  [facility]

## 2024-05-13 NOTE — PATIENT INSTRUCTIONS
Thank you for choosing [facility]. It was a pleasure meeting you today.     Take care!  Donna [facility]ata[facility]

## 2024-05-14 ENCOUNTER — OFFICE VISIT (OUTPATIENT)
Dept: URGENT CARE | Facility: CLINIC | Age: 39
End: 2024-05-14
Payer: COMMERCIAL

## 2024-05-14 VITALS
HEART RATE: 91 BPM | TEMPERATURE: 97.6 F | DIASTOLIC BLOOD PRESSURE: 78 MMHG | WEIGHT: 201 LBS | RESPIRATION RATE: 16 BRPM | BODY MASS INDEX: 34.31 KG/M2 | SYSTOLIC BLOOD PRESSURE: 118 MMHG | HEIGHT: 64 IN | OXYGEN SATURATION: 94 %

## 2024-05-14 DIAGNOSIS — H60.501 ACUTE OTITIS EXTERNA OF RIGHT EAR, UNSPECIFIED TYPE: ICD-10-CM

## 2024-05-14 DIAGNOSIS — H65.91 RIGHT NON-SUPPURATIVE OTITIS MEDIA: ICD-10-CM

## 2024-05-14 PROCEDURE — 3078F DIAST BP <80 MM HG: CPT

## 2024-05-14 PROCEDURE — 99213 OFFICE O/P EST LOW 20 MIN: CPT

## 2024-05-14 PROCEDURE — 3074F SYST BP LT 130 MM HG: CPT

## 2024-05-14 RX ORDER — OFLOXACIN 3 MG/ML
10 SOLUTION AURICULAR (OTIC) DAILY
Qty: 14 ML | Refills: 0 | Status: SHIPPED | OUTPATIENT
Start: 2024-05-14 | End: 2024-05-21

## 2024-05-14 RX ORDER — AMOXICILLIN AND CLAVULANATE POTASSIUM 875; 125 MG/1; MG/1
1 TABLET, FILM COATED ORAL 2 TIMES DAILY
Qty: 20 TABLET | Refills: 0 | Status: SHIPPED | OUTPATIENT
Start: 2024-05-14 | End: 2024-05-24

## 2024-05-14 ASSESSMENT — ENCOUNTER SYMPTOMS
SINUS PAIN: 0
FEVER: 0
STRIDOR: 0
WEAKNESS: 0
BLURRED VISION: 0
EYE PAIN: 0
NECK PAIN: 0
SPUTUM PRODUCTION: 0
HEADACHES: 0
MYALGIAS: 0
EYE REDNESS: 0
FOCAL WEAKNESS: 0
ABDOMINAL PAIN: 0
NAUSEA: 0
DIARRHEA: 0
WHEEZING: 0
COUGH: 1
EYE DISCHARGE: 0
PHOTOPHOBIA: 0
CHILLS: 0
SHORTNESS OF BREATH: 0
SENSORY CHANGE: 0
VOMITING: 0
SPEECH CHANGE: 0
SORE THROAT: 0
TINGLING: 0
DOUBLE VISION: 0

## 2024-05-14 ASSESSMENT — VISUAL ACUITY: OU: 1

## 2024-05-14 ASSESSMENT — FIBROSIS 4 INDEX: FIB4 SCORE: 0.52

## 2024-05-14 NOTE — PROGRESS NOTES
Subjective     Mary Toure is a 38 y.o. female who presents with right ear pain x1 week.     HPI:   Mary is a 39yo female presenting for right ear pain x1 week. Reports pain as sharp in nature. She has attempted Tylenol for relief. Reports associated ear discharge and bleeding. Reports muffled hearing. No fever. Denies foreign body in ear. No shortness of breath or cough. Denies abdominal pain, vomiting, or diarrhea. No facial swelling. Denies abnormal coordination or any neurological symptoms. No dysphagia.     Review of Systems   Constitutional:  Negative for chills, fever and malaise/fatigue.   HENT:  Positive for congestion, ear discharge and ear pain. Negative for sinus pain and sore throat.    Eyes:  Negative for blurred vision, double vision, photophobia, pain, discharge and redness.   Respiratory:  Positive for cough. Negative for sputum production, shortness of breath, wheezing and stridor.    Gastrointestinal:  Negative for abdominal pain, diarrhea, nausea and vomiting.   Musculoskeletal:  Negative for myalgias and neck pain.   Neurological:  Negative for tingling, sensory change, speech change, focal weakness, weakness and headaches.     Past Medical History:   Diagnosis Date    Anxiety and depression 01/19/2024    Cancer (HCC)     Crohn's disease (HCC) 20111    Hip pain, bilateral     IBD (inflammatory bowel disease)     Kidney stone 2008 to 2015    4x (mostly on right side).     Kidney stones 01/14/2020    Per patient confirmed calcium oxalate stone with urology  Does well when she stays hydrated  Encouraged to come to the office if this occurs again for treatment and to check for infection  IMO load March 2020    Migraines 1998    MRSA infection     sinus infection    Ovarian cyst     age 18, burst    PCOS (polycystic ovarian syndrome)     Takes spironolactone (ALDACTONE) 50 mg daily.    Pneumonia see CT scan    Recurrent sinusitis 01/14/2020    -Patient prefers antibiotic treatment at this  time.  We have had an antibiotic stewardship conversation.  Clindamycin is the next step given that she failed Augmentin and has confirmed MRSA on nasal swab.  However she has Crohn's disease and not comfortable prescribing this due to increased risk of C. difficile infection.  We will now try Bactrim and doxycycline for 10 days. -Patient aware of most    Syncope due to orthostatic hypotension      Past Surgical History:   Procedure Laterality Date    ADENOIDECTOMY N/A 05/18/2023    Procedure: ADENOIDECTOMY;  Surgeon: Harsh Shin M.D.;  Location: SURGERY SAME DAY [facility];  Service: Ent    LUMBAR TRANSFORAMINAL EPIDURAL STEROID INJECTION Right 05/17/2021    Procedure: RIGHT L4-5 transforaminal epidural steroid injection with sedation;  Surgeon: Godfrey Vigil M.D.;  Location: [facility];  Service: Pain Management    DENTAL EXTRACTION(S)        Allergies: Azathioprine, Hydrocortisone sod succinate, Sumatriptan, Infliximab, Lidocaine, Prochlorperazine, Promethazine, Zolmitriptan, Cedarwood oil, Grass extracts [gramineae pollens], Poison oak extract [extract of poison oak], and Pollen extract       Current Outpatient Medications:     SLYND 4 MG Tab, Take 1 Tablet by mouth every day., Disp: , Rfl:     topiramate (TOPAMAX) 25 MG capsule, Take 1 Capsule by mouth 2 times a day., Disp: 180 Capsule, Rfl: 1    DULoxetine (CYMBALTA) 30 MG Cap DR Particles, Take 1 Capsule by mouth every day., Disp: 90 Capsule, Rfl: 1    budesonide-formoterol (SYMBICORT) 160-4.5 MCG/ACT Aerosol, Inhale 2 Puffs 2 times a day. Use spacer. Rinse mouth after each use., Disp: 1 Each, Rfl: 6    vitamin D3 (CHOLECALCIFEROL) 1000 Unit (25 mcg) Tab, Take 2,000 Units by mouth every day., Disp: , Rfl:     inFLIXimab (REMICADE) 100 MG Recon Soln, Infuse  into a venous catheter. Every 8 weeks., Disp: , Rfl:     Adapalene 0.3 % Gel, , Disp: , Rfl:     Omega-3 Fatty Acids (FISH OIL) 1000 MG Cap capsule, Take 1,000 mg by mouth every  "day., Disp: , Rfl:     therapeutic multivitamin-minerals (THERAGRAN-M) Tab, Take 1 Tab by mouth every day., Disp: , Rfl:     Probiotic Product (PROBIOTIC-10 PO), Take  by mouth., Disp: , Rfl:      Social History     Tobacco Use    Smoking status: Never     Passive exposure: Never    Smokeless tobacco: Never   Vaping Use    Vaping Use: Never used   Substance Use Topics    Alcohol use: Not Currently     Comment: once every 2 weeks    Drug use: Yes     Frequency: 1.0 times per week     Types: Marijuana, Oral     Comment: edibles, once every 3 months      Family History   Problem Relation Age of Onset    Breast Cancer Mother 57        BRCA negative    Other Mother         MS, fibroids/hysterectomy    GI Disease Maternal Aunt         Crohn's    Other Maternal Uncle         agoraphobia    GI Disease Maternal Grandfather         Crohn's    Suicide Attempts Maternal Grandmother     Cancer Maternal Grandmother         uterine or fibrioids?, breast, skin, lung    Lung Disease Maternal Grandmother         emphysema    Psychiatric Illness Maternal Grandmother         suicide      Medications, Allergies, and current problem list reviewed today in Epic.      Objective     /78 (BP Location: Left arm, Patient Position: Sitting, BP Cuff Size: Large adult)   Pulse 91   Temp 36.4 °C (97.6 °F) (Temporal)   Resp 16   Ht 1.626 m (5' 4\")   Wt 91.2 kg (201 lb)   LMP 04/10/2024 (Exact Date)   SpO2 94%   BMI 34.50 kg/m²      Physical Exam  Vitals reviewed.   Constitutional:       General: She is not in acute distress.  HENT:      Right Ear: Drainage, swelling and tenderness present. Tympanic membrane is erythematous and bulging. Tympanic membrane is not perforated.      Left Ear: Tympanic membrane, ear canal and external ear normal. Tympanic membrane is not erythematous or bulging.      Ears:      Comments: Erythema and edema with serous drainage to external right auditory canal. Pain upon palpation of tragus and traction of " pinna.          Nose: Congestion present.      Right Sinus: No frontal sinus tenderness.      Left Sinus: No frontal sinus tenderness.      Mouth/Throat:      Mouth: Mucous membranes are moist.      Pharynx: Uvula midline. No oropharyngeal exudate, posterior oropharyngeal erythema or uvula swelling.   Eyes:      General: Vision grossly intact. Gaze aligned appropriately. No visual field deficit.     Extraocular Movements: Extraocular movements intact.      Conjunctiva/sclera: Conjunctivae normal.      Pupils: Pupils are equal, round, and reactive to light.   Cardiovascular:      Rate and Rhythm: Normal rate and regular rhythm.      Pulses: Normal pulses.      Heart sounds: Normal heart sounds.   Pulmonary:      Effort: Pulmonary effort is normal. No tachypnea, accessory muscle usage, prolonged expiration, respiratory distress or retractions.      Breath sounds: Normal breath sounds. No stridor. No wheezing, rhonchi or rales.   Musculoskeletal:      Cervical back: Full passive range of motion without pain, normal range of motion and neck supple. No rigidity or tenderness. Normal range of motion.   Lymphadenopathy:      Cervical: No cervical adenopathy.   Skin:     General: Skin is warm and dry.   Neurological:      General: No focal deficit present.      Mental Status: She is alert. Mental status is at baseline.      Sensory: Sensation is intact.      Motor: Motor function is intact.      Coordination: Coordination is intact.      Gait: Gait is intact.   Psychiatric:         Mood and Affect: Mood normal.         Behavior: Behavior normal.         Thought Content: Thought content normal.       Assessment & Plan     1. Right non-suppurative otitis media   - amoxicillin-clavulanate (AUGMENTIN) 875-125 MG Tab; Take 1 Tablet by mouth 2 times a day for 10 days.  Dispense: 20 Tablet; Refill: 0    2. Acute otitis externa of right ear, unspecified type   - ofloxacin otic sol (FLOXIN OTIC) 0.3 % Solution; Administer 10 Drops  into affected ear(s) every day for 7 days.  Dispense: 14 mL; Refill: 0       MDM/Comments:      Avoid Q-Tips or other objects in ear  Antibiotic ear drops  Tylenol for pain  Avoid contributing factors like water submersion, keep ear canal dry  Return if not improving in 48 hours or if getting worse  No indication for oral antibiotics at this point      History and examination most consistent with otitis externa and otitis media of right ear. Tympanic membrane intact with evidence of bulging or erythema.   Patient has history of Chron's disease. Risk for antibiotic-induced colitis discussed with patient, patient verbalizes understanding of risk and is in agreement with the plan of care.        Illness progression and alarm symptoms discussed with patient, emphasizing low threshold for returning to clinic/emergency department for worsening symptoms. Patient is agreeable to the plan and verbalizes understanding, and will follow up if warranted.        Differential diagnosis, natural history, supportive care, and indications for immediate follow-up discussed.       Follow-up as needed if symptoms worsen or fail to improve to PCP, Urgent care or Emergency Room.                              Electronically signed by YESI Galarza

## 2024-06-10 DIAGNOSIS — G43.119 INTRACTABLE MIGRAINE WITH AURA WITHOUT STATUS MIGRAINOSUS: ICD-10-CM

## 2024-06-10 RX ORDER — TOPIRAMATE SPINKLE 25 MG/1
CAPSULE ORAL
Qty: 270 CAPSULE | Refills: 0
Start: 2024-06-10

## 2024-06-26 ENCOUNTER — OFFICE VISIT (OUTPATIENT)
Dept: BEHAVIORAL HEALTH | Facility: CLINIC | Age: 39
End: 2024-06-26
Payer: COMMERCIAL

## 2024-06-26 DIAGNOSIS — F33.41 MAJOR DEPRESSIVE DISORDER, RECURRENT, IN PARTIAL REMISSION (HCC): ICD-10-CM

## 2024-06-26 PROCEDURE — 90791 PSYCH DIAGNOSTIC EVALUATION: CPT

## 2024-06-26 ASSESSMENT — ANXIETY QUESTIONNAIRES
3. WORRYING TOO MUCH ABOUT DIFFERENT THINGS: NOT AT ALL
GAD7 TOTAL SCORE: 3
5. BEING SO RESTLESS THAT IT IS HARD TO SIT STILL: NOT AT ALL
2. NOT BEING ABLE TO STOP OR CONTROL WORRYING: SEVERAL DAYS
4. TROUBLE RELAXING: SEVERAL DAYS
6. BECOMING EASILY ANNOYED OR IRRITABLE: NOT AT ALL
7. FEELING AFRAID AS IF SOMETHING AWFUL MIGHT HAPPEN: NOT AT ALL
IF YOU CHECKED OFF ANY PROBLEMS ON THIS QUESTIONNAIRE, HOW DIFFICULT HAVE THESE PROBLEMS MADE IT FOR YOU TO DO YOUR WORK, TAKE CARE OF THINGS AT HOME, OR GET ALONG WITH OTHER PEOPLE: NOT DIFFICULT AT ALL
1. FEELING NERVOUS, ANXIOUS, OR ON EDGE: SEVERAL DAYS

## 2024-06-26 ASSESSMENT — PATIENT HEALTH QUESTIONNAIRE - PHQ9
SUM OF ALL RESPONSES TO PHQ QUESTIONS 1-9: 7
5. POOR APPETITE OR OVEREATING: 2 - MORE THAN HALF THE DAYS
CLINICAL INTERPRETATION OF PHQ2 SCORE: 0

## 2024-06-26 NOTE — PROGRESS NOTES
"[facility]   Initial Assessment      Therapy was provided on this date in coordination with the [facility] approved Clinical Supervisor under the direct supervision of  who was on site during this visit.    Therapist reviewed informed consent, limits of confidentiality and [facility] policies; patient expressed understanding and agreed to voluntarily proceed with evaluation and treatment.    Name: Magaly Toure  MRN: [id number]  : 1985  Age: 38 y.o.  Date of assessment: 2024  PCP: Lena Randhawa P.A.-C.  Persons in attendance: Patient   Total session time: 47 minutes      CHIEF COMPLAINT AND HISTORY OF PRESENTING PROBLEM:  (as stated by Patient):  Magaly Toure is a 38 y.o., White female referred for assessment by Lolis Zhang M.D..  Primary presenting issue includes No chief complaint on file.  . Client reports wanting to establish care with a new therapist for depression and anxiety. Client reports having difficulty leaving the house sometimes. Client expressed noticing she has been \"not wanting to do anything\", \"everything looks extremely hard\", \"nothing seems like fun\" , \"no awareness to the depression until Wife (Chiquita) points out.\"     BEHAVIORAL HEALTH TREATMENT HISTORY  Does patient/parent report a history of prior behavioral health treatment for patient?  Hx of therapy. Currently seeing  for medication management.  History of untreated behavioral health issues identified? Yes  Does patient/parent report change in appetite or weight loss/gain?  \"All the time because of Crohn's.\"  Does patient/parent report physical pain? Yes              Indicate if pain is acute or chronic, and location: Chronic illness; Crohn's disease               Pain scale rating:  chronic pain; \"I don't notice it until I notice.\"          FAMILY/SOCIAL HISTORY  Current living situation/household members: Currently living with Wife (10 years together and 2 years " ") and Son 8 Humberto.  Going through IVF journey, which ended in April. Close relationship with Mom; engaged in \"spite therapy,\" to help get Mom into therapy.  Chiquita's family ; lives in [place] and [place]     Support system: Wife (Chiquita)    Does patient/parent report a family history of behavioral health issues, diagnoses, or treatment?   Grandmother; Narcissist; \"true american monster,\" grandmother committed suicide   Tried getting Mother into therapy; mom refuses to go   Mental Health stigma in the family.   Uncle; agoraphobia   Family History   Problem Relation Age of Onset    Breast Cancer Mother 57        BRCA negative    Other Mother         MS, fibroids/hysterectomy    GI Disease Maternal Aunt         Crohn's    Other Maternal Uncle         agoraphobia    GI Disease Maternal Grandfather         Crohn's    Suicide Attempts Maternal Grandmother     Cancer Maternal Grandmother         uterine or fibrioids?, breast, skin, lung    Lung Disease Maternal Grandmother         emphysema    Psychiatric Illness Maternal Grandmother         suicide          EMPLOYMENT/RESOURCES  Is the patient currently employed? Yes  Does the patient/parent report adequate financial resources? Yes       HISTORY:  Does patient report current or past enlistment? No              SPIRITUAL/CULTURAL/IDENTITY:  What are the patient's/family's spiritual beliefs or practices? Client reports growing up her family was very Scientologist.       ABUSE/NEGLECT/TRAUMA SCREENING  Does patient report feeling “unsafe” in his/her home, or afraid of anyone? No  Does patient report any history of physical, sexual, or emotional abuse?  \"Not relevant now.\"  Is there evidence of neglect by self? No  Is there evidence of neglect by a caregiver? No                                                                                                          SAFETY ASSESSMENT - SELF  Does patient acknowledge current or past symptoms of dangerousness to self? " "No  Recent change in frequency/specificity/intensity of suicidal thoughts or self-harm behavior? No  Current access to firearms, medications, or other identified means of suicide/self-harm? Yes  If yes, willing to restrict access to means of suicide/self-harm? Yes      Current Suicide Risk: Not applicable  Crisis Safety Plan completed and copy given to patient: No, The patient was educated to call 911, call the suicide hotline, or go to local ER if having thoughts of suicide or homicide; client verbalized understanding.      SAFETY ASSESSMENT - OTHERS  Recent change in frequency/specificity/intensity of thoughts or threats to harm others? No  If Yes:  Current access to firearms/other identified means of harm?   If yes, willing to restrict access to weapons/means of harm?     Current Homicide Risk:  Not applicable  Crisis Safety Plan completed and copy given to patient? No  Based on information provided during the current assessment, is a mandated “duty to warn” being exercised? No      SUBSTANCE USE/ADDICTION HISTORY  Patient denies use of any substance/addictive behaviors No    If No:  Is there a family history of substance use/addiction? Yes, maternal grandmother; cocaine and alcohol usage.  2 aunts; usage of cocaine. Client reports addiction and substance use in her family.   Does patient acknowledge or parent/significant other report use of/dependence on substances? No  Last time patient used alcohol: None reported  Within the past week? No  Last time patient used marijuana: For pain management, \"every 3 months.\" Last time used was January, 2024  Within the past month? No  Any other street drugs ever tried even once?  \"Party drugs,\" 10 years ago   Any use of prescription medications/pills without a prescription, or for reasons others than originally prescribed?  No  Any other addictive behavior reported (gambling, shopping, sex)? Hx of family addiction to shopping.     .Depression Screening    Little interest or " "pleasure in doing things?  0 - not at all  Feeling down, depressed , or hopeless? 0 - not at all  Patient Health Questionnaire Score: 0    If depressive symptoms identified deferred to follow up visit unless specifically addressed in assesment and plan.      Interpretation of PHQ-9 Total Score   Score Severity   1-4 Minimal Depression   5-9 Mild Depression   10-14 Moderate Depression   15-19 Moderately Severe Depression   20-27 Severe Depression      ..SANJUANA-7 Questionnaire    Feeling nervous, anxious, or on edge: Several days  Not being able to sop or control worrying: Several days  Worrying too much about different things: Not at all  Trouble relaxing: Several days  Being so restless that it's hard to sit still: Not at all  Becoming easily annoyed or irritable: Not at all  Feeling afraid as if something awful might happen: Not at all  Total: 3    Interpretation of SANJUANA 7 Total Score   Score Severity :  0-4 No Anxiety   5-9 Mild Anxiety  10-14 Moderate Anxiety  15-21 Severe Anxiety          MENTAL STATUS/OBSERVATIONS              Participation: Active verbal participation, Attentive, and Engaged  Grooming: Casual  Orientation:Alert and Fully Oriented   Behavior: Tense  Eye contact: Good          Mood:Anxious  Affect:Congruent with content  Thought process: Logical  Thought content:  Within normal limits  Speech: Rate within normal limits and Volume within normal limits  Perception: Within normal limits  Memory: No gross evidence of memory deficits  Insight: Adequate  Judgment:  Adequate  Other:               Family/couple interaction observations:       Patient's motivation/readiness for change: Be able to recognize self when in depression. Better manage   \"squirrelly feeling.\"     Topics addressed in psychotherapy include: Initial assessment and building rapport. Client and therapist discussed client anxiety and depression. Client expressed feeling as her wife Chiquita notices the depression more often. Client expressed " "feeling as she cannot see/feel the depression. Client and therapist discussed client anxiety, client describes as not being able to shut her off her brain or \"squirrelly thoughts\" (Racing thoughts.)  Client reports notice that she is worrying a lot when she is feeling anxious. Client reports  spin cycle, fan fiction, and her wife help her anxiety.      Diagnosis:  1. Major depressive disorder, recurrent, in partial remission (HCC)          Referral appointment(s) scheduled? Yes       Veena Delgadillo LMSW, CSW-Intern    "

## 2024-07-11 ENCOUNTER — OFFICE VISIT (OUTPATIENT)
Dept: URGENT CARE | Facility: CLINIC | Age: 39
End: 2024-07-11
Payer: COMMERCIAL

## 2024-07-11 VITALS
BODY MASS INDEX: 33.97 KG/M2 | OXYGEN SATURATION: 96 % | RESPIRATION RATE: 16 BRPM | HEART RATE: 78 BPM | DIASTOLIC BLOOD PRESSURE: 80 MMHG | WEIGHT: 199 LBS | HEIGHT: 64 IN | TEMPERATURE: 97.6 F | SYSTOLIC BLOOD PRESSURE: 106 MMHG

## 2024-07-11 DIAGNOSIS — J01.90 ACUTE BACTERIAL SINUSITIS: Primary | ICD-10-CM

## 2024-07-11 DIAGNOSIS — R05.1 ACUTE COUGH: ICD-10-CM

## 2024-07-11 DIAGNOSIS — B96.89 ACUTE BACTERIAL SINUSITIS: Primary | ICD-10-CM

## 2024-07-11 PROCEDURE — 3079F DIAST BP 80-89 MM HG: CPT | Performed by: PHYSICIAN ASSISTANT

## 2024-07-11 PROCEDURE — 99213 OFFICE O/P EST LOW 20 MIN: CPT | Performed by: PHYSICIAN ASSISTANT

## 2024-07-11 PROCEDURE — 3074F SYST BP LT 130 MM HG: CPT | Performed by: PHYSICIAN ASSISTANT

## 2024-07-11 RX ORDER — DOXYCYCLINE HYCLATE 100 MG
100 TABLET ORAL 2 TIMES DAILY
Qty: 14 TABLET | Refills: 0 | Status: SHIPPED | OUTPATIENT
Start: 2024-07-11 | End: 2024-07-18

## 2024-07-11 RX ORDER — BENZONATATE 100 MG/1
100 CAPSULE ORAL 3 TIMES DAILY PRN
Qty: 21 CAPSULE | Refills: 0 | Status: SHIPPED | OUTPATIENT
Start: 2024-07-11

## 2024-07-11 ASSESSMENT — FIBROSIS 4 INDEX: FIB4 SCORE: 0.52

## 2024-07-12 ENCOUNTER — APPOINTMENT (OUTPATIENT)
Dept: BEHAVIORAL HEALTH | Facility: CLINIC | Age: 39
End: 2024-07-12
Payer: COMMERCIAL

## 2024-07-17 ENCOUNTER — APPOINTMENT (OUTPATIENT)
Dept: BEHAVIORAL HEALTH | Facility: CLINIC | Age: 39
End: 2024-07-17
Payer: COMMERCIAL

## 2024-07-25 ENCOUNTER — APPOINTMENT (OUTPATIENT)
Dept: BEHAVIORAL HEALTH | Facility: CLINIC | Age: 39
End: 2024-07-25
Payer: COMMERCIAL

## 2024-07-26 ENCOUNTER — APPOINTMENT (OUTPATIENT)
Dept: BEHAVIORAL HEALTH | Facility: CLINIC | Age: 39
End: 2024-07-26
Payer: COMMERCIAL

## 2024-08-09 ENCOUNTER — OFFICE VISIT (OUTPATIENT)
Dept: BEHAVIORAL HEALTH | Facility: CLINIC | Age: 39
End: 2024-08-09
Payer: COMMERCIAL

## 2024-08-09 DIAGNOSIS — F33.41 MAJOR DEPRESSIVE DISORDER, RECURRENT, IN PARTIAL REMISSION (HCC): ICD-10-CM

## 2024-08-09 DIAGNOSIS — F41.1 GENERALIZED ANXIETY DISORDER: ICD-10-CM

## 2024-08-09 PROCEDURE — 90834 PSYTX W PT 45 MINUTES: CPT

## 2024-08-09 NOTE — PROGRESS NOTES
"[facility]   Therapy Progress Note      Therapy was provided on this date in coordination with the [facility] approved Clinical Supervisor under the direct supervision of  who was on site during this visit.    Therapist reviewed informed consent, limits of confidentiality and [facility] policies; patient expressed understanding and agreed to voluntarily proceed with evaluation and treatment.    Name: Magaly Toure  MRN: [id number]  : 1985  Age: 38 y.o.  Date of assessment: 2024  PCP: Lena Randhawa P.A.-C.  Persons in attendance: Patient   Total session time: 45 minutes      Topics addressed in psychotherapy include:     Data: Client reports therapeutic goal she has is to help understand her depression before it occurs. Client reports when it comes to depression she cannot see/feel the depression until her wife points it out.     Assessment: Client and therapist discussed client's therapeutic goal of wanting to understand when she is feeling depressed. Client and therapist discussed with client processing emotions, client expressed feeling as she will take a more logistic approach rather than an emotional approach. Therapist discussed with client processing emotions is different for everyone. Client and therapist discussed with client signs and sx client can look out for such as if she notices a change in emotions or change in interests, client was receptive. Therapist discussed with client communicating with her wife and asking what signs wife sees to help client understand them.     Plan: Therapist offered supportive psychotherapy. Plan of care is CBT.    Objective Observations:   Participation:Active verbal participation, Attentive, and Engaged   Grooming:Casual   Cognition:Alert and Fully Oriented   Eye Contact:Good   Mood: \"Okay\"   Affect:Congruent with content   Thought Process:Logical   Speech:Rate within normal limits    Current Risk:   Suicide:  Not " indicated   Homicide:  Not indicated   Self-Harm:  Not indicated    Relapse:  Not indicated    Safety Plan Reviewed: not applicable        Diagnosis:  1. Generalized anxiety disorder    2. Major depressive disorder, recurrent, in partial remission (HCC)            Veena Delgadillo LMSW, CSW-Intern

## 2024-08-11 DIAGNOSIS — G43.119 INTRACTABLE MIGRAINE WITH AURA WITHOUT STATUS MIGRAINOSUS: ICD-10-CM

## 2024-08-12 RX ORDER — TOPIRAMATE SPINKLE 25 MG/1
CAPSULE ORAL
Qty: 270 CAPSULE | Refills: 0 | Status: SHIPPED | OUTPATIENT
Start: 2024-08-12

## 2024-08-13 NOTE — TELEPHONE ENCOUNTER
Received request via: Pharmacy    Was the patient seen in the last year in this department? Yes    Does the patient have an active prescription (recently filled or refills available) for medication(s) requested? No    Pharmacy Name: [facility]/pharmacy #[id number]     Does the [facility] have [facility] and need 100-day supply? (This applies to ALL medications) Patient does not have SCP

## 2024-09-10 ENCOUNTER — OFFICE VISIT (OUTPATIENT)
Dept: BEHAVIORAL HEALTH | Facility: CLINIC | Age: 39
End: 2024-09-10
Payer: COMMERCIAL

## 2024-09-10 DIAGNOSIS — F41.1 GENERALIZED ANXIETY DISORDER: ICD-10-CM

## 2024-09-10 DIAGNOSIS — F33.41 MAJOR DEPRESSIVE DISORDER, RECURRENT, IN PARTIAL REMISSION (HCC): ICD-10-CM

## 2024-09-10 PROCEDURE — 90834 PSYTX W PT 45 MINUTES: CPT
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
## 2024-10-15 ENCOUNTER — OFFICE VISIT (OUTPATIENT)
Dept: BEHAVIORAL HEALTH | Facility: CLINIC | Age: 39
End: 2024-10-15
Payer: COMMERCIAL

## 2024-10-15 DIAGNOSIS — F33.41 MAJOR DEPRESSIVE DISORDER, RECURRENT, IN PARTIAL REMISSION (HCC): ICD-10-CM

## 2024-10-15 DIAGNOSIS — F41.1 GENERALIZED ANXIETY DISORDER: ICD-10-CM

## 2024-10-15 PROCEDURE — 99999 PR NO CHARGE: CPT

## 2024-10-18 ENCOUNTER — APPOINTMENT (OUTPATIENT)
Dept: MEDICAL GROUP | Facility: IMAGING CENTER | Age: 39
End: 2024-10-18
Payer: COMMERCIAL

## 2024-10-29 ENCOUNTER — TELEMEDICINE (OUTPATIENT)
Dept: BEHAVIORAL HEALTH | Facility: CLINIC | Age: 39
End: 2024-10-29
Payer: COMMERCIAL

## 2024-10-29 DIAGNOSIS — F33.41 MDD (MAJOR DEPRESSIVE DISORDER), RECURRENT, IN PARTIAL REMISSION (HCC): ICD-10-CM

## 2024-10-29 DIAGNOSIS — F41.1 GENERALIZED ANXIETY DISORDER: ICD-10-CM

## 2024-10-29 RX ORDER — DULOXETIN HYDROCHLORIDE 30 MG/1
30 CAPSULE, DELAYED RELEASE ORAL DAILY
Qty: 90 CAPSULE | Refills: 1 | Status: SHIPPED | OUTPATIENT
Start: 2024-10-29

## 2024-11-05 ENCOUNTER — APPOINTMENT (OUTPATIENT)
Dept: MEDICAL GROUP | Facility: IMAGING CENTER | Age: 39
End: 2024-11-05
Payer: COMMERCIAL

## 2024-11-10 DIAGNOSIS — G43.119 INTRACTABLE MIGRAINE WITH AURA WITHOUT STATUS MIGRAINOSUS: ICD-10-CM

## 2024-11-11 RX ORDER — TOPIRAMATE SPINKLE 25 MG/1
CAPSULE ORAL
Qty: 270 CAPSULE | Refills: 0 | Status: SHIPPED | OUTPATIENT
Start: 2024-11-11

## 2024-11-11 NOTE — TELEPHONE ENCOUNTER
Received request via: Pharmacy    Was the patient seen in the last year in this department? Yes    Does the patient have an active prescription (recently filled or refills available) for medication(s) requested? No    Pharmacy Name: [id number]    Does the patient have [facility] and need 100-day supply? (This applies to ALL medications) Patient does not have SCP

## 2025-01-15 ENCOUNTER — OFFICE VISIT (OUTPATIENT)
Dept: MEDICAL GROUP | Facility: IMAGING CENTER | Age: 40
End: 2025-01-15
Payer: COMMERCIAL

## 2025-01-15 ENCOUNTER — HOSPITAL ENCOUNTER (OUTPATIENT)
Dept: RADIOLOGY | Facility: MEDICAL CENTER | Age: 40
End: 2025-01-15
Attending: PHYSICIAN ASSISTANT
Payer: COMMERCIAL

## 2025-01-15 VITALS
TEMPERATURE: 97.2 F | HEIGHT: 64 IN | BODY MASS INDEX: 34.31 KG/M2 | SYSTOLIC BLOOD PRESSURE: 112 MMHG | HEART RATE: 83 BPM | OXYGEN SATURATION: 96 % | WEIGHT: 201 LBS | DIASTOLIC BLOOD PRESSURE: 68 MMHG | RESPIRATION RATE: 16 BRPM

## 2025-01-15 DIAGNOSIS — M79.662 PAIN IN LEFT SHIN: ICD-10-CM

## 2025-01-15 PROCEDURE — 3074F SYST BP LT 130 MM HG: CPT | Performed by: PHYSICIAN ASSISTANT

## 2025-01-15 PROCEDURE — 73590 X-RAY EXAM OF LOWER LEG: CPT | Mod: LT

## 2025-01-15 PROCEDURE — 99213 OFFICE O/P EST LOW 20 MIN: CPT | Performed by: PHYSICIAN ASSISTANT

## 2025-01-15 PROCEDURE — 3078F DIAST BP <80 MM HG: CPT | Performed by: PHYSICIAN ASSISTANT

## 2025-01-15 PROCEDURE — 1126F AMNT PAIN NOTED NONE PRSNT: CPT | Performed by: PHYSICIAN ASSISTANT

## 2025-01-15 RX ORDER — ESTRADIOL 0.1 MG/D
FILM, EXTENDED RELEASE TRANSDERMAL
COMMUNITY
Start: 2024-11-07

## 2025-01-15 ASSESSMENT — PAIN SCALES - GENERAL: PAINLEVEL_OUTOF10: NO PAIN

## 2025-01-15 ASSESSMENT — PATIENT HEALTH QUESTIONNAIRE - PHQ9: CLINICAL INTERPRETATION OF PHQ2 SCORE: 0

## 2025-01-15 ASSESSMENT — FIBROSIS 4 INDEX: FIB4 SCORE: 0.54

## 2025-01-15 NOTE — PROGRESS NOTES
Subjective:     CC:   Chief Complaint   Patient presents with    Bump     On left shin, hurt when pressing on it. Since Nov.        HPI:   Magaly presents today to discuss:    Pain in left shin  Pt admits to pain and discoloration on her left lower shin x 3 months.  States that the area was more red several months ago.  Now the lesion is dry and almost flesh toned.  The lesion is painful when she presses on the area, otherwise no discomfort at rest or with walking.  No known trauma. No blood thinners.  No ulceration.  No recent Crohn's flare, but she did have recent fissures.  On Remicade.    Past Medical History:   Diagnosis Date    Anxiety and depression 01/19/2024    Cancer (HCC)     Crohn's disease (HCC) 20111    Hip pain, bilateral     IBD (inflammatory bowel disease)     Kidney stone 2008 to 2015    4x (mostly on right side).     Kidney stones 01/14/2020    Per patient confirmed calcium oxalate stone with urology  Does well when she stays hydrated  Encouraged to come to the office if this occurs again for treatment and to check for infection  IMO load March 2020    Migraines 1998    MRSA infection     sinus infection    Ovarian cyst     age 18, burst    PCOS (polycystic ovarian syndrome)     Takes spironolactone (ALDACTONE) 50 mg daily.    Pneumonia see CT scan    Recurrent sinusitis 01/14/2020    -Patient prefers antibiotic treatment at this time.  We have had an antibiotic stewardship conversation.  Clindamycin is the next step given that she failed Augmentin and has confirmed MRSA on nasal swab.  However she has Crohn's disease and not comfortable prescribing this due to increased risk of C. difficile infection.  We will now try Bactrim and doxycycline for 10 days. -Patient aware of most    Syncope due to orthostatic hypotension      Family History   Problem Relation Age of Onset    Breast Cancer Mother 57        BRCA negative    Other Mother         MS, fibroids/hysterectomy    GI Disease Maternal Aunt          Crohn's    Other Maternal Uncle         agoraphobia    GI Disease Maternal Grandfather         Crohn's    Suicide Attempts Maternal Grandmother     Cancer Maternal Grandmother         uterine or fibrioids?, breast, skin, lung    Lung Disease Maternal Grandmother         emphysema    Psychiatric Illness Maternal Grandmother         suicide     Past Surgical History:   Procedure Laterality Date    ADENOIDECTOMY N/A 05/18/2023    Procedure: ADENOIDECTOMY;  Surgeon: Harsh Shin M.D.;  Location: SURGERY SAME DAY [facility];  Service: Ent    LUMBAR TRANSFORAMINAL EPIDURAL STEROID INJECTION Right 05/17/2021    Procedure: RIGHT L4-5 transforaminal epidural steroid injection with sedation;  Surgeon: Godfrey Vigil M.D.;  Location: [facility];  Service: Pain Management    DENTAL EXTRACTION(S)       Social History     Tobacco Use    Smoking status: Never     Passive exposure: Never    Smokeless tobacco: Never   Vaping Use    Vaping status: Never Used   Substance Use Topics    Alcohol use: Not Currently     Comment: once every 2 weeks    Drug use: Yes     Frequency: 1.0 times per week     Types: Marijuana, Oral     Comment: edibles, once every 3 months     Social History     Social History Narrative    Not on file     Current Outpatient Medications Ordered in Epic   Medication Sig Dispense Refill    estradiol (VIVELLE DOT) 0.1 MG/24HR PATCH BIWEEKLY PLACE ONE PATCH TO CLEAN DRY SKIN TWICE WEEKLY      topiramate (TOPAMAX) 25 MG capsule TAKE 1 CAPSULE IN THE MORNING AND 2 CAPSULES AT NIGHT. 270 Capsule 0    DULoxetine (CYMBALTA) 30 MG Cap DR Particles Take 1 Capsule by mouth every day. 90 Capsule 1    SLYND 4 MG Tab Take 1 Tablet by mouth every day.      budesonide-formoterol (SYMBICORT) 160-4.5 MCG/ACT Aerosol Inhale 2 Puffs 2 times a day. Use spacer. Rinse mouth after each use. 1 Each 6    vitamin D3 (CHOLECALCIFEROL) 1000 Unit (25 mcg) Tab Take 2,000 Units by mouth every day.      inFLIXimab (REMICADE)  "100 MG Recon Soln Infuse  into a venous catheter. Every 8 weeks.      Adapalene 0.3 % Gel       Omega-3 Fatty Acids (FISH OIL) 1000 MG Cap capsule Take 1,000 mg by mouth every day.      therapeutic multivitamin-minerals (THERAGRAN-M) Tab Take 1 Tab by mouth every day.      Probiotic Product (PROBIOTIC-10 PO) Take  by mouth.       No current Epic-ordered facility-administered medications on file.     Azathioprine, Hydrocortisone sod succinate, Sumatriptan, Infliximab, Lidocaine, Prochlorperazine, Promethazine, Zolmitriptan, Cedarwood oil, Grass extracts [gramineae pollens], Poison oak extract [extract of poison oak], and Pollen extract    PMH/PSH/FH/Social history reviewed.  Vaccinations discussed.  Previous records and labs reviewed. Discussed age appropriate anticipatory guidance.    ROS: see hpi  Gen: no fevers/chills  Pulm: no sob, no cough  CV: no chest pain, no palpitations, no edema  GI: no nausea/vomiting  Skin: no rash    Objective:   Exam:  /68 (BP Location: Right arm, Patient Position: Sitting, BP Cuff Size: Large adult)   Pulse 83   Temp 36.2 °C (97.2 °F) (Temporal)   Resp 16   Ht 1.613 m (5' 3.5\")   Wt 91.2 kg (201 lb)   LMP  (LMP Unknown)   SpO2 96%   BMI 35.05 kg/m²    Body mass index is 35.05 kg/m².    Gen: Alert and oriented, No apparent distress.  HEENT: Head atraumatic, normocephalic. Pupils equal and round.  Ext: No clubbing, cyanosis, edema.  Left distal shin with 1.5 x 1.5 cm area of mild hyperpigmentation and xeroderma with associated tenderness.  No induration or fluctuance.  No ulceration, vesicles, pustules.    Assessment & Plan:     39 y.o. female with the following -     1. Pain in left shin  With hyperpigmented lesion.  Unclear etiology.  Will check x-ray to rule out bony trauma.  Otherwise recommend patient follow-up with dermatology for evaluation.  - DX-TIBIA AND FIBULA LEFT; Future    Return in about 4 months (around 5/6/2025) for Annual physical.    Lena Randhawa (Baker)" ZANRDA  Physician Assistant Certified  [facility]      Please note that this dictation was created using voice recognition software. I have made every reasonable attempt to correct obvious errors, but I expect that there are errors of grammar and possibly content that I did not discover before finalizing the note.

## 2025-01-15 NOTE — PATIENT INSTRUCTIONS
It was a pleasure meeting with you today at [facility]!    Your medical history/records and medications were reviewed today.     UPDATE on MyChart Results: If you have blood work, and/or imaging studies, or any other test or procedure completed, you will have access to results as soon as they become available in MyChart. Recently, these results will be available for review at the same time that your provider is able to see results!    This will likely mean you will see a result before your provider has had a chance to review and discuss with you.  Some results or care notes may be hard to understand and may be serious in nature.    We look at every result and your provider will contact you to explain what they mean and discuss appropriate next steps. Please allow for at least 72 business hours for chart and result review.     We prefer that you wait for your care team to contact you with your results.  Often, your provider will discuss your results with you at your next appointment. We look forward to continuing to partner with you in your care.    Please review my practice information below:    If you have any prescription refill requests, please send them via [employer] or discuss with your provider at the start of your office visits. Please allow 3-5 business days for lab and testing review and you will be contacted via [employer] with those results, or if advised to make a follow up appointment regarding those results, then please do so.     Once resulted, your lab/test/imaging results will show up automatically in your MyChart. Please wait for my interpretation and recommendations prior to viewing your results to avoid any unnecessary confusion or misinterpretation. I will address all of the lab values that I interpret as abnormal and message you accordingly on your MyChart. I will always send you a message about your results even if they are normal. If you do not hear back from me within 5-7 business  days after completing your tests, then please send me a message on [employer] so I can obtain your results (especially if you went to an outside lab or imaging center - [facility], [facility], etc).     If you have any additional questions or concerns beyond my interpretation of your results, please make an appointment with me to discuss in further detail.    Please only use the [employer] messaging system for questions regarding your most recent appointment or if advised to use otherwise (glucose or blood pressure reporting).     If you have any new problems or concerns, you must make an appointment to discuss. This includes any referral requests, lab requests (unless advised to notify me for pre-appt labs), medication side effects, or request for medication adjustments.     Please arrive 15 minutes prior to your appointment time to complete your check-in and intake with the medical assistant.      Thank you,    Lena Randhawa PA-C (Baker)  Physician Assistant Certified  [facility]    -----------------------------------------------------------------    Attn: Patients of [facility]:    In an effort to continue to provide excellent and efficient care to our patients, it is vital that we continue to use our resources appropriately. With that, this is a reminder that [employer] is used for prescription refill requests, test results, virtual visits, and chart review only.     Any new questions, concerns/conditions, lab/imaging requests, medication adjustments, new prescriptions, or referral requests do require an appointment (virtually or in person), unless discussed otherwise at your most recent appointment.     Thank you for your understanding,    [facility]

## 2025-01-15 NOTE — ASSESSMENT & PLAN NOTE
Pt admits to pain and discoloration on her left lower shin x 3 months.  States that the area was more red several months ago.  Now the lesion is dry and almost flesh toned.  The lesion is painful when she presses on the area, otherwise no discomfort at rest or with walking.  No known trauma. No blood thinners.  No ulceration.  No recent Crohn's flare, but she did have recent fissures.  On Remicade.

## 2025-02-13 ENCOUNTER — APPOINTMENT (OUTPATIENT)
Dept: BEHAVIORAL HEALTH | Facility: CLINIC | Age: 40
End: 2025-02-13
Payer: COMMERCIAL

## 2025-02-13 ENCOUNTER — APPOINTMENT (OUTPATIENT)
Dept: URBAN - METROPOLITAN AREA CLINIC 4 | Facility: CLINIC | Age: 40
Setting detail: DERMATOLOGY
End: 2025-02-13

## 2025-02-13 DIAGNOSIS — L82.1 OTHER SEBORRHEIC KERATOSIS: ICD-10-CM

## 2025-02-13 DIAGNOSIS — D18.0 HEMANGIOMA: ICD-10-CM

## 2025-02-13 DIAGNOSIS — L30.9 DERMATITIS, UNSPECIFIED: ICD-10-CM

## 2025-02-13 DIAGNOSIS — D22 MELANOCYTIC NEVI: ICD-10-CM

## 2025-02-13 DIAGNOSIS — Z71.89 OTHER SPECIFIED COUNSELING: ICD-10-CM

## 2025-02-13 DIAGNOSIS — L81.4 OTHER MELANIN HYPERPIGMENTATION: ICD-10-CM

## 2025-02-13 PROBLEM — D18.01 HEMANGIOMA OF SKIN AND SUBCUTANEOUS TISSUE: Status: ACTIVE | Noted: 2025-02-13

## 2025-02-13 PROBLEM — D22.5 MELANOCYTIC NEVI OF TRUNK: Status: ACTIVE | Noted: 2025-02-13

## 2025-02-13 PROCEDURE — ? ADDITIONAL NOTES

## 2025-02-13 PROCEDURE — ? PRESCRIPTION

## 2025-02-13 PROCEDURE — ? COUNSELING

## 2025-02-13 PROCEDURE — 99213 OFFICE O/P EST LOW 20 MIN: CPT

## 2025-02-13 RX ORDER — MOMETASONE FUROATE 1 MG/G
CREAM TOPICAL
Qty: 15 | Refills: 1 | Status: ERX | COMMUNITY
Start: 2025-02-13

## 2025-02-13 RX ADMIN — MOMETASONE FUROATE: 1 CREAM TOPICAL at 00:00

## 2025-02-13 ASSESSMENT — LOCATION SIMPLE DESCRIPTION DERM
LOCATION SIMPLE: CHEST
LOCATION SIMPLE: RIGHT UPPER BACK

## 2025-02-13 ASSESSMENT — LOCATION DETAILED DESCRIPTION DERM
LOCATION DETAILED: RIGHT SUPERIOR MEDIAL UPPER BACK
LOCATION DETAILED: RIGHT MEDIAL UPPER BACK
LOCATION DETAILED: LEFT MEDIAL SUPERIOR CHEST
LOCATION DETAILED: UPPER STERNUM

## 2025-02-13 ASSESSMENT — LOCATION ZONE DERM: LOCATION ZONE: TRUNK

## 2025-02-13 NOTE — PROCEDURE: ADDITIONAL NOTES
Detail Level: Simple
Render Risk Assessment In Note?: no
Additional Notes: Patient to call when flaring so she can be evaluated as she has no active lesions today. Plan to start mometasone cream BID at first sign of flare to see if it can be minimized. Patient has Crohn’s, would like to avoid abx if possible.

## 2025-02-15 DIAGNOSIS — G43.119 INTRACTABLE MIGRAINE WITH AURA WITHOUT STATUS MIGRAINOSUS: ICD-10-CM

## 2025-02-18 RX ORDER — TOPIRAMATE SPINKLE 25 MG/1
CAPSULE ORAL
Qty: 90 CAPSULE | Refills: 0 | Status: SHIPPED | OUTPATIENT
Start: 2025-02-18

## 2025-02-18 NOTE — TELEPHONE ENCOUNTER
Received request via: Pharmacy    Was the patient seen in the last year in this department? Yes    Does the patient have an active prescription (recently filled or refills available) for medication(s) requested? No    Pharmacy Name: [facility] #[id number]    Does the patient have FPC Plus and need 100-day supply? (This applies to ALL medications) Patient does not have SCP

## 2025-03-11 DIAGNOSIS — G43.119 INTRACTABLE MIGRAINE WITH AURA WITHOUT STATUS MIGRAINOSUS: ICD-10-CM

## 2025-03-11 RX ORDER — TOPIRAMATE SPINKLE 25 MG/1
CAPSULE ORAL
Qty: 90 CAPSULE | Refills: 5 | Status: SHIPPED | OUTPATIENT
Start: 2025-03-11

## 2025-03-11 NOTE — TELEPHONE ENCOUNTER
Received request via: Pharmacy    Was the patient seen in the last year in this department? Yes    Does the patient have an active prescription (recently filled or refills available) for medication(s) requested? No    Pharmacy Name: [facility] #[id number]    Does the patient have skilled nursing Plus and need 100-day supply? (This applies to ALL medications) Patient does not have SCP

## 2025-03-13 ENCOUNTER — HOSPITAL ENCOUNTER (OUTPATIENT)
Dept: LAB | Facility: MEDICAL CENTER | Age: 40
End: 2025-03-13
Attending: NURSE PRACTITIONER
Payer: COMMERCIAL

## 2025-03-13 LAB
ALBUMIN SERPL BCP-MCNC: 4.2 G/DL (ref 3.2–4.9)
ALBUMIN/GLOB SERPL: 1.3 G/DL
ALP SERPL-CCNC: 60 U/L (ref 30–99)
ALT SERPL-CCNC: 36 U/L (ref 2–50)
ANION GAP SERPL CALC-SCNC: 11 MMOL/L (ref 7–16)
AST SERPL-CCNC: 30 U/L (ref 12–45)
BASOPHILS # BLD AUTO: 0.8 % (ref 0–1.8)
BASOPHILS # BLD: 0.06 K/UL (ref 0–0.12)
BILIRUB SERPL-MCNC: 0.3 MG/DL (ref 0.1–1.5)
BUN SERPL-MCNC: 15 MG/DL (ref 8–22)
CALCIUM ALBUM COR SERPL-MCNC: 8.9 MG/DL (ref 8.5–10.5)
CALCIUM SERPL-MCNC: 9.1 MG/DL (ref 8.5–10.5)
CHLORIDE SERPL-SCNC: 109 MMOL/L (ref 96–112)
CO2 SERPL-SCNC: 20 MMOL/L (ref 20–33)
CREAT SERPL-MCNC: 0.88 MG/DL (ref 0.5–1.4)
CRP SERPL HS-MCNC: <0.3 MG/DL (ref 0–0.75)
EOSINOPHIL # BLD AUTO: 0.18 K/UL (ref 0–0.51)
EOSINOPHIL NFR BLD: 2.3 % (ref 0–6.9)
ERYTHROCYTE [DISTWIDTH] IN BLOOD BY AUTOMATED COUNT: 48.2 FL (ref 35.9–50)
GFR SERPLBLD CREATININE-BSD FMLA CKD-EPI: 87 ML/MIN/1.73 M 2
GLOBULIN SER CALC-MCNC: 3.3 G/DL (ref 1.9–3.5)
GLUCOSE SERPL-MCNC: 88 MG/DL (ref 65–99)
HBV SURFACE AB SERPL IA-ACNC: 58.2 MIU/ML (ref 0–10)
HBV SURFACE AG SER QL: NORMAL
HCT VFR BLD AUTO: 42.7 % (ref 37–47)
HGB BLD-MCNC: 13.5 G/DL (ref 12–16)
IMM GRANULOCYTES # BLD AUTO: 0.03 K/UL (ref 0–0.11)
IMM GRANULOCYTES NFR BLD AUTO: 0.4 % (ref 0–0.9)
LYMPHOCYTES # BLD AUTO: 1.77 K/UL (ref 1–4.8)
LYMPHOCYTES NFR BLD: 22.3 % (ref 22–41)
MCH RBC QN AUTO: 32 PG (ref 27–33)
MCHC RBC AUTO-ENTMCNC: 31.6 G/DL (ref 32.2–35.5)
MCV RBC AUTO: 101.2 FL (ref 81.4–97.8)
MONOCYTES # BLD AUTO: 0.5 K/UL (ref 0–0.85)
MONOCYTES NFR BLD AUTO: 6.3 % (ref 0–13.4)
NEUTROPHILS # BLD AUTO: 5.38 K/UL (ref 1.82–7.42)
NEUTROPHILS NFR BLD: 67.9 % (ref 44–72)
NRBC # BLD AUTO: 0 K/UL
NRBC BLD-RTO: 0 /100 WBC (ref 0–0.2)
PLATELET # BLD AUTO: 322 K/UL (ref 164–446)
PMV BLD AUTO: 10.1 FL (ref 9–12.9)
POTASSIUM SERPL-SCNC: 4.5 MMOL/L (ref 3.6–5.5)
PROT SERPL-MCNC: 7.5 G/DL (ref 6–8.2)
RBC # BLD AUTO: 4.22 M/UL (ref 4.2–5.4)
SODIUM SERPL-SCNC: 140 MMOL/L (ref 135–145)
WBC # BLD AUTO: 7.9 K/UL (ref 4.8–10.8)

## 2025-03-13 PROCEDURE — 86140 C-REACTIVE PROTEIN: CPT

## 2025-03-13 PROCEDURE — 86706 HEP B SURFACE ANTIBODY: CPT

## 2025-03-13 PROCEDURE — 87340 HEPATITIS B SURFACE AG IA: CPT

## 2025-03-13 PROCEDURE — 85025 COMPLETE CBC W/AUTO DIFF WBC: CPT

## 2025-03-13 PROCEDURE — 36415 COLL VENOUS BLD VENIPUNCTURE: CPT

## 2025-03-13 PROCEDURE — 80053 COMPREHEN METABOLIC PANEL: CPT

## 2025-03-13 PROCEDURE — 86480 TB TEST CELL IMMUN MEASURE: CPT

## 2025-03-14 ENCOUNTER — HOSPITAL ENCOUNTER (OUTPATIENT)
Facility: MEDICAL CENTER | Age: 40
End: 2025-03-14
Attending: NURSE PRACTITIONER
Payer: COMMERCIAL

## 2025-03-14 PROCEDURE — 83993 ASSAY FOR CALPROTECTIN FECAL: CPT

## 2025-03-15 LAB
GAMMA INTERFERON BACKGROUND BLD IA-ACNC: 0.03 IU/ML
M TB IFN-G BLD-IMP: NEGATIVE
M TB IFN-G CD4+ BCKGRND COR BLD-ACNC: 0.01 IU/ML
MITOGEN IGNF BCKGRD COR BLD-ACNC: >10 IU/ML
QFT TB2 - NIL TBQ2: 0.01 IU/ML

## 2025-03-18 LAB — CALPROTECTIN STL-MCNT: 16 UG/G

## 2025-03-28 ENCOUNTER — HOSPITAL ENCOUNTER (OUTPATIENT)
Dept: RADIOLOGY | Facility: MEDICAL CENTER | Age: 40
End: 2025-03-28
Attending: NURSE PRACTITIONER
Payer: COMMERCIAL

## 2025-03-28 DIAGNOSIS — K50.00 CROHN'S DISEASE OF SMALL INTESTINE WITHOUT COMPLICATION (HCC): ICD-10-CM

## 2025-03-28 DIAGNOSIS — D84.821 IMMUNODEFICIENCY DUE TO DRUG THERAPY (HCC): ICD-10-CM

## 2025-03-28 DIAGNOSIS — K64.9 HEMORRHOIDS WITHOUT COMPLICATION: ICD-10-CM

## 2025-03-28 DIAGNOSIS — R11.0 NAUSEA: ICD-10-CM

## 2025-03-28 DIAGNOSIS — K60.2 ANAL FISSURE: ICD-10-CM

## 2025-03-28 DIAGNOSIS — Z79.899 IMMUNODEFICIENCY DUE TO DRUG THERAPY (HCC): ICD-10-CM

## 2025-03-28 PROCEDURE — 700117 HCHG RX CONTRAST REV CODE 255: Performed by: NURSE PRACTITIONER

## 2025-03-28 PROCEDURE — 74177 CT ABD & PELVIS W/CONTRAST: CPT

## 2025-03-28 RX ADMIN — IOHEXOL 100 ML: 350 INJECTION, SOLUTION INTRAVENOUS at 15:00

## 2025-03-28 RX ADMIN — Medication 946 ML: at 15:00

## 2025-03-31 RX ORDER — SORBITOL/MANNITOL/XANTHAN GUM
946 LIQUID (ML) ORAL ONCE
Status: COMPLETED | OUTPATIENT
Start: 2025-03-31 | End: 2025-03-28

## 2025-04-07 ENCOUNTER — TELEMEDICINE (OUTPATIENT)
Dept: MEDICAL GROUP | Facility: IMAGING CENTER | Age: 40
End: 2025-04-07
Payer: COMMERCIAL

## 2025-04-07 VITALS — BODY MASS INDEX: 39.07 KG/M2 | WEIGHT: 199 LBS | HEIGHT: 60 IN

## 2025-04-07 DIAGNOSIS — E55.9 VITAMIN D DEFICIENCY: ICD-10-CM

## 2025-04-07 DIAGNOSIS — R11.0 NAUSEA: ICD-10-CM

## 2025-04-07 DIAGNOSIS — R53.82 CHRONIC FATIGUE: ICD-10-CM

## 2025-04-07 DIAGNOSIS — D75.89 MACROCYTOSIS: ICD-10-CM

## 2025-04-07 PROBLEM — K64.9 HEMORRHOIDS: Status: RESOLVED | Noted: 2025-04-07 | Resolved: 2025-04-07

## 2025-04-07 PROBLEM — R10.11 RUQ ABDOMINAL PAIN: Status: RESOLVED | Noted: 2025-04-07 | Resolved: 2025-04-07

## 2025-04-07 PROBLEM — D25.9 UTERINE FIBROID: Status: ACTIVE | Noted: 2020-01-28

## 2025-04-07 PROBLEM — R10.13 PAIN IN EPIGASTRIC REGION ON PALPATION: Status: RESOLVED | Noted: 2025-04-07 | Resolved: 2025-04-07

## 2025-04-07 PROBLEM — K60.2 ANAL FISSURE: Status: RESOLVED | Noted: 2025-04-07 | Resolved: 2025-04-07

## 2025-04-07 PROBLEM — Z87.19 PERSONAL HISTORY OF OTHER DISEASES OF THE DIGESTIVE SYSTEM: Status: RESOLVED | Noted: 2025-04-07 | Resolved: 2025-04-07

## 2025-04-07 PROCEDURE — 99214 OFFICE O/P EST MOD 30 MIN: CPT | Mod: 95 | Performed by: PHYSICIAN ASSISTANT

## 2025-04-07 RX ORDER — RIBOFLAVIN (VITAMIN B2) 100 MG
TABLET ORAL
COMMUNITY

## 2025-04-07 RX ORDER — MOMETASONE FUROATE 1 MG/G
CREAM TOPICAL
COMMUNITY
Start: 2025-02-13

## 2025-04-07 RX ORDER — HYDROCORTISONE ACETATE 25 MG/1
SUPPOSITORY RECTAL
COMMUNITY
Start: 2025-02-12 | End: 2025-04-07

## 2025-04-07 RX ORDER — ONDANSETRON 4 MG/1
TABLET, ORALLY DISINTEGRATING ORAL
COMMUNITY
Start: 2025-03-23

## 2025-04-07 RX ORDER — PANTOPRAZOLE SODIUM 40 MG/1
40 TABLET, DELAYED RELEASE ORAL DAILY
Qty: 30 TABLET | Refills: 0 | Status: SHIPPED | OUTPATIENT
Start: 2025-04-07

## 2025-04-07 ASSESSMENT — FIBROSIS 4 INDEX: FIB4 SCORE: 0.57

## 2025-04-07 ASSESSMENT — PAIN SCALES - GENERAL: PAINLEVEL_OUTOF10: NO PAIN

## 2025-04-07 NOTE — PATIENT INSTRUCTIONS
It was a pleasure meeting with you today at [facility]!    Your medical history/records and medications were reviewed today.     UPDATE on MyChart Results: If you have blood work, and/or imaging studies, or any other test or procedure completed, you will have access to results as soon as they become available in MyChart. Recently, these results will be available for review at the same time that your provider is able to see results!    This will likely mean you will see a result before your provider has had a chance to review and discuss with you.  Some results or care notes may be hard to understand and may be serious in nature.    We look at every result and your provider will contact you to explain what they mean and discuss appropriate next steps. Please allow for at least 72 business hours for chart and result review.     We prefer that you wait for your care team to contact you with your results.  Often, your provider will discuss your results with you at your next appointment. We look forward to continuing to partner with you in your care.    Please review my practice information below:    If you have any prescription refill requests, please send them via [employer] or discuss with your provider at the start of your office visits. Please allow 3-5 business days for lab and testing review and you will be contacted via [employer] with those results, or if advised to make a follow up appointment regarding those results, then please do so.     Once resulted, your lab/test/imaging results will show up automatically in your MyChart. Please wait for my interpretation and recommendations prior to viewing your results to avoid any unnecessary confusion or misinterpretation. I will address all of the lab values that I interpret as abnormal and message you accordingly on your MyChart. I will always send you a message about your results even if they are normal. If you do not hear back from me within 5-7 business  days after completing your tests, then please send me a message on [employer] so I can obtain your results (especially if you went to an outside lab or imaging center - [facility], [facility], etc).     If you have any additional questions or concerns beyond my interpretation of your results, please make an appointment with me to discuss in further detail.    Please only use the [employer] messaging system for questions regarding your most recent appointment or if advised to use otherwise (glucose or blood pressure reporting).     If you have any new problems or concerns, you must make an appointment to discuss. This includes any referral requests, lab requests (unless advised to notify me for pre-appt labs), medication side effects, or request for medication adjustments.     Please arrive 15 minutes prior to your appointment time to complete your check-in and intake with the medical assistant.      Thank you,    Lena Randhawa PA-C (Baker)  Physician Assistant Certified  [facility]    -----------------------------------------------------------------    Attn: Patients of [facility]:    In an effort to continue to provide excellent and efficient care to our patients, it is vital that we continue to use our resources appropriately. With that, this is a reminder that [employer] is used for prescription refill requests, test results, virtual visits, and chart review only.     Any new questions, concerns/conditions, lab/imaging requests, medication adjustments, new prescriptions, or referral requests do require an appointment (virtually or in person), unless discussed otherwise at your most recent appointment.     Thank you for your understanding,    [facility]

## 2025-04-07 NOTE — ASSESSMENT & PLAN NOTE
Pt admits to nausea after eating - between 30-60 minutes after eating x 4 months. Symptoms started after she had norovirus. No recent nsaid use, but she does get a steroid injection with each Remicade infusion. Saw GI and had labs and a CT abd/pel ordered. Contracted gallbladder and high MCV noted.  Zofran controlling symptoms so far.  No abdominal pain, bloating, bowel habit changes.

## 2025-04-07 NOTE — ASSESSMENT & PLAN NOTE
Patient admits to chronic daily fatigue for the past 4 months.  Symptoms coincide with her daily nausea.  Labs show high MCV.  No recent medication changes.

## 2025-04-07 NOTE — PROGRESS NOTES
"Virtual Visit: Established Patient   This evaluation was conducted via Teams using secure and encrypted videoconferencing technology. The patient was in their home in the [facility].    The patient's identity was confirmed and verbal consent was obtained for this virtual visit.    Subjective:   CC:   Chief Complaint   Patient presents with    Follow-Up       Magaly Toure is a 39 y.o. female presenting for evaluation and management of:    Nausea  Pt admits to nausea after eating - between 30-60 minutes after eating x 4 months. Symptoms started after she had norovirus. No recent nsaid use, but she does get a steroid injection with each Remicade infusion. Saw GI and had labs and a CT abd/pel ordered. Contracted gallbladder and high MCV noted.  Zofran controlling symptoms so far.  No abdominal pain, bloating, bowel habit changes.    Chronic fatigue  Patient admits to chronic daily fatigue for the past 4 months.  Symptoms coincide with her daily nausea.  Labs show high MCV.  No recent medication changes.      ROS   Denies any recent fevers or chills. No nausea or vomiting. No chest pains or shortness of breath.     Allergies   Allergen Reactions    Azathioprine Itching and Rash     Other reaction(s): Accumulated in the liver    Other Reaction(s): Accumulated in the liver    Hydrocortisone Sod Succinate      Patient notes that it \"made her pass out\". Use solu-medrol for infusion premedication    Sumatriptan Shortness of Breath     Other reaction(s): \"Makes me fall asleep for large amounts of time\"    Other Reaction(s): \"Makes me fall asleep for large amounts of time\"    Infliximab      15min into infusion at 10cc/hr, pt reported chest tightness/ ticklish feeling and cough. Infusion stopped. O2 supplement/Benadryl /albuterol administered. Monitored for about 2hrs and discharged in stable condition. VSS throughout the treatment.     Lidocaine      Nasal spray causes fainting (dental shots were okay)    Other " "reaction(s): Hypotension    Other Reaction(s): Hypotension    Prochlorperazine      Other reaction(s): Dystonic    Other Reaction(s): Dystonic    Promethazine      Other reaction(s): Dystonic    Other Reaction(s): Dystonic    Zolmitriptan      Other reaction(s): \"Makes me fall asleep for large amounts of time\"    Other Reaction(s): \"Makes me fall asleep for large amounts of time\"    Cedarwood Oil     Grass Extracts [Gramineae Pollens]     Poison Oak Extract [Extract Of Poison Felch]     Pollen Extract        Current medicines (including changes today)  Current Outpatient Medications   Medication Sig Dispense Refill    mometasone (ELOCON) 0.1 % Cream APPLY THREE TIMES DAILY DIRECTLY TO AFFECTED AREAS AT FIRST SIGN OF FLARE      ondansetron (ZOFRAN ODT) 4 MG TABLET DISPERSIBLE TAKE 1 TABLET BY MOUTH THREE TIMES A DAY AS NEEDED AND AS NEEDED FO      Riboflavin (VITAMIN B-2) 100 MG Tab 0      pantoprazole (PROTONIX) 40 MG Tablet Delayed Response Take 1 Tablet by mouth every day. 30 Tablet 0    topiramate (TOPAMAX) 25 MG capsule TAKE 1 CAPSULE IN THE MORNING AND 2 CAPSULES AT NIGHT. 90 Capsule 5    estradiol (VIVELLE DOT) 0.1 MG/24HR PATCH BIWEEKLY PLACE ONE PATCH TO CLEAN DRY SKIN TWICE WEEKLY      DULoxetine (CYMBALTA) 30 MG Cap DR Particles Take 1 Capsule by mouth every day. 90 Capsule 1    SLYND 4 MG Tab Take 1 Tablet by mouth every day.      vitamin D3 (CHOLECALCIFEROL) 1000 Unit (25 mcg) Tab Take 2,000 Units by mouth every day.      inFLIXimab (REMICADE) 100 MG Recon Soln Infuse  into a venous catheter. Every 8 weeks.      Adapalene 0.3 % Gel       Omega-3 Fatty Acids (FISH OIL) 1000 MG Cap capsule Take 1,000 mg by mouth every day.      therapeutic multivitamin-minerals (THERAGRAN-M) Tab Take 1 Tab by mouth every day.      Probiotic Product (PROBIOTIC-10 PO) Take  by mouth.       No current facility-administered medications for this visit.       Patient Active Problem List    Diagnosis Date Noted    Nausea 04/07/2025 "    Chronic fatigue 04/07/2025    Macrocytosis 04/07/2025    Vitamin D deficiency 04/07/2025    Pain in left shin 01/15/2025    Chronic bilateral low back pain without sciatica 04/26/2024    Generalized anxiety disorder 04/24/2024    MDD (major depressive disorder), recurrent, in partial remission (HCC) 04/24/2024    Coccyx pain 01/19/2024    Obesity (BMI 30-39.9) 10/03/2022    Irregular menses 10/03/2022    PCOS (polycystic ovarian syndrome) 10/03/2022    Crohn's disease of small intestine without complications (HCC) 10/06/2020    Uterine fibroid 01/28/2020    Intractable migraine with aura without status migrainosus 06/17/2019       Family History   Problem Relation Age of Onset    Breast Cancer Mother 57        BRCA negative    Other Mother         MS, fibroids/hysterectomy    GI Disease Maternal Aunt         Crohn's    Other Maternal Uncle         agoraphobia    GI Disease Maternal Grandfather         Crohn's    Suicide Attempts Maternal Grandmother     Cancer Maternal Grandmother         uterine or fibrioids?, breast, skin, lung    Lung Disease Maternal Grandmother         emphysema    Psychiatric Illness Maternal Grandmother         suicide       She  has a past medical history of Anal fissure (04/07/2025), Anxiety and depression (01/19/2024), Cancer ([facility]), Crohn's disease ([facility]) ([address]), Hemorrhoids (04/07/2025), Hip pain, bilateral, IBD (inflammatory bowel disease), Kidney stone (2008 to 2015), Kidney stones (01/14/2020), Migraines (1998), MRSA infection, Nausea (04/07/2025), Ovarian cyst, Pain in epigastric region on palpation (04/07/2025), PCOS (polycystic ovarian syndrome), Personal history of other diseases of the digestive system (04/07/2025), Pneumonia (see CT scan), Recurrent sinusitis (01/14/2020), RUQ abdominal pain (04/07/2025), and Syncope due to orthostatic hypotension.    She has no past medical history of Addisons disease ([facility]), Adrenal disorder ([facility]), Allergy, Anemia, Arrhythmia, Arthritis,  "Asthma, Blood transfusion without reported diagnosis, Cataract, CHF (congestive heart failure) (HCC), Clotting disorder (HCC), COPD (chronic obstructive pulmonary disease) (HCC), Cushings syndrome (HCC), Diabetes (HCC), Diabetic neuropathy (HCC), GERD (gastroesophageal reflux disease), Glaucoma, Goiter, Head ache, Heart attack (HCC), Heart murmur, HIV (human immunodeficiency virus infection) (HCC), Hyperlipidemia, Muscle disorder, Osteoporosis, Parathyroid disorder (HCC), Pituitary disease (HCC), Pregnant, Pulmonary emphysema (HCC), Seizure (HCC), Sickle cell disease (HCC), Stroke (HCC), Substance abuse (HCC), Thyroid disease, Tuberculosis, or Urinary tract infection.  She  has a past surgical history that includes dental extraction(s); lumbar transforaminal epidural steroid injection (Right, 05/17/2021); and adenoidectomy (N/A, 05/18/2023).       Objective:   Ht 1.533 m (5' 0.35\")   Wt 90.3 kg (199 lb)   LMP  (LMP Unknown)   BMI 38.42 kg/m²   RR 12    Physical Exam:  Constitutional: Alert, no distress, well-groomed.  Skin: No rashes in visible areas.  Eye: Round. Conjunctiva clear, lids normal. No icterus.   ENMT: Lips pink without lesions, good dentition, moist mucous membranes. Phonation normal.  Neck: No masses, no thyromegaly. Moves freely without pain.  Respiratory: Unlabored respiratory effort, no cough or audible wheeze  Psych: Alert and oriented x3, normal affect and mood.     Assessment and Plan:   The following treatment plan was discussed:     1. Nausea  Chronic, postprandial.  Will trial PPI.  Avoid NSAIDs.  Assess for biliary disease with right upper quadrant ultrasound.  Labs and CT reviewed from GI.  If workup negative, will consider testing for H. pylori and SIBO.  - pantoprazole (PROTONIX) 40 MG Tablet Delayed Response; Take 1 Tablet by mouth every day.  Dispense: 30 Tablet; Refill: 0  - US-RUQ; Future  - LIPASE; Future  - GAMMA GT (GGT); Future    2. Chronic fatigue  - IRON/TOTAL IRON BIND; " Future  - FERRITIN; Future  - VITAMIN D,25 HYDROXY (DEFICIENCY); Future    3. Macrocytosis  - FOLATE; Future  - VITAMIN B12; Future  - TSH WITH REFLEX TO FT4; Future    4. Vitamin D deficiency  - VITAMIN D,25 HYDROXY (DEFICIENCY); Future      Follow-up: Return for Will notify patient to follow-up pending tests.         Lena Radnhawa PA-C ([facility])  Physician Assistant Certified  [facility]    Please note that this dictation was created using voice recognition software. I have made every reasonable attempt to correct obvious errors, but I expect that there are errors of grammar and possibly content that I did not discover before finalizing the note.

## 2025-04-08 ENCOUNTER — APPOINTMENT (OUTPATIENT)
Dept: LAB | Facility: MEDICAL CENTER | Age: 40
End: 2025-04-08
Payer: COMMERCIAL

## 2025-04-09 ENCOUNTER — HOSPITAL ENCOUNTER (OUTPATIENT)
Dept: LAB | Facility: MEDICAL CENTER | Age: 40
End: 2025-04-09
Attending: PHYSICIAN ASSISTANT
Payer: COMMERCIAL

## 2025-04-09 DIAGNOSIS — R11.0 NAUSEA: ICD-10-CM

## 2025-04-09 DIAGNOSIS — E55.9 VITAMIN D DEFICIENCY: ICD-10-CM

## 2025-04-09 DIAGNOSIS — R53.82 CHRONIC FATIGUE: ICD-10-CM

## 2025-04-09 DIAGNOSIS — D75.89 MACROCYTOSIS: ICD-10-CM

## 2025-04-09 LAB
25(OH)D3 SERPL-MCNC: 51 NG/ML (ref 30–100)
FERRITIN SERPL-MCNC: 28.2 NG/ML (ref 10–291)
FOLATE SERPL-MCNC: 34 NG/ML
GGT SERPL-CCNC: 24 U/L (ref 7–34)
IRON SATN MFR SERPL: 27 % (ref 15–55)
IRON SERPL-MCNC: 89 UG/DL (ref 40–170)
LIPASE SERPL-CCNC: 41 U/L (ref 11–82)
TIBC SERPL-MCNC: 332 UG/DL (ref 250–450)
TSH SERPL DL<=0.005 MIU/L-ACNC: 1.06 UIU/ML (ref 0.38–5.33)
UIBC SERPL-MCNC: 243 UG/DL (ref 110–370)
VIT B12 SERPL-MCNC: 742 PG/ML (ref 211–911)

## 2025-04-09 PROCEDURE — 83550 IRON BINDING TEST: CPT

## 2025-04-09 PROCEDURE — 36415 COLL VENOUS BLD VENIPUNCTURE: CPT

## 2025-04-09 PROCEDURE — 83540 ASSAY OF IRON: CPT

## 2025-04-09 PROCEDURE — 84443 ASSAY THYROID STIM HORMONE: CPT

## 2025-04-09 PROCEDURE — 82977 ASSAY OF GGT: CPT

## 2025-04-09 PROCEDURE — 82746 ASSAY OF FOLIC ACID SERUM: CPT

## 2025-04-09 PROCEDURE — 82306 VITAMIN D 25 HYDROXY: CPT

## 2025-04-09 PROCEDURE — 82607 VITAMIN B-12: CPT

## 2025-04-09 PROCEDURE — 82728 ASSAY OF FERRITIN: CPT

## 2025-04-09 PROCEDURE — 83690 ASSAY OF LIPASE: CPT

## 2025-04-10 ENCOUNTER — RESULTS FOLLOW-UP (OUTPATIENT)
Dept: MEDICAL GROUP | Facility: IMAGING CENTER | Age: 40
End: 2025-04-10
Payer: COMMERCIAL

## 2025-04-15 ENCOUNTER — RESULTS FOLLOW-UP (OUTPATIENT)
Dept: MEDICAL GROUP | Facility: IMAGING CENTER | Age: 40
End: 2025-04-15
Payer: COMMERCIAL

## 2025-04-15 ENCOUNTER — APPOINTMENT (OUTPATIENT)
Dept: RADIOLOGY | Facility: MEDICAL CENTER | Age: 40
End: 2025-04-15
Attending: PHYSICIAN ASSISTANT
Payer: COMMERCIAL

## 2025-04-15 DIAGNOSIS — R11.0 NAUSEA: ICD-10-CM

## 2025-04-15 PROCEDURE — 76705 ECHO EXAM OF ABDOMEN: CPT

## 2025-04-25 ENCOUNTER — TELEMEDICINE (OUTPATIENT)
Dept: BEHAVIORAL HEALTH | Facility: CLINIC | Age: 40
End: 2025-04-25
Payer: COMMERCIAL

## 2025-04-25 DIAGNOSIS — F41.1 GENERALIZED ANXIETY DISORDER: ICD-10-CM

## 2025-04-25 DIAGNOSIS — F33.41 MDD (MAJOR DEPRESSIVE DISORDER), RECURRENT, IN PARTIAL REMISSION (HCC): ICD-10-CM

## 2025-04-25 PROCEDURE — 99214 OFFICE O/P EST MOD 30 MIN: CPT | Mod: 95 | Performed by: PSYCHIATRY & NEUROLOGY

## 2025-04-25 RX ORDER — DULOXETIN HYDROCHLORIDE 30 MG/1
30 CAPSULE, DELAYED RELEASE ORAL DAILY
Qty: 90 CAPSULE | Refills: 1 | Status: SHIPPED | OUTPATIENT
Start: 2025-04-25

## 2025-04-25 RX ORDER — HYDROCORTISONE ACETATE 25 MG/1
SUPPOSITORY RECTAL
COMMUNITY
Start: 2025-02-12

## 2025-04-25 NOTE — PROGRESS NOTES
PSYCHIATRY VIRTUAL VISIT FOLLOW-UP NOTE    Chief Complaint   Patient presents with    Follow-Up     Depression, anxiety     This evaluation was conducted via Teams using secure and encrypted videoconferencing technology.   The patient was in their home in the [facility].    The patient's identity was confirmed and verbal consent was obtained for this virtual visit.     History Of Present Illness:  Magaly Toure is a 39 y.o. female with major depressive disorder, generalized anxiety disorder, PCOS, Crohn's disease, migraines comes in today for follow up, was last seen 6 months ago.  She has had some ups and downs in regards to her mental health primarily related to nausea, abdominal pain and fatigue that has been ongoing for the last few months.  She is working closely with her PCP in regards to finding less causing her symptoms.  She has been compliant with Cymbalta and is endorsing benefit from it.  She has also started to see a therapist every other week and that has been working well for her.  She denies having thoughts of wanting to hurt herself.    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    Substance Use:  Alcohol - Denies   Nicotine - Denies  Cannabis -  Infrequent, edibles every 3 months to manage GI pain during Crohn's diease exacerbation   Illicit drugs - Denies     Past Medication Trials:  None     Medications:  Current Outpatient Medications   Medication Sig Dispense Refill    hydrocortisone (ANUSOL-HC) 25 MG Suppos       DULoxetine (CYMBALTA) 30 MG Cap DR Particles Take 1 Capsule by mouth every day. 90 Capsule 1    mometasone (ELOCON) 0.1 % Cream APPLY THREE TIMES DAILY DIRECTLY TO AFFECTED AREAS AT FIRST SIGN OF FLARE      ondansetron (ZOFRAN ODT) 4 MG TABLET DISPERSIBLE TAKE 1 TABLET BY MOUTH THREE TIMES A DAY AS  NEEDED AND AS NEEDED FO      pantoprazole (PROTONIX) 40 MG Tablet Delayed Response Take 1 Tablet by mouth every day. 30 Tablet 0    topiramate (TOPAMAX) 25 MG capsule TAKE 1 CAPSULE IN THE MORNING AND 2 CAPSULES AT NIGHT. (Patient taking differently: Take 25 mg by mouth 2 times a day. Take 1 capsule in the morning and 2 capsules at night.) 90 Capsule 5    estradiol (VIVELLE DOT) 0.1 MG/24HR PATCH BIWEEKLY PLACE ONE PATCH TO CLEAN DRY SKIN TWICE WEEKLY      SLYND 4 MG Tab Take 1 Tablet by mouth every day.      inFLIXimab (REMICADE) 100 MG Recon Soln Infuse  into a venous catheter. Every 8 weeks.      Riboflavin (VITAMIN B-2) 100 MG Tab 0      vitamin D3 (CHOLECALCIFEROL) 1000 Unit (25 mcg) Tab Take 2,000 Units by mouth every day.      Adapalene 0.3 % Gel       Omega-3 Fatty Acids (FISH OIL) 1000 MG Cap capsule Take 1,000 mg by mouth every day.      therapeutic multivitamin-minerals (THERAGRAN-M) Tab Take 1 Tab by mouth every day.      Probiotic Product (PROBIOTIC-10 PO) Take  by mouth.       No current facility-administered medications for this visit.     Review Of Systems:    Psychiatric - Positive for infrequent anxiety, depression    Physical Examination:  Vital signs: LMP  (LMP Unknown)     Musculoskeletal: No abnormal movements.     Mental Status Evaluation:   General: Young female, dressed in casual attire, good grooming and hygiene, in no apparent distress, calm and cooperative, good eye contact, no psychomotor agitation or retardation  Orientation: Alert and oriented to person, place and time  Recent and remote memory: Grossly intact  Attention span and concentration: Grossly intact  Speech: Spontaneous, normal rate, rhythm and tone  Thought Process: Linear, logical and goal directed  Thought Content: Denies suicidal or homicidal ideations, intent or plan  Perception: Denies auditory or visual hallucinations. No delusions noted  Associations: Intact  Language: Appropriate  Fund of knowledge and vocabulary:  "Grossly adequate  Mood: \"good\"  Affect: Euthymic, mood congruent  Insight: Good  Judgment: Good    Depression screenin/24/2024     3:27 PM 2024    10:00 AM 1/15/2025     7:00 AM   Depression Screen (PHQ-2/PHQ-9)   PHQ-2 Total Score 0     PHQ-2 Total Score  0 0   PHQ-9 Total Score 3     PHQ-9 Total Score  7      Interpretation of PHQ-9 Total Score   Score Severity   1-4 No Depression   5-9 Mild Depression   10-14 Moderate Depression   15-19 Moderately Severe Depression   20-27 Severe Depression    Anxiety screenin/24/2024     2:05 PM 2024    10:12 AM   SANJUANA 7   SANJUANA-7 Total Score 4 3     Interpretation of SANJUANA 7 Total Score   Score Severity:  0-4 No Anxiety   5-9 Mild Anxiety  10-14 Moderate Anxiety  15-21 Severe Anxiety    Medical Records/Labs/Diagnostic Tests Reviewed:  [place] PDMP records - no prescription controlled medications in the last 2 years      Impression:  1.  Generalized anxiety disorder - stable  2.  Major depressive disorder, recurrent, in partial remission - stable    Plan:  Continue Cymbalta 30 mg daily for depression and anxiety  2.  Continue individual psychotherapy with Jg Rodrigues [facility] at [facility]    Return to clinic in 6 months or sooner if symptoms worsen    The proposed treatment plan was discussed with the patient who was provided the opportunity to ask questions and make suggestions regarding alternative treatment. Patient verbalized understanding and expressed agreement with the plan.     Lolis Zhang M.D.  25    This note was created using voice recognition software (Dragon). The accuracy of the dictation is limited by the abilities of the software. I have reviewed the note prior to signing, however some errors in grammar and context are still possible. If you have any questions related to this note please do not hesitate to contact our office.     "

## 2025-05-02 ENCOUNTER — HOSPITAL ENCOUNTER (OUTPATIENT)
Dept: RADIOLOGY | Facility: MEDICAL CENTER | Age: 40
End: 2025-05-02
Attending: PHYSICIAN ASSISTANT
Payer: COMMERCIAL

## 2025-05-02 DIAGNOSIS — R11.0 NAUSEA: ICD-10-CM

## 2025-05-02 PROCEDURE — 78227 HEPATOBIL SYST IMAGE W/DRUG: CPT

## 2025-05-05 ENCOUNTER — APPOINTMENT (OUTPATIENT)
Dept: MEDICAL GROUP | Facility: IMAGING CENTER | Age: 40
End: 2025-05-05
Payer: COMMERCIAL

## 2025-05-05 ENCOUNTER — RESULTS FOLLOW-UP (OUTPATIENT)
Dept: MEDICAL GROUP | Facility: IMAGING CENTER | Age: 40
End: 2025-05-05

## 2025-05-05 VITALS
SYSTOLIC BLOOD PRESSURE: 118 MMHG | BODY MASS INDEX: 34.15 KG/M2 | HEART RATE: 88 BPM | DIASTOLIC BLOOD PRESSURE: 82 MMHG | HEIGHT: 64 IN | TEMPERATURE: 98 F | RESPIRATION RATE: 16 BRPM | WEIGHT: 200 LBS | OXYGEN SATURATION: 96 %

## 2025-05-05 DIAGNOSIS — E28.2 PCOS (POLYCYSTIC OVARIAN SYNDROME): ICD-10-CM

## 2025-05-05 DIAGNOSIS — Z13.220 SCREENING CHOLESTEROL LEVEL: ICD-10-CM

## 2025-05-05 DIAGNOSIS — K50.00 CROHN'S DISEASE OF SMALL INTESTINE WITHOUT COMPLICATIONS (HCC): ICD-10-CM

## 2025-05-05 DIAGNOSIS — Z01.84 IMMUNITY STATUS TESTING: ICD-10-CM

## 2025-05-05 DIAGNOSIS — R11.0 NAUSEA: ICD-10-CM

## 2025-05-05 DIAGNOSIS — F41.1 GENERALIZED ANXIETY DISORDER: ICD-10-CM

## 2025-05-05 DIAGNOSIS — F33.41 MDD (MAJOR DEPRESSIVE DISORDER), RECURRENT, IN PARTIAL REMISSION (HCC): ICD-10-CM

## 2025-05-05 DIAGNOSIS — Z00.00 WELLNESS EXAMINATION: ICD-10-CM

## 2025-05-05 DIAGNOSIS — G43.119 INTRACTABLE MIGRAINE WITH AURA WITHOUT STATUS MIGRAINOSUS: ICD-10-CM

## 2025-05-05 PROBLEM — M53.3 COCCYX PAIN: Status: RESOLVED | Noted: 2024-01-19 | Resolved: 2025-05-05

## 2025-05-05 PROCEDURE — 1125F AMNT PAIN NOTED PAIN PRSNT: CPT | Performed by: PHYSICIAN ASSISTANT

## 2025-05-05 PROCEDURE — 3079F DIAST BP 80-89 MM HG: CPT | Performed by: PHYSICIAN ASSISTANT

## 2025-05-05 PROCEDURE — 3074F SYST BP LT 130 MM HG: CPT | Performed by: PHYSICIAN ASSISTANT

## 2025-05-05 PROCEDURE — 99395 PREV VISIT EST AGE 18-39: CPT | Performed by: PHYSICIAN ASSISTANT

## 2025-05-05 RX ORDER — TOPIRAMATE SPINKLE 25 MG/1
25 CAPSULE ORAL 2 TIMES DAILY
Qty: 180 CAPSULE | Refills: 2
Start: 2025-05-05

## 2025-05-05 RX ORDER — PANTOPRAZOLE SODIUM 40 MG/1
40 TABLET, DELAYED RELEASE ORAL DAILY
Qty: 30 TABLET | Refills: 0 | Status: SHIPPED | OUTPATIENT
Start: 2025-05-05

## 2025-05-05 SDOH — ECONOMIC STABILITY: INCOME INSECURITY: HOW HARD IS IT FOR YOU TO PAY FOR THE VERY BASICS LIKE FOOD, HOUSING, MEDICAL CARE, AND HEATING?: NOT HARD AT ALL

## 2025-05-05 SDOH — ECONOMIC STABILITY: FOOD INSECURITY: WITHIN THE PAST 12 MONTHS, THE FOOD YOU BOUGHT JUST DIDN'T LAST AND YOU DIDN'T HAVE MONEY TO GET MORE.: NEVER TRUE

## 2025-05-05 SDOH — HEALTH STABILITY: PHYSICAL HEALTH: ON AVERAGE, HOW MANY DAYS PER WEEK DO YOU ENGAGE IN MODERATE TO STRENUOUS EXERCISE (LIKE A BRISK WALK)?: 3 DAYS

## 2025-05-05 SDOH — ECONOMIC STABILITY: TRANSPORTATION INSECURITY
IN THE PAST 12 MONTHS, HAS LACK OF TRANSPORTATION KEPT YOU FROM MEETINGS, WORK, OR FROM GETTING THINGS NEEDED FOR DAILY LIVING?: NO

## 2025-05-05 SDOH — HEALTH STABILITY: PHYSICAL HEALTH: ON AVERAGE, HOW MANY MINUTES DO YOU ENGAGE IN EXERCISE AT THIS LEVEL?: 50 MIN

## 2025-05-05 SDOH — ECONOMIC STABILITY: HOUSING INSECURITY
IN THE LAST 12 MONTHS, WAS THERE A TIME WHEN YOU DID NOT HAVE A STEADY PLACE TO SLEEP OR SLEPT IN A SHELTER (INCLUDING NOW)?: NO

## 2025-05-05 SDOH — ECONOMIC STABILITY: INCOME INSECURITY: IN THE LAST 12 MONTHS, WAS THERE A TIME WHEN YOU WERE NOT ABLE TO PAY THE MORTGAGE OR RENT ON TIME?: NO

## 2025-05-05 SDOH — ECONOMIC STABILITY: FOOD INSECURITY: WITHIN THE PAST 12 MONTHS, YOU WORRIED THAT YOUR FOOD WOULD RUN OUT BEFORE YOU GOT MONEY TO BUY MORE.: NEVER TRUE

## 2025-05-05 SDOH — ECONOMIC STABILITY: TRANSPORTATION INSECURITY
IN THE PAST 12 MONTHS, HAS LACK OF RELIABLE TRANSPORTATION KEPT YOU FROM MEDICAL APPOINTMENTS, MEETINGS, WORK OR FROM GETTING THINGS NEEDED FOR DAILY LIVING?: NO

## 2025-05-05 SDOH — ECONOMIC STABILITY: TRANSPORTATION INSECURITY
IN THE PAST 12 MONTHS, HAS THE LACK OF TRANSPORTATION KEPT YOU FROM MEDICAL APPOINTMENTS OR FROM GETTING MEDICATIONS?: NO

## 2025-05-05 SDOH — HEALTH STABILITY: MENTAL HEALTH
STRESS IS WHEN SOMEONE FEELS TENSE, NERVOUS, ANXIOUS, OR CAN'T SLEEP AT NIGHT BECAUSE THEIR MIND IS TROUBLED. HOW STRESSED ARE YOU?: TO SOME EXTENT

## 2025-05-05 ASSESSMENT — SOCIAL DETERMINANTS OF HEALTH (SDOH)
DO YOU BELONG TO ANY CLUBS OR ORGANIZATIONS SUCH AS CHURCH GROUPS UNIONS, FRATERNAL OR ATHLETIC GROUPS, OR SCHOOL GROUPS?: PATIENT DECLINED
HOW HARD IS IT FOR YOU TO PAY FOR THE VERY BASICS LIKE FOOD, HOUSING, MEDICAL CARE, AND HEATING?: NOT HARD AT ALL
HOW MANY DRINKS CONTAINING ALCOHOL DO YOU HAVE ON A TYPICAL DAY WHEN YOU ARE DRINKING: 1 OR 2
HOW OFTEN DO YOU GET TOGETHER WITH FRIENDS OR RELATIVES?: PATIENT DECLINED
DO YOU BELONG TO ANY CLUBS OR ORGANIZATIONS SUCH AS CHURCH GROUPS UNIONS, FRATERNAL OR ATHLETIC GROUPS, OR SCHOOL GROUPS?: PATIENT DECLINED
HOW OFTEN DO YOU HAVE SIX OR MORE DRINKS ON ONE OCCASION: LESS THAN MONTHLY
IN A TYPICAL WEEK, HOW MANY TIMES DO YOU TALK ON THE PHONE WITH FAMILY, FRIENDS, OR NEIGHBORS?: PATIENT DECLINED
HOW OFTEN DO YOU HAVE A DRINK CONTAINING ALCOHOL: MONTHLY OR LESS
HOW OFTEN DO YOU ATTENT MEETINGS OF THE CLUB OR ORGANIZATION YOU BELONG TO?: PATIENT DECLINED
IN A TYPICAL WEEK, HOW MANY TIMES DO YOU TALK ON THE PHONE WITH FAMILY, FRIENDS, OR NEIGHBORS?: PATIENT DECLINED
IN THE PAST 12 MONTHS, HAS THE ELECTRIC, GAS, OIL, OR WATER COMPANY THREATENED TO SHUT OFF SERVICE IN YOUR HOME?: NO
HOW OFTEN DO YOU ATTENT MEETINGS OF THE CLUB OR ORGANIZATION YOU BELONG TO?: PATIENT DECLINED
HOW OFTEN DO YOU GET TOGETHER WITH FRIENDS OR RELATIVES?: PATIENT DECLINED
WITHIN THE PAST 12 MONTHS, YOU WORRIED THAT YOUR FOOD WOULD RUN OUT BEFORE YOU GOT THE MONEY TO BUY MORE: NEVER TRUE
HOW OFTEN DO YOU ATTEND CHURCH OR RELIGIOUS SERVICES?: PATIENT DECLINED
HOW OFTEN DO YOU ATTEND CHURCH OR RELIGIOUS SERVICES?: PATIENT DECLINED

## 2025-05-05 ASSESSMENT — FIBROSIS 4 INDEX: FIB4 SCORE: 0.57

## 2025-05-05 ASSESSMENT — LIFESTYLE VARIABLES
HOW OFTEN DO YOU HAVE SIX OR MORE DRINKS ON ONE OCCASION: LESS THAN MONTHLY
HOW OFTEN DO YOU HAVE A DRINK CONTAINING ALCOHOL: MONTHLY OR LESS
HOW MANY STANDARD DRINKS CONTAINING ALCOHOL DO YOU HAVE ON A TYPICAL DAY: 1 OR 2
AUDIT-C TOTAL SCORE: 2
SKIP TO QUESTIONS 9-10: 0

## 2025-05-05 ASSESSMENT — PAIN SCALES - GENERAL: PAINLEVEL_OUTOF10: 3=SLIGHT PAIN

## 2025-05-05 NOTE — ASSESSMENT & PLAN NOTE
Patient with history of generalized anxiety disorder and major depressive disorder.  Symptoms are currently controlled on duloxetine 30 mg daily.  Recently met with her psychiatrist.

## 2025-05-05 NOTE — ASSESSMENT & PLAN NOTE
Chronic, managed by gynecology.  On Slynd.  Has painful menstrual cycles and chronic RLQ pain. Concerns for endometriosis.

## 2025-05-05 NOTE — ASSESSMENT & PLAN NOTE
"On prophylactic Topamax.  States that she dropped the dose down to 25 mg twice a day as she was feeling \"off\" on the higher dosage.  Migraines are still controlled.  "

## 2025-05-05 NOTE — PATIENT INSTRUCTIONS
It was a pleasure meeting with you today at [facility]!    Your medical history/records and medications were reviewed today.     UPDATE on MyChart Results: If you have blood work, and/or imaging studies, or any other test or procedure completed, you will have access to results as soon as they become available in MyChart. Recently, these results will be available for review at the same time that your provider is able to see results!    This will likely mean you will see a result before your provider has had a chance to review and discuss with you.  Some results or care notes may be hard to understand and may be serious in nature.    We look at every result and your provider will contact you to explain what they mean and discuss appropriate next steps. Please allow for at least 72 business hours for chart and result review.     We prefer that you wait for your care team to contact you with your results.  Often, your provider will discuss your results with you at your next appointment. We look forward to continuing to partner with you in your care.    Please review my practice information below:    If you have any prescription refill requests, please send them via [employer] or discuss with your provider at the start of your office visits. Please allow 3-5 business days for lab and testing review and you will be contacted via [employer] with those results, or if advised to make a follow up appointment regarding those results, then please do so.     Once resulted, your lab/test/imaging results will show up automatically in your MyChart. Please wait for my interpretation and recommendations prior to viewing your results to avoid any unnecessary confusion or misinterpretation. I will address all of the lab values that I interpret as abnormal and message you accordingly on your MyChart. I will always send you a message about your results even if they are normal. If you do not hear back from me within 5-7 business  days after completing your tests, then please send me a message on Bunch so I can obtain your results (especially if you went to an outside lab or imaging center - [facility], [facility], etc).     If you have any additional questions or concerns beyond my interpretation of your results, please make an appointment with me to discuss in further detail.    Please only use the Bunch messaging system for questions regarding your most recent appointment or if advised to use otherwise (glucose or blood pressure reporting).     If you have any new problems or concerns, you must make an appointment to discuss. This includes any referral requests, lab requests (unless advised to notify me for pre-appt labs), medication side effects, or request for medication adjustments.     Please arrive 15 minutes prior to your appointment time to complete your check-in and intake with the medical assistant.      Thank you,    Lena Randhawa PA-C ([facility])  Physician Assistant Certified  [facility]    -----------------------------------------------------------------    Attn: Patients of [facility]:    In an effort to continue to provide excellent and efficient care to our patients, it is vital that we continue to use our resources appropriately. With that, this is a reminder that Bunch is used for prescription refill requests, test results, virtual visits, and chart review only.     Any new questions, concerns/conditions, lab/imaging requests, medication adjustments, new prescriptions, or referral requests do require an appointment (virtually or in person), unless discussed otherwise at your most recent appointment.     Thank you for your understanding,    [facility]

## 2025-05-05 NOTE — ASSESSMENT & PLAN NOTE
Overall improved since taking pantoprazole 40 mg daily.  Right upper quadrant ultrasound and HIDA scan within normal limits.  No evidence of gallbladder disease.

## 2025-05-05 NOTE — PROGRESS NOTES
"Subjective:     CC:    Chief Complaint   Patient presents with    Annual Exam         HISTORY OF THE PRESENT ILLNESS: Patient is a 39 y.o. female here to discuss:    Crohn's disease of small intestine without complications (HCC)  Chronic, symptoms currently controlled and stable.  On Remicade.    Generalized anxiety disorder  Patient with history of generalized anxiety disorder and major depressive disorder.  Symptoms are currently controlled on duloxetine 30 mg daily.  Recently met with her psychiatrist.    Intractable migraine with aura without status migrainosus  On prophylactic Topamax.  States that she dropped the dose down to 25 mg twice a day as she was feeling \"off\" on the higher dosage.  Migraines are still controlled.    PCOS (polycystic ovarian syndrome)  Chronic, managed by gynecology.  On Slynd.  Has painful menstrual cycles and chronic RLQ pain. Concerns for endometriosis.    Nausea  Overall improved since taking pantoprazole 40 mg daily.  Right upper quadrant ultrasound and HIDA scan within normal limits.  No evidence of gallbladder disease.      Health Maintenance/Anticipatory Guidance:  Diet:   - 3 meals per day   - 5-6 servings of fruits/veggies per day   - 60+ ounces of water   - 1 cups of caffeine   - 0-1 days per week eating out  Exercise:    - Cardio: walking dog   - Weight/resistance training: reformer pilates 2-3 days per week  Substance Abuse: no history  Safe in Relationships: yes  Sun Protection/Sunscreen: yes   Dermatologist: annually  Dentist: twice yearly appointments  Eye Doctor: SANDRA    Cancer Screening:  Cervical Cancer: pap 3/7/24    Infectious Disease Screening:  STI/HIV screening: declined    Immunizations: UTD    The ASCVD Risk score (Ginger ARIAS, et al., 2019) failed to calculate for the following reasons:    The 2019 ASCVD risk score is only valid for ages 40 to 79    Allergies:   Azathioprine, Hydrocortisone sod succinate, Sumatriptan, Infliximab, Lidocaine, Prochlorperazine, " Promethazine, Zolmitriptan, Cedarwood oil, Grass extracts [gramineae pollens], Poison oak extract [extract of poison oak], and Pollen extract  Current Outpatient Medications Ordered in Epic   Medication Sig Dispense Refill    pantoprazole (PROTONIX) 40 MG Tablet Delayed Response Take 1 Tablet by mouth every day. 30 Tablet 0    topiramate (TOPAMAX) 25 MG capsule Take 1 Capsule by mouth 2 times a day. 180 Capsule 2    DULoxetine (CYMBALTA) 30 MG Cap DR Particles Take 1 Capsule by mouth every day. 90 Capsule 1    mometasone (ELOCON) 0.1 % Cream APPLY THREE TIMES DAILY DIRECTLY TO AFFECTED AREAS AT FIRST SIGN OF FLARE      ondansetron (ZOFRAN ODT) 4 MG TABLET DISPERSIBLE TAKE 1 TABLET BY MOUTH THREE TIMES A DAY AS NEEDED AND AS NEEDED FO      Riboflavin (VITAMIN B-2) 100 MG Tab 0      estradiol (VIVELLE DOT) 0.1 MG/24HR PATCH BIWEEKLY PLACE ONE PATCH TO CLEAN DRY SKIN TWICE WEEKLY      SLYND 4 MG Tab Take 1 Tablet by mouth every day.      vitamin D3 (CHOLECALCIFEROL) 1000 Unit (25 mcg) Tab Take 2,000 Units by mouth every day.      inFLIXimab (REMICADE) 100 MG Recon Soln Infuse  into a venous catheter. Every 8 weeks.      Adapalene 0.3 % Gel       Omega-3 Fatty Acids (FISH OIL) 1000 MG Cap capsule Take 1,000 mg by mouth every day.      therapeutic multivitamin-minerals (THERAGRAN-M) Tab Take 1 Tab by mouth every day.      Probiotic Product (PROBIOTIC-10 PO) Take  by mouth.       No current Epic-ordered facility-administered medications on file.     Past Medical History:   Diagnosis Date    Anal fissure 04/07/2025    Anxiety and depression 01/19/2024    Cancer (HCC)     Crohn's disease (HCC) 20111    Hemorrhoids 04/07/2025    Hip pain, bilateral     IBD (inflammatory bowel disease)     Kidney stone 2008 to 2015    4x (mostly on right side).     Kidney stones 01/14/2020    Per patient confirmed calcium oxalate stone with urology  Does well when she stays hydrated  Encouraged to come to the office if this occurs again for  treatment and to check for infection  IMO load March 2020    Migraines 1998    MRSA infection     sinus infection    Nausea 04/07/2025    Ovarian cyst     age 18, burst    Pain in epigastric region on palpation 04/07/2025    PCOS (polycystic ovarian syndrome)     Takes spironolactone (ALDACTONE) 50 mg daily.    Personal history of other diseases of the digestive system 04/07/2025    Pneumonia see CT scan    Recurrent sinusitis 01/14/2020    -Patient prefers antibiotic treatment at this time.  We have had an antibiotic stewardship conversation.  Clindamycin is the next step given that she failed Augmentin and has confirmed MRSA on nasal swab.  However she has Crohn's disease and not comfortable prescribing this due to increased risk of C. difficile infection.  We will now try Bactrim and doxycycline for 10 days. -Patient aware of most    RUQ abdominal pain 04/07/2025    Syncope due to orthostatic hypotension      Past Surgical History:   Procedure Laterality Date    ADENOIDECTOMY N/A 05/18/2023    Procedure: ADENOIDECTOMY;  Surgeon: Harsh Shin M.D.;  Location: SURGERY SAME DAY [facility];  Service: Ent    LUMBAR TRANSFORAMINAL EPIDURAL STEROID INJECTION Right 05/17/2021    Procedure: RIGHT L4-5 transforaminal epidural steroid injection with sedation;  Surgeon: Godfrey Vigil M.D.;  Location: [facility];  Service: Pain Management    DENTAL EXTRACTION(S)       Social History     Tobacco Use    Smoking status: Never     Passive exposure: Never    Smokeless tobacco: Never   Vaping Use    Vaping status: Never Used   Substance Use Topics    Alcohol use: Not Currently     Comment: once every 2 weeks    Drug use: Not Currently     Types: Marijuana, Oral     Comment: edibles, once every 3 months     Social History     Social History Narrative    Not on file     Family History   Problem Relation Age of Onset    Breast Cancer Mother 57        BRCA negative    Other Mother         MS,  "fibroids/hysterectomy    GI Disease Maternal Aunt         Crohn's    Other Maternal Uncle         agoraphobia    GI Disease Maternal Grandfather         Crohn's    Suicide Attempts Maternal Grandmother     Cancer Maternal Grandmother         uterine or fibrioids?, breast, skin, lung    Lung Disease Maternal Grandmother         emphysema    Psychiatric Illness Maternal Grandmother         suicide        ROS: see hpi  Gen: no fevers/chills  Pulm: no sob, no cough  CV: no chest pain, no palpitations  GI: no nausea/vomiting, no diarrhea  Skin: no rash      Objective:     Exam: /82 (BP Location: Right arm, Patient Position: Sitting, BP Cuff Size: Adult)   Pulse 88   Temp 36.7 °C (98 °F) (Temporal)   Resp 16   Ht 1.626 m (5' 4\")   Wt 90.7 kg (200 lb)   SpO2 96%  Body mass index is 34.33 kg/m².    Gen: Alert and oriented, No apparent distress.  HEENT: Head atraumatic, normocephalic. PERRLA. EOMI. B/L TMs pearly gray without erythema or bulge.  Posterior pharynx non-erythematous.  Neck: Neck is supple without lymphadenopathy.  Full range of motion.  Lungs: Normal effort, CTA bilaterally, no wheezes, rhonchi, or rales  CV: Regular rate and rhythm. No murmurs, rubs, or gallops.  ABD: +BS. Non-tender, non-distended. No rebound, rigidity, or guarding.  Ext: No clubbing, cyanosis, edema.  2+ radial and dorsalis pedis pulses.  Neuro: CN II-XII intact, coordination intact bilaterally, no foot drop.  Skin: No rash or ulcerations.    Assessment & Plan:   39 y.o. female with the following -    1. Wellness examination  PMH/PSH/FH/Social history reviewed.  Vaccinations discussed.  Previous records and labs reviewed. Discussed age appropriate anticipatory guidance.    2. Crohn's disease of small intestine without complications (HCC)  Chronic, controlled and stable. Continue current regimen -Remicade per GI.    3. Generalized anxiety disorder  Chronic, controlled and stable. Continue current regimen -duloxetine 30 mg " daily.    4. PCOS (polycystic ovarian syndrome)  Chronic, follow-up with gynecology as scheduled.    5. Intractable migraine with aura without status migrainosus  Chronic, controlled and stable. Continue current regimen -   - topiramate (TOPAMAX) 25 MG capsule; Take 1 Capsule by mouth 2 times a day.  Dispense: 180 Capsule; Refill: 2    6. MDD (major depressive disorder), recurrent, in partial remission (HCC)  Chronic, controlled and stable. Continue current regimen -duloxetine 30 mg daily.    7. Nausea  Overall improving, continue Pantroprazole for one more month, then trial off of it. If symptoms recur, then complete testing below and follow up with GI.  - H.PYLORI STOOL ANTIGEN; Future  - SIBO BREATH TEST; Future  - pantoprazole (PROTONIX) 40 MG Tablet Delayed Response; Take 1 Tablet by mouth every day.  Dispense: 30 Tablet; Refill: 0    8. Screening cholesterol level  - Lipid Profile; Future    9. Immunity status testing  - RUBEOLA IGG AB; Future  - RUBELLA ABS IGG; Future  - MUMPS AB IGG; Future    Return for Will notify patient to follow-up pending tests.    Lena Randhawa PA-C ([place])  Physician Assistant Certified  [facility]    Please note that this dictation was created using voice recognition software. I have made every reasonable attempt to correct obvious errors, but I expect that there are errors of grammar and possibly content that I did not discover before finalizing the note.

## 2025-05-07 ENCOUNTER — HOSPITAL ENCOUNTER (OUTPATIENT)
Facility: MEDICAL CENTER | Age: 40
End: 2025-05-07
Payer: COMMERCIAL

## 2025-05-07 ENCOUNTER — OFFICE VISIT (OUTPATIENT)
Dept: URGENT CARE | Facility: CLINIC | Age: 40
End: 2025-05-07
Payer: COMMERCIAL

## 2025-05-07 VITALS
BODY MASS INDEX: 33.97 KG/M2 | TEMPERATURE: 97 F | HEART RATE: 91 BPM | OXYGEN SATURATION: 97 % | HEIGHT: 64 IN | DIASTOLIC BLOOD PRESSURE: 76 MMHG | WEIGHT: 199 LBS | SYSTOLIC BLOOD PRESSURE: 112 MMHG | RESPIRATION RATE: 16 BRPM

## 2025-05-07 DIAGNOSIS — J02.9 PHARYNGITIS, UNSPECIFIED ETIOLOGY: ICD-10-CM

## 2025-05-07 DIAGNOSIS — J02.9 PHARYNGITIS, UNSPECIFIED ETIOLOGY: Primary | ICD-10-CM

## 2025-05-07 LAB — S PYO DNA SPEC NAA+PROBE: NOT DETECTED

## 2025-05-07 PROCEDURE — 3078F DIAST BP <80 MM HG: CPT

## 2025-05-07 PROCEDURE — 99213 OFFICE O/P EST LOW 20 MIN: CPT

## 2025-05-07 PROCEDURE — 3074F SYST BP LT 130 MM HG: CPT

## 2025-05-07 PROCEDURE — 87070 CULTURE OTHR SPECIMN AEROBIC: CPT

## 2025-05-07 PROCEDURE — 87651 STREP A DNA AMP PROBE: CPT

## 2025-05-07 ASSESSMENT — ENCOUNTER SYMPTOMS
COUGH: 0
WHEEZING: 0
STRIDOR: 0
EYE REDNESS: 0
EYE PAIN: 0
HEADACHES: 0
VOMITING: 0
EYE DISCHARGE: 0
MYALGIAS: 0
SHORTNESS OF BREATH: 0
FEVER: 0
SPUTUM PRODUCTION: 0
PALPITATIONS: 0
DIZZINESS: 0
DIARRHEA: 0
NAUSEA: 0
CHILLS: 0
SORE THROAT: 1
ABDOMINAL PAIN: 0

## 2025-05-07 ASSESSMENT — FIBROSIS 4 INDEX: FIB4 SCORE: 0.57

## 2025-05-07 NOTE — PROGRESS NOTES
Subjective:   Magaly Toure is a 39 y.o. female who presents for Pharyngitis (Mostly on left side, occasional cough, left ear pain x 1day)          I introduced myself to the patient and informed them that I am a Family Nurse Practitioner.    HPI:Mary is a 39 year-old female who comes in today c/o pharyngitis. Onset was yesterday.  Patient describes symptoms as constant. They describe the pain as sharp, scratchy. Aggravating factors include eating, drinking, swallowing. Relieving factors include cold drinks. Treatments tried at home include Tylenol with minimal relief.  She avoids NSAIDs due to her Crohn's disease.  They describe their symptoms as moderate.  Patient denies any known exposure to COVID, flu, RSV, but states one of her child's friends is currently ill with strep.   She denies breastfeeding or any chance she could be pregnant currently.  She states she has had strep multiple times as an adult in the past and this feels consistent.  She does get treated with Remicade infusions every 8 weeks for Crohn's disease, her next infusion is due on Friday of this week (day after tomorrow).       Review of Systems   Constitutional:  Negative for chills, fever and malaise/fatigue.   HENT:  Positive for sore throat. Negative for congestion and ear pain.    Eyes:  Negative for pain, discharge and redness.   Respiratory:  Negative for cough, sputum production, shortness of breath, wheezing and stridor.    Cardiovascular:  Negative for chest pain and palpitations.   Gastrointestinal:  Negative for abdominal pain, diarrhea, nausea and vomiting.   Genitourinary:  Negative for dysuria.   Musculoskeletal:  Negative for myalgias.   Skin:  Negative for rash.   Neurological:  Negative for dizziness and headaches.       Medications: Adapalene Gel  DULoxetine Cpep  estradiol Pttw  fish oil Caps  inFLIXimab Solr  mometasone Crea  ondansetron Tbdp  pantoprazole Tbec  PROBIOTIC-10 PO  Slynd Tabs  therapeutic  multivitamin-minerals Tabs  topiramate  Vitamin B-2 Tabs  vitamin D3 Tabs     Allergies: Azathioprine, Hydrocortisone sod succinate, Sumatriptan, Infliximab, Lidocaine, Prochlorperazine, Promethazine, Zolmitriptan, Cedarwood oil, Grass extracts [gramineae pollens], Poison oak extract [extract of poison oak], and Pollen extract    Problem List: does not have any pertinent problems on file.    Surgical History:  Past Surgical History:   Procedure Laterality Date    ADENOIDECTOMY N/A 05/18/2023    Procedure: ADENOIDECTOMY;  Surgeon: Harsh Shin M.D.;  Location: SURGERY SAME DAY [facility];  Service: Ent    LUMBAR TRANSFORAMINAL EPIDURAL STEROID INJECTION Right 05/17/2021    Procedure: RIGHT L4-5 transforaminal epidural steroid injection with sedation;  Surgeon: Godfrey Vigil M.D.;  Location: [facility]ERY [facility]NA[facility];  Service: Pain Management    DENTAL EXTRACTION(S)         Past Social Hx:   reports that she has never smoked. She has never been exposed to tobacco smoke. She has never used smokeless tobacco. She reports that she does not currently use alcohol. She reports that she does not currently use drugs after having used the following drugs: Marijuana and Oral.     Past Family Hx:   family history includes Breast Cancer (age of onset: 57) in her mother; Cancer in her maternal grandmother; GI Disease in her maternal aunt and maternal grandfather; Lung Disease in her maternal grandmother; Other in her maternal uncle and mother; Psychiatric Illness in her maternal grandmother; Suicide Attempts in her maternal grandmother.     Problem list, medications, and allergies reviewed by myself today in Epic.   I have documented what I find to be significant in regards to past medical, social, family and surgical history  in my HPI or under PMH/PSH/FH review section, otherwise it is noncontributory     Objective:     /76 (BP Location: Left arm, Patient Position: Sitting, BP Cuff Size: Adult)   Pulse 91   Temp  "36.1 °C (97 °F) (Temporal)   Resp 16   Ht 1.626 m (5' 4\")   Wt 90.3 kg (199 lb)   LMP  (LMP Unknown)   SpO2 97%   BMI 34.16 kg/m²     During this visit, appropriate PPE was worn, and hand hygiene was performed.    Physical Exam  Vitals reviewed.   Constitutional:       General: She is not in acute distress.     Appearance: Normal appearance. She is not ill-appearing or toxic-appearing.   HENT:      Head: Normocephalic and atraumatic.      Right Ear: Tympanic membrane, ear canal and external ear normal. There is no impacted cerumen.      Left Ear: Tympanic membrane, ear canal and external ear normal. There is no impacted cerumen.      Nose: No congestion or rhinorrhea.      Mouth/Throat:      Pharynx: Oropharynx is clear. Posterior oropharyngeal erythema present. No oropharyngeal exudate.      Comments: Tonsillar swelling 1 + bilaterally with erythema and  no exudates present. There is posterior oropharyngeal erythema present, no exudates or cobblestoning.   No soft tissue swelling of the sublingual mucosa, no petechia or swelling of the soft or hard palate, no unilarteral oropharynx swelling, no sign of tonsillar stone, epiglottitis, or abscess.  Airway is patent and there is no stridor.  Patient is managing oral secretions appropriately.  Uvula is midline and appropriate size with no erythema or edema.     Eyes:      General: No scleral icterus.        Right eye: No discharge.         Left eye: No discharge.      Conjunctiva/sclera: Conjunctivae normal.      Pupils: Pupils are equal, round, and reactive to light.   Cardiovascular:      Rate and Rhythm: Normal rate and regular rhythm.      Heart sounds: Normal heart sounds. No murmur heard.     No friction rub. No gallop.   Pulmonary:      Effort: Pulmonary effort is normal. No respiratory distress.      Breath sounds: Normal breath sounds. No wheezing, rhonchi or rales.   Abdominal:      General: There is no distension.   Musculoskeletal:         General: " Normal range of motion.      Cervical back: Normal range of motion and neck supple. Tenderness present. No rigidity.      Right lower leg: No edema.      Left lower leg: No edema.   Lymphadenopathy:      Cervical: Cervical adenopathy present.   Skin:     General: Skin is warm and dry.      Coloration: Skin is not jaundiced.   Neurological:      General: No focal deficit present.      Mental Status: She is alert and oriented to person, place, and time. Mental status is at baseline.   Psychiatric:         Mood and Affect: Mood normal.         Behavior: Behavior normal.         Thought Content: Thought content normal.         Judgment: Judgment normal.         Lab Results/POC Test Results   Results for orders placed or performed in visit on 05/07/25   POCT CEPHEID GROUP A STREP - PCR    Collection Time: 05/07/25  3:00 AM   Result Value Ref Range    POC Group A Strep, PCR Not Detected Not Detected, Invalid             Assessment/Plan:     Diagnosis and associated orders:     1. Pharyngitis, unspecified etiology  POCT CEPHEID GROUP A STREP - PCR         Comments/MDM:     I discussed with patient I will call them only if PCR testing in clinic today is positive and we need to discuss further treatment otherwise treatment will be the supportive care measures we discussed in clinic today.  Results will be available on [employer] if they have this set up.  They state they understand and are agreeable.  We discussed viral versus bacterial infection, and I informed patient that if the strep PCR is negative then they likely have a self-limiting viral pharyngitis at this time and antibiotics are not an effective treatment for this.    I instructed them that the management for this is supportive care-we discussed cough/deep breathing exercises, drink plenty of fluids, get plenty of rest, try a humidifier in bedroom at night to help keep mucous membranes moist during sleep, gargle with salt water/honey/OTC Cepacol lozenges to help  ease sore throat.    may take tylenol 1000mg every 6 hours, do not exceed 3000 mg in 24 hours; ibuprofen (if not contraindicated) start with lowest effective dose, 200mg every 8 hours, may increase to up to 400 mg every 8 hours if needed, take with food.  Doses in excess of 400 mg have not being shown to increase therapeutic effect however do increase propensity for side effects/complications.   Discussed with patient that I will send a throat culture out of caution, will notify them if there is a positive result and treatment is indicated, will notify them via telephone/MyChart if they have it set up.  Patient was instructed that they should feel better in 7-10 days, (but some viral illnesses can last 2 weeks or longer, with residual cough possible for over a month) but to return to clinic if symptoms do not improve or worsen after 10-14 days.   We did discuss red flags and when to return to urgent care versus when to go to the emergency room and strict ER precautions were given.    I did print written instructions for patient, and did go over the diagnosis including differentials, and side effects of each medication with them and answer their questions to the best of my ability.   Advised the patient to follow-up with the primary care physician for recheck, reevaluation, and consideration of further management.  Strict ER precautions discussed for any  chest pain, difficulty breathing, difficulty swallowing, wheezing, stridor, or drooling, fever that does not respond to ibuprofen and Tylenol, inability to tolerate fluids, dehydration, lethargy.    Patient states they have good understanding and they are agreeable with the plan of care.           Pt is clinically stable at today's acute urgent care visit. Vital signs are normal and reassuring.  No acute distress noted. Appropriate for outpatient management at this time.        I personally reviewed prior external notes and test results pertinent to today's visit.   I have independently reviewed and interpreted all diagnostics ordered during this urgent care acute visit.        Please note that this dictation was created using voice recognition software. I have made a reasonable attempt to correct obvious errors, but I expect that there are errors of grammar and possibly content that I did not discover before finalizing the note.    This note was electronically signed by Arley KEY, ROSA M, TAN, GLORY

## 2025-05-29 NOTE — H&P (VIEW-ONLY)
SR[facility]  [facility]  [facility]  [address] [address]  Dept: [phone]  Dept Fax: [phone]  Loc: [phone]      Kendra Henriquez is a 54 y.o. female who presents todayfor her medical conditions/complaints as noted below.  Kendra Henriquez is c/o of Follow-up      :     [facility]    Pt presents for 6w follow-up.     -Shoulder pain: joint space narrowing on previous xray of R shoulder, on Baclofen as needed. Will take 3 Baclofen in the morning which helps. Has tried PT in the past but pain never went away.  -Migraines: Imitrex--as needed. Staying on top of it with Baclofen. Ibuprofen sometimes prevents it. Always has neck pain but doesn't always progress to migraine. Last one was Saturday. Get migraines about 2-3x a week, which is an improvement.   -Anxiety + Depression: Effexor-- stopped. Has restarted Prozac 20mg and has not had any GI sx. Reports improvements in mood and GI symptoms.   -IBS: improved with switch back to Prozac.   -Heartburn: Protonix -- still taking. Denies any sx. Tums help in acute flares.  -Hep B titers: low titers, recommended vaccine. Interested in getting this.   -Vit D deficiency: Low end of normal with 38, was told to continue Vit D supplement, needs refill.   -Hx Gastric bypass: no symptoms  -Mammogram: has van that comes in and does this, due in June  -Dermatology: Has appointment within the next couple months -- hyoscyamine not working. Spots on arms. Sometimes itchy. Arms, back, scalp, sometimes on legs.  Patient Active Problem List   Diagnosis    Anxiety    Depression    Heartburn    Migraine without aura and without status migrainosus, not intractable    Biliary disease    Gastric bypass status for obesity    Other seborrheic keratosis    Gastro-esophageal reflux disease without esophagitis    Melanoma in situ (HCC)    Status post bariatric surgery    Acne excoriee    Acquired absence of both cervix and uterus     New patient note    Physiatry (physical medicine and  Rehabilitation), interventional spine and sports medicine    Date of service: See epic    Chief complaint:   Chief Complaint   Patient presents with   • New Patient     Back pain        Referring provider: Carlos Renner M.D.     HISTORY    HPI: Magaly Lynch 35 y.o.  who presents today with Diagnoses of Lumbar radiculopathy, Chronic right-sided low back pain with right-sided sciatica, Lumbar disc herniation, and Vasovagal reaction were pertinent to this visit.    HPI  The patient has a history of a meniscus tear.  She has resumed running and while running she had an injury mostly in the right hip.  This is now radiating down from the right low back and hip down the posterior aspect of the right leg.  She has worked with physical therapy, sports medicine and her primary care physician on this.5/10 intensity. Constant. Denies numbness or weakness.     Medications tried include tylenol, gabapentin, NSAIDs, steroids    She did 6 weeks of physical therapy. She has a home exercise program and she has done that exercise program including the past two months.     Medical records review:  I reviewed the note from the referring provider Carlos Renner M.D. including the note dated 4/16/2020 and 4/5/2021.  I also reviewed multiple notes from physical therapy from Tammie Torres including most recent note from 4/1/2021..           ROS:   Red Flags ROS:   Fever, Chills, Sweats: Denies  Involuntary Weight Loss: Denies  Bladder Incontinence: Denies  Bowel Incontinence: denies  Saddle Anesthesia: Denies    All other systems reviewed and negative.       PMHx:   Past Medical History:   Diagnosis Date   • Crohn's disease (HCC) 20111   • Hip pain, bilateral    • Kidney stone 2008 to 2015    4x (mostly on right side).    • Migraines 1998   • MRSA infection     sinus infection   • Ovarian cyst     age 18, burst         Current Outpatient Medications on File Prior to Visit    Medication Sig Dispense Refill   • ethinyl estradiol-etonogestrel (NUVARING) 0.12-0.015 MG/24HR vaginal ring 1 ring PV x3wk, off x1wk. 3 Each 3   • inFLIXimab (REMICADE) 100 MG Recon Soln Infuse  into a venous catheter.     • Adapalene 0.3 % Gel      • Omega-3 Fatty Acids (FISH OIL) 1000 MG Cap capsule Take 1,000 mg by mouth every day.     • therapeutic multivitamin-minerals (THERAGRAN-M) Tab Take 1 Tab by mouth every day.     • Probiotic Product (PROBIOTIC-10 PO) Take  by mouth.       No current facility-administered medications on file prior to visit.        PSHx:   Past Surgical History:   Procedure Laterality Date   • DENTAL EXTRACTION(S)         Family history   Family History   Problem Relation Age of Onset   • Breast Cancer Mother 57        no genetic markers   • Other Mother         MS, fibroids/hysterectomy   • Cancer Maternal Grandmother         uterine or fibrioids?, breast, skin, lung   • Lung Disease Maternal Grandmother         emphysema   • Psychiatric Illness Maternal Grandmother         suicide   • GI Disease Maternal Grandfather         Crohn's   • GI Disease Maternal Aunt         Crohn's         Medications: reviewed on epic.   Outpatient Medications Marked as Taking for the 4/22/21 encounter (Office Visit) with Godfrey Vigil M.D.   Medication Sig Dispense Refill   • tizanidine (ZANAFLEX) 4 MG Tab Take 1 tablet by mouth at bedtime as needed (muscle spasms). 30 tablet 2   • ethinyl estradiol-etonogestrel (NUVARING) 0.12-0.015 MG/24HR vaginal ring 1 ring PV x3wk, off x1wk. 3 Each 3   • inFLIXimab (REMICADE) 100 MG Recon Soln Infuse  into a venous catheter.     • Adapalene 0.3 % Gel      • Omega-3 Fatty Acids (FISH OIL) 1000 MG Cap capsule Take 1,000 mg by mouth every day.     • therapeutic multivitamin-minerals (THERAGRAN-M) Tab Take 1 Tab by mouth every day.     • Probiotic Product (PROBIOTIC-10 PO) Take  by mouth.          Allergies:   Allergies   Allergen Reactions   • Sumatriptan Shortness  of Breath   • Infliximab      15min into infusion at 10cc/hr, pt reported chest tightness/ ticklish feeling and cough. Infusion stopped. O2 supplement/Benadryl /albuterol administered. Monitored for about 2hrs and discharged in stable condition. VSS throughout the treatment.    • Cedarwood Oil    • Grass Extracts [Gramineae Pollens]    • Lidocaine      Nasal spray causes fainting (dental shots were okay)   • Poison Pine Knot Extract [Extract Of Poison Pine Knot]    • Pollen Extract    • Prochlorperazine      distonic   • Promethazine      distonic   • Zolmitriptan      sleepy   • Azathioprine Itching and Rash       Social Hx:   Social History     Socioeconomic History   • Marital status: Single     Spouse name: Not on file   • Number of children: Not on file   • Years of education: Not on file   • Highest education level: Not on file   Occupational History   • Occupation: pharmceutical      Comment: Prior job   Tobacco Use   • Smoking status: Never Smoker   • Smokeless tobacco: Never Used   Substance and Sexual Activity   • Alcohol use: Yes     Comment: once a week   • Drug use: Yes     Types: Marijuana     Comment: past hallucinogens, smoking (vape)   • Sexual activity: Yes     Partners: Female, Male   Other Topics Concern   •  Service No   • Blood Transfusions No   • Caffeine Concern No   • Occupational Exposure No   • Hobby Hazards No   • Sleep Concern No   • Stress Concern No   • Weight Concern No   • Special Diet Yes   • Back Care No   • Exercise Yes   • Bike Helmet No   • Seat Belt Yes   • Self-Exams No   Social History Narrative   • Not on file     Social Determinants of Health     Financial Resource Strain:    • Difficulty of Paying Living Expenses:    Food Insecurity:    • Worried About Running Out of Food in the Last Year:    • Ran Out of Food in the Last Year:    Transportation Needs:    • Lack of Transportation (Medical):    • Lack of Transportation (Non-Medical):    Physical Activity:    • Days of  "Exercise per Week:    • Minutes of Exercise per Session:    Stress:    • Feeling of Stress :    Social Connections:    • Frequency of Communication with Friends and Family:    • Frequency of Social Gatherings with Friends and Family:    • Attends [employer] Services:    • Active Member of Clubs or Organizations:    • Attends Club or Organization Meetings:    • Marital Status:    Intimate Partner Violence:    • Fear of Current or Ex-Partner:    • Emotionally Abused:    • Physically Abused:    • Sexually Abused:          EXAMINATION     Physical Exam:   Vitals: /78 (BP Location: Left arm, Patient Position: Sitting, BP Cuff Size: Adult)   Pulse 73   Temp 36.4 °C (97.5 °F) (Temporal)   Ht 1.626 m (5' 4\")   Wt 79 kg (174 lb 2.6 oz)   SpO2 98%     Constitutional:   Body Habitus: Body mass index is 29.9 kg/m².  Cooperation: Fully cooperates with exam  Appearance: Well-groomed, well-nourished, not disheveled     Eyes: No scleral icterus to suggest severe liver disease, no proptosis to suggest severe hyperthyroid    ENT -no obvious auditory deficits, no obvious tongue lesions, tongue midline, no facial droop     Skin -no rashes or lesions noted     Respiratory-  breathing comfortable on room air, no audible wheezing    Cardiovascular- capillary refills less than 2 seconds. No lower extremity edema is noted.     Gastrointestinal - no obvious abdominal masses, No tenderness to palpation in the abdomen    Psychiatric- alert and oriented ×3. Normal affect.     Gait - normal gait, no use of ambulatory device, nonantalgic. the patient can toe walk with ease. the patient can heel walk with ease. the patient can tandem walk with ease. balance is appropriate..     Musculoskeletal and Neuro -         Thoracic/Lumbar Spine/Sacral Spine/Hips   Inspection: No evidence of atrophy in bilateral lower extremities throughout     ROM: full  active range of motion with flexion, lateral flexion, and rotation bilaterally.   There is " full  active range of motion with lumbar extension.    Palpation:   No tenderness to palpation in midline at T1-T12 levels. No tenderness to palpation in the left and right of the midline T1-L5, NEGATIVE for tenderness to palpation to the para-midline region in the lower lumbar levels.  palpation over SI joint: negative bilaterally    palpation in hip or over the gluteus medius tendon insertion: negative bilaterally      Lumbar spine Special tests  Neuro tension  Straight leg test positive right, negative left    Slump test positive right, negative left      HIP  FAIR test negative bilaterally          Key points for the international standards for neurological classification of spinal cord injury (ISNCSCI) to light touch.     Dermatome R L                                      L2 2 2   L3 2 2   L4 1 2   L5 1 2   S1 2 2   S2 2 2       Motor Exam Lower Extremities    ? Myotome R L   Hip flexion L2 5 5   Knee extension L3 5 5   Ankle dorsiflexion L4 5 5   Toe extension L5 5 5   Ankle plantarflexion S1 5 5         Reflexes  ?  R L                Patella  2+ 2+   Achilles   2+ 2+       Babinski sign negative bilaterally   Clonus of the ankle negative bilaterally       MEDICAL DECISION MAKING    Medical records review: see under HPI section.     DATA    Labs:   Lab Results   Component Value Date/Time    SODIUM 140 02/14/2020 08:52 AM    POTASSIUM 4.7 02/14/2020 08:52 AM    CHLORIDE 109 02/14/2020 08:52 AM    CO2 23 02/14/2020 08:52 AM    ANION 8.0 02/14/2020 08:52 AM    GLUCOSE 86 02/14/2020 08:52 AM    BUN 12 02/14/2020 08:52 AM    CREATININE 0.88 02/14/2020 08:52 AM    CALCIUM 9.2 02/14/2020 08:52 AM    ASTSGOT 16 02/14/2020 08:52 AM    ALTSGPT 12 02/14/2020 08:52 AM    TBILIRUBIN 0.4 02/14/2020 08:52 AM    ALBUMIN 3.9 02/14/2020 08:52 AM    TOTPROTEIN 7.1 02/14/2020 08:52 AM    GLOBULIN 3.2 02/14/2020 08:52 AM    AGRATIO 1.2 02/14/2020 08:52 AM   ]    No results found for: PROTHROMBTM, INR     Lab Results   Component  Value Date/Time    WBC 5.3 02/14/2020 08:52 AM    RBC 4.59 02/14/2020 08:52 AM    HEMOGLOBIN 14.5 02/14/2020 08:52 AM    HEMATOCRIT 43.5 02/14/2020 08:52 AM    MCV 94.8 02/14/2020 08:52 AM    MCH 31.6 02/14/2020 08:52 AM    MCHC 33.3 (L) 02/14/2020 08:52 AM    MPV 10.2 02/14/2020 08:52 AM    NEUTSPOLYS 51.80 02/14/2020 08:52 AM    LYMPHOCYTES 38.10 02/14/2020 08:52 AM    MONOCYTES 5.50 02/14/2020 08:52 AM    EOSINOPHILS 2.50 02/14/2020 08:52 AM    BASOPHILS 1.30 02/14/2020 08:52 AM        No results found for: HBA1C     Imaging:   I personally reviewed following images, these are my reads  MRI Lumbar spine 4/14/2021  There was a small high intensity zone in the right paracentric region consistent with an annular tear and a small disc herniation.  No significant neuroforaminal or central canal stenosis.  However annular tears can cause a chemical neuritis.    IMAGING radiology reads. I reviewed the following radiology reads     Results for orders placed in visit on 04/14/21   MR-LUMBAR SPINE-W/O    Impression 1.  L4-5 disc bulge with small right paracentral disc protrusion and tiny tear of the annulus fibrosis. No significant central canal or neural foraminal narrowing.    2.  Pelvic mass likely representing a retroverted uterus with multiple uterine fibroids.                                                                                                                 Results for orders placed during the hospital encounter of 01/21/21   DX-LUMBAR SPINE-2 OR 3 VIEWS    Impression 1.  Normal lumbar spine series.                                           Diagnosis   Visit Diagnoses     ICD-10-CM   1. Lumbar radiculopathy  M54.16   2. Chronic right-sided low back pain with right-sided sciatica  M54.41    G89.29   3. Lumbar disc herniation  M51.26   4. Vasovagal reaction  R55           ASSESSMENT AND PLAN:  Magaly Lynch 35 y.o. female      Magaly was seen today for new patient.    Diagnoses and all orders for this  visit:    Lumbar radiculopathy  -     REFERRAL TO PHYSICAL MEDICINE REHAB  -     tizanidine (ZANAFLEX) 4 MG Tab; Take 1 tablet by mouth at bedtime as needed (muscle spasms).    Chronic right-sided low back pain with right-sided sciatica  -     tizanidine (ZANAFLEX) 4 MG Tab; Take 1 tablet by mouth at bedtime as needed (muscle spasms).    Lumbar disc herniation  -     tizanidine (ZANAFLEX) 4 MG Tab; Take 1 tablet by mouth at bedtime as needed (muscle spasms).    Vasovagal reaction        The patient is failed conservative treatments including physical therapy medication management continues to have pain and functional deficits.  I believe this is mostly secondary to her disc herniation with lumbar radiculopathy.  Abnormal sensation was detected on exam as well.  Straight leg raise and slump test are positive on the right negative on the left.      Diagnostic workup: Procedure below for diagnostic and therapeutic purposes    Medications:   As above     Interventional program:   I have ordered a right L4-5 transforaminal epidural steroid injection with sedation    The risks benefits and alternatives to this procedure were discussed and the patient wishes to proceed with the procedure. Risks include but are not limited to damage to surrounding structures, infection, bleeding, worsening of pain which can be permanent, weakness which can be permanent. Benefits include pain relief, improved function. Alternatives includes not doing the procedure.          Follow-up: After the above diagnostic and therapeutic procedure    Total time spent on the day of the encounter: 65 minutes.  This includes counseling, care coordination, medical records review.          Please note that this dictation was created using voice recognition software. I have made every reasonable attempt to correct obvious errors but there may be errors of grammar and content that I may have overlooked prior to finalization of this note.      Godfrey Vigil,  MD  Physical Medicine and Rehabilitation  Interventional Spine and Sports Physiatry  [facility]           Carlos Alegria M.D. [facility] Vanessa A. Tabaac, M.D.       Addendum   In further discussion with the patient her pain level is typically 8 out of 10 in intensity.

## 2025-07-24 ENCOUNTER — APPOINTMENT (OUTPATIENT)
Dept: RADIOLOGY | Facility: IMAGING CENTER | Age: 40
End: 2025-07-24
Attending: PHYSICIAN ASSISTANT
Payer: COMMERCIAL

## 2025-07-24 ENCOUNTER — OFFICE VISIT (OUTPATIENT)
Dept: URGENT CARE | Facility: CLINIC | Age: 40
End: 2025-07-24
Payer: COMMERCIAL

## 2025-07-24 VITALS
DIASTOLIC BLOOD PRESSURE: 76 MMHG | HEART RATE: 100 BPM | HEIGHT: 64 IN | WEIGHT: 199.9 LBS | TEMPERATURE: 97.9 F | BODY MASS INDEX: 34.13 KG/M2 | RESPIRATION RATE: 14 BRPM | OXYGEN SATURATION: 95 % | SYSTOLIC BLOOD PRESSURE: 108 MMHG

## 2025-07-24 DIAGNOSIS — R06.02 SOB (SHORTNESS OF BREATH): ICD-10-CM

## 2025-07-24 DIAGNOSIS — R07.9 CHEST PAIN, UNSPECIFIED TYPE: ICD-10-CM

## 2025-07-24 PROCEDURE — 71046 X-RAY EXAM CHEST 2 VIEWS: CPT | Mod: TC | Performed by: STUDENT IN AN ORGANIZED HEALTH CARE EDUCATION/TRAINING PROGRAM

## 2025-07-24 ASSESSMENT — ENCOUNTER SYMPTOMS
NEUROLOGICAL NEGATIVE: 1
ORTHOPNEA: 0
PALPITATIONS: 0
EYES NEGATIVE: 1
VOMITING: 0
EXERTIONAL CHEST PRESSURE: 0
HEADACHES: 0
IRREGULAR HEARTBEAT: 0
MUSCULOSKELETAL NEGATIVE: 1
WHEEZING: 0
COUGH: 0
ABDOMINAL PAIN: 0
LOWER EXTREMITY EDEMA: 0
HEMOPTYSIS: 0
BACK PAIN: 0
CHILLS: 0
SHORTNESS OF BREATH: 1
DIZZINESS: 0
NAUSEA: 1
FEVER: 0
DIARRHEA: 0

## 2025-07-24 ASSESSMENT — FIBROSIS 4 INDEX: FIB4 SCORE: 0.57

## 2025-07-25 ENCOUNTER — HOSPITAL ENCOUNTER (OUTPATIENT)
Dept: RADIOLOGY | Facility: MEDICAL CENTER | Age: 40
End: 2025-07-25
Attending: OBSTETRICS & GYNECOLOGY
Payer: COMMERCIAL

## 2025-07-25 DIAGNOSIS — D75.1 ERYTHROCYTOSIS DUE TO UTERINE MYOMA: ICD-10-CM

## 2025-07-25 DIAGNOSIS — D25.9 ERYTHROCYTOSIS DUE TO UTERINE MYOMA: ICD-10-CM

## 2025-07-25 PROCEDURE — 700117 HCHG RX CONTRAST REV CODE 255: Mod: JZ | Performed by: OBSTETRICS & GYNECOLOGY

## 2025-07-25 PROCEDURE — 72197 MRI PELVIS W/O & W/DYE: CPT

## 2025-07-25 PROCEDURE — A9579 GAD-BASE MR CONTRAST NOS,1ML: HCPCS | Mod: JZ | Performed by: OBSTETRICS & GYNECOLOGY

## 2025-07-25 RX ORDER — GADOTERIDOL 279.3 MG/ML
20 INJECTION INTRAVENOUS ONCE
Status: COMPLETED | OUTPATIENT
Start: 2025-07-25 | End: 2025-07-25

## 2025-07-25 RX ADMIN — GADOTERIDOL 20 ML: 279.3 INJECTION, SOLUTION INTRAVENOUS at 09:45

## 2025-07-25 NOTE — PROGRESS NOTES
Subjective     Mary Toure is a very pleasant 39 y.o. female who presents with Chest Pain (Pt has chest pain, chest tightness, neck pain, back pain, nausea, right arm pain x 4 days )            Chest Pain   This is a new problem. The current episode started in the past 7 days (Monday). The onset quality is sudden. The problem occurs intermittently. The problem has been rapidly improving. The pain is present in the lateral region. The quality of the pain is described as sharp. The pain does not radiate. Associated symptoms include nausea and shortness of breath. Pertinent negatives include no abdominal pain, back pain, cough, dizziness, exertional chest pressure, fever, headaches, hemoptysis, irregular heartbeat, lower extremity edema, malaise/fatigue, orthopnea, palpitations or vomiting. She has tried nothing for the symptoms. The treatment provided no relief.   Her past medical history is significant for anxiety/panic attacks.       PMH:  has a past medical history of Anal fissure (04/07/2025), Anxiety and depression (01/19/2024), Cancer ([facility]), Crohn's disease ([facility]) ([address]11), Hemorrhoids (04/07/2025), Hip pain, bilateral, IBD (inflammatory bowel disease), Kidney stone (2008 to 2015), Kidney stones (01/14/2020), Migraines (1998), MRSA infection, Nausea (04/07/2025), Ovarian cyst, Pain in epigastric region on palpation (04/07/2025), PCOS (polycystic ovarian syndrome), Personal history of other diseases of the digestive system (04/07/2025), Pneumonia (see CT scan), Recurrent sinusitis (01/14/2020), RUQ abdominal pain (04/07/2025), and Syncope due to orthostatic hypotension.    She has no past medical history of Addisons disease ([facility]), Adrenal disorder ([facility]), Allergy, Anemia, Arrhythmia, Arthritis, Asthma, Blood transfusion without reported diagnosis, Cataract, CHF (congestive heart failure) ([facility]), Clotting disorder ([facility]), COPD (chronic obstructive pulmonary disease) ([facility]), Cushings syndrome ([facility]), Diabetes  (HCC), Diabetic neuropathy (HCC), GERD (gastroesophageal reflux disease), Glaucoma, Goiter, Head ache, Heart attack (HCC), Heart murmur, HIV (human immunodeficiency virus infection) (HCC), Hyperlipidemia, Muscle disorder, Osteoporosis, Parathyroid disorder (HCC), Pituitary disease (HCC), Pregnant, Pulmonary emphysema (HCC), Seizure (HCC), Sickle cell disease (HCC), Stroke (HCC), Substance abuse (HCC), Thyroid disease, Tuberculosis, or Urinary tract infection.  MEDS: Current Medications[1]  ALLERGIES: Allergies[2]  SURGHX: Past Surgical History[3]  SOCHX:  reports that she has never smoked. She has never been exposed to tobacco smoke. She has never used smokeless tobacco. She reports current alcohol use. She reports that she does not currently use drugs after having used the following drugs: Marijuana and Oral.  FH: family history includes Breast Cancer (age of onset: 57) in her mother; Cancer in her maternal grandmother; GI Disease in her maternal aunt and maternal grandfather; Lung Disease in her maternal grandmother; Other in her maternal uncle and mother; Psychiatric Illness in her maternal grandmother; Suicide Attempts in her maternal grandmother.      Review of Systems   Constitutional:  Negative for chills, fever and malaise/fatigue.   Eyes: Negative.    Respiratory:  Positive for shortness of breath. Negative for cough, hemoptysis and wheezing.    Cardiovascular:  Positive for chest pain. Negative for palpitations, orthopnea and leg swelling.   Gastrointestinal:  Positive for nausea. Negative for abdominal pain, diarrhea and vomiting.   Genitourinary: Negative.    Musculoskeletal: Negative.  Negative for back pain.   Neurological: Negative.  Negative for dizziness and headaches.       Medications, Allergies, and current problem list reviewed today in Epic           Objective     /76 (BP Location: Left arm, Patient Position: Sitting, BP Cuff Size: Large adult)   Pulse 100   Temp 36.6 °C (97.9 °F)  "(Temporal)   Resp 14   Ht 1.626 m (5' 4\")   Wt 90.7 kg (199 lb 14.4 oz)   SpO2 95%   BMI 34.31 kg/m²      Physical Exam  Vitals and nursing note reviewed.   Constitutional:       General: She is not in acute distress.     Appearance: Normal appearance. She is well-developed. She is not ill-appearing, toxic-appearing or diaphoretic.   HENT:      Head: Normocephalic and atraumatic.      Right Ear: Tympanic membrane, ear canal and external ear normal.      Left Ear: Tympanic membrane, ear canal and external ear normal.      Nose: Nose normal. No congestion or rhinorrhea.      Mouth/Throat:      Mouth: Mucous membranes are moist.      Pharynx: No oropharyngeal exudate or posterior oropharyngeal erythema.   Eyes:      General:         Right eye: No discharge.         Left eye: No discharge.      Conjunctiva/sclera: Conjunctivae normal.   Neck:      Vascular: No JVD.   Cardiovascular:      Rate and Rhythm: Normal rate and regular rhythm.      Pulses: Normal pulses.      Heart sounds: Normal heart sounds.   Pulmonary:      Effort: Pulmonary effort is normal. No respiratory distress.      Breath sounds: Normal breath sounds. No stridor. No wheezing, rhonchi or rales.   Chest:      Chest wall: Tenderness (Very mild chest wall TTP) present.   Abdominal:      General: Abdomen is flat. There is no distension.      Tenderness: There is no abdominal tenderness. There is no right CVA tenderness, left CVA tenderness, guarding or rebound.   Musculoskeletal:      Cervical back: Normal range of motion and neck supple.      Right lower leg: No edema.      Left lower leg: No edema.   Lymphadenopathy:      Cervical: No cervical adenopathy.   Skin:     General: Skin is warm and dry.      Findings: No rash.   Neurological:      General: No focal deficit present.      Mental Status: She is alert and oriented to person, place, and time. Mental status is at baseline.      Gait: Gait normal.   Psychiatric:         Mood and Affect: Mood " normal.         Behavior: Behavior normal.         Thought Content: Thought content normal.         Judgment: Judgment normal.                                  Assessment & Plan  Chest pain, unspecified type  The ASCVD Risk score (Ginger DK, et al., 2019) failed to calculate for the following reasons:    The 2019 ASCVD risk score is only valid for ages 40 to 79      Orders:    EKG - Clinic Performed    DX-CHEST-2 VIEWS; Future    EKG: Normal sinus rhythm and rate.  Normal regular [place] intervals without QT prolongation.  No ST elevation or ischemia.  No arrhythmia.  No previous EKG for comparison.    SOB (shortness of breath)    Orders:    DX-CHEST-2 VIEWS; Future       Very pleasant 39-year-old female presenting with left-sided chest pain.  She had 10 minutes of intense left-sided chest pain shortness of breath on Monday.  Symptoms have significantly improved and almost completely resolved.  She has some slight reproducible chest wall TTP.  During time of pain she did have nausea that has resolved.  She denies headache, dizziness, leg swelling, calf tenderness, cough, neck pain, fever, blurry vision.  No pertinent past cardiopulmonary history.  She does have positive autoimmune history.  Vital signs are stable.  Cardiopulmonary auscultation at baseline.  No lower leg swelling calf tenderness or JVD.  She does have slight reproducible chest wall TTP.  Abdominal exam benign.  No abnormality seen on exam.  EKG and chest x-ray normal.  Negative Wells score.  No discernible risk factors or pertinent past history.  Unclear etiology.  Monitor symptoms and progression.  To ER immediately for any worsening or changing symptoms.      I personally reviewed prior external notes and test results pertinent to today's visit. Return to clinic or go to ED if symptoms worsen or persist. Red flag symptoms and indications for ED discussed at length. Patient/Parent/Guardian voices understanding.  AVS with post-visit instructions printed  "and provided or given verbally.  Follow-up with your primary care provider in 3-5 days. All side effects and potential interactions of prescribed medication discussed including allergic response, GI upset, tendon injury, rash, sedation, OCP effectiveness, etc.    Please note that this dictation was created using voice recognition software. I have made every reasonable attempt to correct obvious errors, but I expect that there are errors of grammar and possibly content that I did not discover before finalizing the note.             [1]   Current Outpatient Medications:     topiramate (TOPAMAX) 25 MG capsule, Take 1 Capsule by mouth 2 times a day., Disp: 180 Capsule, Rfl: 2    DULoxetine (CYMBALTA) 30 MG Cap DR Particles, Take 1 Capsule by mouth every day., Disp: 90 Capsule, Rfl: 1    ondansetron (ZOFRAN ODT) 4 MG TABLET DISPERSIBLE, TAKE 1 TABLET BY MOUTH THREE TIMES A DAY AS NEEDED AND AS NEEDED FO, Disp: , Rfl:     estradiol (VIVELLE DOT) 0.1 MG/24HR PATCH BIWEEKLY, PLACE ONE PATCH TO CLEAN DRY SKIN TWICE WEEKLY, Disp: , Rfl:     SLYND 4 MG Tab, Take 1 Tablet by mouth every day., Disp: , Rfl:     inFLIXimab (REMICADE) 100 MG Recon Soln, Infuse  into a venous catheter. Every 8 weeks., Disp: , Rfl:     Adapalene 0.3 % Gel, , Disp: , Rfl:   [2]   Allergies  Allergen Reactions    Azathioprine Rash and Itching     Other reaction(s): Accumulated in the liver    Hydrocortisone Sod Succinate      Patient notes that it \"made her pass out\". Use solu-medrol for infusion premedication    Sumatriptan Shortness of Breath     Other reaction(s): \"Makes me fall asleep for large amounts of time\"      Infliximab      15min into infusion at 10cc/hr, pt reported chest tightness/ ticklish feeling and cough. Infusion stopped. O2 supplement/Benadryl /albuterol administered. Monitored for about 2hrs and discharged in stable condition. VSS throughout the treatment.     Lidocaine      Nasal spray causes fainting (dental shots were " "okay)    Other reaction(s): Hypotension    Prochlorperazine      Other reaction(s): Dystonic    Promethazine      Other reaction(s): Dystonic    Zolmitriptan      Other reaction(s): \"Makes me fall asleep for large amounts of time\"    Cedarwood Oil     Grass Extracts [Gramineae Pollens]     Poison Oak Extract [Extract Of Poison [place]]     Pollen Extract    [3]   Past Surgical History:  Procedure Laterality Date    ADENOIDECTOMY N/A 05/18/2023    Procedure: ADENOIDECTOMY;  Surgeon: Harsh Shin M.D.;  Location: SURGERY SAME DAY [facility];  Service: Ent    LUMBAR TRANSFORAMINAL EPIDURAL STEROID INJECTION Right 05/17/2021    Procedure: RIGHT L4-5 transforaminal epidural steroid injection with sedation;  Surgeon: Godfrey Vigil M.D.;  Location: [facility];  Service: Pain Management    DENTAL EXTRACTION(S)       "

## (undated) DEVICE — ANTI-FOG SOLUTION - 60BTL/CA

## (undated) DEVICE — LACTATED RINGERS INJ 1000 ML - (14EA/CA 60CA/PF)

## (undated) DEVICE — GOWN WARMING STANDARD FLEX - (30/CA)

## (undated) DEVICE — BOVIE FOOT CONTROL SUCTION - 6IN 10FR (25EA/CA)

## (undated) DEVICE — BLADE ELECTRODE COATED EDGE (50EA/PK)

## (undated) DEVICE — LACTATED RINGERS INJ. 500 ML - (24EA/CA)

## (undated) DEVICE — PACK ENT OR - (2EA/CA)

## (undated) DEVICE — CANISTER SUCTION RIGID RED 1500CC (40EA/CA)

## (undated) DEVICE — SPONGE TONSIL LARGE XRAY STERILE - (5/PK 20PK/CA)

## (undated) DEVICE — SENSOR OXIMETER ADULT SPO2 RD SET (20EA/BX)

## (undated) DEVICE — CANNULA O2 COMFORT SOFT EAR ADULT 7 FT TUBING (50/CA)

## (undated) DEVICE — CIRCUIT VENTILATOR PEDIATRIC WITH FILTER  (20EA/CS)

## (undated) DEVICE — SODIUM CHL IRRIGATION 0.9% 1000ML (12EA/CA)

## (undated) DEVICE — WATER IRRIGATION STERILE 1000ML (12EA/CA)

## (undated) DEVICE — GLOVE BIOGEL SZ 7.5 SURGICAL PF LTX - (50PR/BX 4BX/CA)

## (undated) DEVICE — GLOVE BIOGEL INDICATOR SZ 7.5 SURGICAL PF LTX - (50PR/BX 4BX/CA)

## (undated) DEVICE — Device

## (undated) DEVICE — SLEEVE VASO CALF MED - (10PR/CA)

## (undated) DEVICE — TOWEL STOP TIMEOUT SAFETY FLAG (40EA/CA)

## (undated) DEVICE — CANISTER SUCTION 3000ML MECHANICAL FILTER AUTO SHUTOFF MEDI-VAC NONSTERILE LF DISP  (40EA/CA)

## (undated) DEVICE — MASK ANESTHESIA CHILD INFLATABLE CUSHION BUBBLEGUM (50EA/CS)

## (undated) DEVICE — TUBE ET NASAL 7.0 UNCUFFED SHARIDAN PREFORMED (10/CA)

## (undated) DEVICE — SET CONTINU-FLO SOLN 3 - (48/CA)

## (undated) DEVICE — GLOVE SZ 7.5 BIOGEL PI MICRO - PF LF (50PR/BX)

## (undated) DEVICE — TUBING CLEARLINK DUO-VENT - C-FLO (48EA/CA)

## (undated) DEVICE — SET LEADWIRE 5 LEAD BEDSIDE DISPOSABLE ECG (1SET OF 5/EA)

## (undated) DEVICE — TUBE CONNECTING SUCTION - CLEAR PLASTIC STERILE 72 IN (50EA/CA)

## (undated) DEVICE — MASK OXYGEN VNYL ADLT MED CONC WITH 7 FOOT TUBING  - (50EA/CA)

## (undated) DEVICE — KIT  I.V. START (100EA/CA)

## (undated) DEVICE — SUCTION INSTRUMENT YANKAUER BULBOUS TIP W/O VENT (50EA/CA)